# Patient Record
Sex: FEMALE | Race: WHITE | NOT HISPANIC OR LATINO | Employment: FULL TIME | URBAN - METROPOLITAN AREA
[De-identification: names, ages, dates, MRNs, and addresses within clinical notes are randomized per-mention and may not be internally consistent; named-entity substitution may affect disease eponyms.]

---

## 2017-01-06 ENCOUNTER — OFFICE VISIT (OUTPATIENT)
Dept: DERMATOLOGY | Facility: CLINIC | Age: 22
End: 2017-01-06

## 2017-01-06 DIAGNOSIS — L65.9 ALOPECIA: Primary | ICD-10-CM

## 2017-01-06 RX ORDER — IBUPROFEN 200 MG
200 TABLET ORAL PRN
COMMUNITY
End: 2024-02-12

## 2017-01-06 RX ORDER — CLINDAMYCIN AND BENZOYL PEROXIDE 10; 50 MG/G; MG/G
GEL TOPICAL DAILY
COMMUNITY
Start: 2016-03-14 | End: 2017-03-14

## 2017-01-06 RX ORDER — TRETINOIN 1 MG/G
GEL TOPICAL AT BEDTIME
COMMUNITY
Start: 2016-03-14 | End: 2017-03-14

## 2017-01-06 ASSESSMENT — PAIN SCALES - GENERAL: PAINLEVEL: NO PAIN (0)

## 2017-01-06 NOTE — Clinical Note
1/6/2017       RE: Daily Vyas  [address] [facility] [address]     Dear Colleague,    Thank you for referring your patient, Daily Vyas, to the [facility] DERMATOLOGY at [facility]. Please see a copy of my visit note below.    [facility] Dermatology Note      Dermatology Problem List:  1.Suspected androgenetic alopecia, ddx otherwise includes alopecia areata. Will treat empirically with Minoxidil 5% foam twice nightly. Plan for scalp bx in ~4 weeks to confirm diagnosis.     Encounter Date: Jan 6, 2017    CC:   Chief Complaint   Patient presents with     Hair/Scalp Problem     Hair loss x4 months. Daily notes no burning, itch or pain. She does note a dry scalp.         History of Present Illness:  Ms. Daily Vyas is a 21 year old female who presents for evaluation of hair loss. Patient states that she first noticed having hair loss in August. Noticed this as losing more hair while combing, washing her hair. States that her hairs come out as individual strands. She has not been having clumping hair loss. Denies any associated symptoms such as burning, itching, or discomfort of her scalp. She does think that she may have some increased flaking/dandruff of her scalp in the last 2-3 weeks. States she has not noticed any eyelash, eyebrow, or nail changes during this time. Does not notice that her hairs any finer or thinner than usual. No recent significant illness or mental stress. Pt has changed shampoos since this began in attempt to help with the hair loss. Pt also started taking biotin to try to increase her hair growth. No family hx of hair loss or thinning. She is otherwise feeling well and in her general state of health without any other medical problems.     Past Medical History:   There is no problem list on file for this patient.    History reviewed. No pertinent past medical history.  History reviewed. No pertinent past surgical  history.    Social History:  The patient is a student. The patient denies use of tanning beds.    Family History:  There is no family history of skin cancer.    Medications:  Current Outpatient Prescriptions   Medication Sig Dispense Refill     clindamycin-benzoyl peroxide (BENZACLIN) gel Apply topically daily       desogestrel-ethinyl estradiol (JOSS, VELIVET) 0.1/0.125/0.15 -0.025 MG per tablet Take 1 tablet by mouth daily       ibuprofen (ADVIL/MOTRIN) 200 MG tablet Take 200 mg by mouth as needed       tretinoin microspheres (RETIN-A MICRO) 0.1 % topical gel Apply topically At Bedtime          Allergies   Allergen Reactions     Fexofenadine Other (See Comments)     Bloody nose         Review of Systems:  -As per HPI  -Constitutional: The patient denies fatigue, fevers, chills, unintended weight loss, and night sweats.  -HEENT: Patient denies nonhealing oral sores.  -Skin: As above in HPI. No additional skin concerns.    Physical exam:  Vitals: There were no vitals taken for this visit.  GEN: This is a well developed, well-nourished female in no acute distress, in a pleasant mood.    SKIN: Focused examination of the scalp was performed.  - Frontal hair line preserved  - Diffuse thinning of the scalp  - There is a more discrete patch of non-scarring hair loss on R temporal scalp with no perifollicular or scalp erythema, tenderness  - Diffuse patchy, flaking scale in scalp  - No other lesions of concern on areas examined.     Impression/Plan:  1. Suspected androgenetic alopecia: favor androgenetic alopecia given clinical presentation of diffuse thinning of hair, preservation of frontal hair line, lack of clumping hair loss, lack of many totally alopecic areas. It is also possible this is diffuse alopecia areata. Discussed pathophysiology of different diseases with the patient. Discussed treatment options, including minoxidil and spironolactone. Offered patient the option of a scalp biopsy today to help elucidate  the exact etiology of the hair loss. Patient prefers to abstain from scalp biopsy today, but would be interested in this at a follow up appointment. Will plan to treat empiricially with Rogaine today and have patient back in ~4 weeks for biopsy.     Rogaine 5% foam recommended, to apply twice nightly    Consider spironolactone in future if androgenetic alopecia confirmed    Plan to follow up in ~4 weeks for scalp biopsy      Follow-up in 4 weeks, earlier for new or changing lesions.     Dr. Fly Whaley staffed the patient.    Staff Involved:  Resident(Ryan Araya)/Staff(as above)    Staff Physician Comments:   I saw and evaluated the patient with the resident and I agree with the assessment and plan.  I was present for the examination.    Fly Whaley MD  Dermatology Staff Physician  , Department of Dermatology        Again, thank you for allowing me to participate in the care of your patient.      Sincerely,    Fly Whaley MD

## 2017-01-06 NOTE — Clinical Note
Date:January 30, 2017      Patient was self referred, no letter generated. Do not send.        [facility] Health Information

## 2017-01-06 NOTE — PATIENT INSTRUCTIONS
We suspect that your hair loss is related to female-pattern hair loss. This is due to hormones your body produces. The other possible diagnosis includes something called alopecia areata. We would need a scalp biopsy to definitively distinguish between these two diseases, but based on your clinical presentation, we do suspect female-pattern hair loss as most likely cause.  - Rogaine 5% foam, apply twice at night  - Recommend following up in roughly 4 weeks for biopsy

## 2017-01-06 NOTE — PROGRESS NOTES
[facility] Dermatology Note      Dermatology Problem List:  1.Suspected androgenetic alopecia, ddx otherwise includes alopecia areata. Will treat empirically with Minoxidil 5% foam twice nightly. Plan for scalp bx in ~4 weeks to confirm diagnosis.     Encounter Date: Jan 6, 2017    CC:   Chief Complaint   Patient presents with     Hair/Scalp Problem     Hair loss x4 months. Daily notes no burning, itch or pain. She does note a dry scalp.         History of Present Illness:  Ms. Daily Vyas is a 21 year old female who presents for evaluation of hair loss. Patient states that she first noticed having hair loss in August. Noticed this as losing more hair while combing, washing her hair. States that her hairs come out as individual strands. She has not been having clumping hair loss. Denies any associated symptoms such as burning, itching, or discomfort of her scalp. She does think that she may have some increased flaking/dandruff of her scalp in the last 2-3 weeks. States she has not noticed any eyelash, eyebrow, or nail changes during this time. Does not notice that her hairs any finer or thinner than usual. No recent significant illness or mental stress. Pt has changed shampoos since this began in attempt to help with the hair loss. Pt also started taking biotin to try to increase her hair growth. No family hx of hair loss or thinning. She is otherwise feeling well and in her general state of health without any other medical problems.     Past Medical History:   There is no problem list on file for this patient.    History reviewed. No pertinent past medical history.  History reviewed. No pertinent past surgical history.    Social History:  The patient is a student. The patient denies use of tanning beds.    Family History:  There is no family history of skin cancer.    Medications:  Current Outpatient Prescriptions   Medication Sig Dispense Refill     clindamycin-benzoyl peroxide  (BENZACLIN) gel Apply topically daily       desogestrel-ethinyl estradiol (JOSS, VELIVET) 0.1/0.125/0.15 -0.025 MG per tablet Take 1 tablet by mouth daily       ibuprofen (ADVIL/MOTRIN) 200 MG tablet Take 200 mg by mouth as needed       tretinoin microspheres (RETIN-A MICRO) 0.1 % topical gel Apply topically At Bedtime          Allergies   Allergen Reactions     Fexofenadine Other (See Comments)     Bloody nose         Review of Systems:  -As per HPI  -Constitutional: The patient denies fatigue, fevers, chills, unintended weight loss, and night sweats.  -HEENT: Patient denies nonhealing oral sores.  -Skin: As above in HPI. No additional skin concerns.    Physical exam:  Vitals: There were no vitals taken for this visit.  GEN: This is a well developed, well-nourished female in no acute distress, in a pleasant mood.    SKIN: Focused examination of the scalp was performed.  - Frontal hair line preserved  - Diffuse thinning of the scalp  - There is a more discrete patch of non-scarring hair loss on R temporal scalp with no perifollicular or scalp erythema, tenderness  - Diffuse patchy, flaking scale in scalp  - No other lesions of concern on areas examined.     Impression/Plan:  1. Suspected androgenetic alopecia: favor androgenetic alopecia given clinical presentation of diffuse thinning of hair, preservation of frontal hair line, lack of clumping hair loss, lack of many totally alopecic areas. It is also possible this is diffuse alopecia areata. Discussed pathophysiology of different diseases with the patient. Discussed treatment options, including minoxidil and spironolactone. Offered patient the option of a scalp biopsy today to help elucidate the exact etiology of the hair loss. Patient prefers to abstain from scalp biopsy today, but would be interested in this at a follow up appointment. Will plan to treat empiricially with Rogaine today and have patient back in ~4 weeks for biopsy.     Rogaine 5% foam  recommended, to apply twice nightly    Consider spironolactone in future if androgenetic alopecia confirmed    Plan to follow up in ~4 weeks for scalp biopsy      Follow-up in 4 weeks, earlier for new or changing lesions.     Dr. Fly Whaley staffed the patient.    Staff Involved:  Resident(Ryan Araya)/Staff(as above)    Staff Physician Comments:   I saw and evaluated the patient with the resident and I agree with the assessment and plan.  I was present for the examination.    Fly Whaley MD  Dermatology Staff Physician  , Department of Dermatology

## 2017-01-06 NOTE — NURSING NOTE
Dermatology Rooming Note    Daily Vyas's goals for this visit include:   Chief Complaint   Patient presents with     Hair/Scalp Problem     Hair loss x4 months. Daily notes no burning, itch or pain. She does note a dry scalp.     Annette Couch, CMA

## 2017-01-17 ENCOUNTER — OFFICE VISIT (OUTPATIENT)
Dept: DERMATOLOGY | Facility: CLINIC | Age: 22
End: 2017-01-17

## 2017-01-17 DIAGNOSIS — L65.9 ALOPECIA: ICD-10-CM

## 2017-01-17 DIAGNOSIS — L63.9 AA (ALOPECIA AREATA): Primary | ICD-10-CM

## 2017-01-17 RX ORDER — LIDOCAINE HYDROCHLORIDE AND EPINEPHRINE 10; 10 MG/ML; UG/ML
3 INJECTION, SOLUTION INFILTRATION; PERINEURAL ONCE
Qty: 3 ML | Refills: 0 | OUTPATIENT
Start: 2017-01-17 | End: 2017-01-17

## 2017-01-17 ASSESSMENT — PAIN SCALES - GENERAL: PAINLEVEL: NO PAIN (0)

## 2017-01-17 NOTE — PROGRESS NOTES
[facility] Dermatology Note      Dermatology Problem List:  1.Suspected androgenetic alopecia, ddx otherwise includes alopecia areata.    - scalp bx 1/17/17    Encounter Date: Jan 17, 2017    CC:   Chief Complaint   Patient presents with     Derm Problem     Daily comes to clinic stating she is having a scalp biopsy         History of Present Illness:  Ms. Daily Vyas is a 21 year old female who presents as a follow-up for hair loss. The patient was last seen 1/6/17. Pt is presenting today for a scalp biopsy. Pt states she has not had any significant changes since last visit about 10 days ago. Pt has not started rogaine yet. Pt otherwise doing well without any other questions or concerns.      Past Medical History:   There is no problem list on file for this patient.    No past medical history on file.  No past surgical history on file.    Social History:  The patient is a student. The patient denies use of tanning beds.    Family History:  There is no family history of skin cancer.    Medications:  Current Outpatient Prescriptions   Medication Sig Dispense Refill     clindamycin-benzoyl peroxide (BENZACLIN) gel Apply topically daily       desogestrel-ethinyl estradiol (JOSS, VELIVET) 0.1/0.125/0.15 -0.025 MG per tablet Take 1 tablet by mouth daily       ibuprofen (ADVIL/MOTRIN) 200 MG tablet Take 200 mg by mouth as needed       tretinoin microspheres (RETIN-A MICRO) 0.1 % topical gel Apply topically At Bedtime          Allergies   Allergen Reactions     Fexofenadine Other (See Comments)     Bloody nose       Review of Systems:  -As per HPI  -Constitutional: The patient denies fatigue, fevers, chills, unintended weight loss, and night sweats.  -HEENT: Patient denies nonhealing oral sores.  -Skin: As above in HPI. No additional skin concerns.    Physical exam:  Vitals: There were no vitals taken for this visit.  GEN: This is a well developed, well-nourished female in no acute distress,  in a pleasant mood.    SKIN: Focused examination of the scalp was performed.  - Frontal hair line preserved  - Diffuse thinning of the scalp  - There is a more discrete patch of non-scarring hair loss on R temporal scalp with no perifollicular or scalp erythema, tenderness  - Diffuse flaking scale in scalp  -No other lesions of concern on areas examined.     Impression/Plan:  1. Suspected Androgenetic alopecia: favor androgenetic alopecia given clinical presentation of diffuse thinning of hair, preservation of frontal hair line, lack of clumping hair loss, lack of many totally alopecic areas. It is also possible this is diffuse alopecia areata. Differential otherwise can include hair loss secondary to SLE. Scalp biopsy obtained today to help further elucidate exact underlying cause of hair loss.      Scalp bx obtained 1/17/17    Will plan to likely begin Minoxidil 5% pending biopsy results     Also will consider spironolactone in future if androgenetic alopecia confirmed    Follow-up in 2 weeks, earlier for new or changing lesions.     Dr. Whaley staffed the patient.    Staff Involved:  Resident(Ryan Araya)/Staff(as above)    Staff Physician Comments:   I saw and evaluated the patient with the resident and I agree with the assessment and plan.  I was present for the key portions of the above major procedure and examination.    Fly Whaley MD  Dermatology Staff Physician  , Department of Dermatology

## 2017-01-17 NOTE — NURSING NOTE
Dermatology Rooming Note    Daily Vyas's goals for this visit include:   Chief Complaint   Patient presents with     Derm Problem     Daily comes to clinic stating she is having a scalp biopsy     Anu Rivera LPN

## 2017-01-17 NOTE — PATIENT INSTRUCTIONS
Wound Care After a Biopsy    What is a skin biopsy?  A skin biopsy allows the doctor to examine a very small piece of tissue under the microscope to determine the diagnosis and the best treatment for the skin condition. A local anesthetic (numbing medicine)  is injected with a very small needle into the skin area to be tested. A small piece of skin is taken from the area. Sometimes a suture (stitch) is used.     What are the risks of a skin biopsy?  I will experience scar, bleeding, swelling, pain, crusting and redness. I may experience incomplete removal or recurrence. Risks of this procedure are excessive bleeding, bruising, infection, nerve damage, numbness, thick (hypertrophic or keloidal) scar and non-diagnostic biopsy.    How should I care for my wound for the first 24 hours?    Keep the wound dry and covered for 24 hours    If it bleeds, hold direct pressure on the area for 15 minutes. If bleeding does not stop then go to the emergency room    Avoid strenuous exercise the first 1-2 days or as your doctor instructs you    How should I care for the wound after 24 hours?    After 24 hours, remove the bandage    You may bathe or shower as normal    If you had a scalp biopsy, you can shampoo as usual and can use shower water to clean the biopsy site daily    Clean the wound twice a day with gentle soap and water    Do not scrub, be gentle    Apply white petroleum/Vaseline after cleaning the wound with a cotton swab or a clean finger, and keep the site covered with a Bandaid /bandage. Bandages are not necessary with a scalp biopsy    If you are unable to cover the site with a Bandaid /bandage, re-apply ointment 2-3 times a day to keep the site moist. Moisture will help with healing    Avoid strenuous activity for first 1-2 days    Avoid lakes, rivers, pools, and oceans until the stitches are removed or the site is healed    How do I clean my wound?    Wash hands for 15 minutes with soap or use hand  before  all wound care    Clean the wound with gentle soap and water    Apply white petroleum/Vaseline  to wound after it is clean    Replace the Bandaid /bandage to keep the wound covered for the first few days or as instructed by your doctor    If you had a scalp biopsy, warm shower water to the area on a daily basis should suffice    What should I use to clean my wound?     Cotton-tipped applicators (Qtips )    White petroleum jelly (Vaseline ). Use a clean new container and use Q-tips to apply.    Bandaids   as needed    Gentle soap     How should I care for my wound long term?    Do not get your wound dirty    Keep up with wound care for one week or until the area is healed.    A small scab will form and fall off by itself when the area is completely healed. The area will be red and will become pink in color as it heals. Sun protection is very important for how your scar will turn out. Sunscreen with an SPF 30 or greater is recommended once the area is healed.    If you have stitches, stitches need to be removed in 14 days. You may return to our clinic for this or you may have it done locally at your doctor s office.    You should have some soreness but it should be mild and slowly go away over several days. Talk to your doctor about using tylenol for pain,    When should I call my doctor?  If you have increased:     Pain or swelling    Pus or drainage (clear or slightly yellow drainage is ok)    Temperature over 100F    Spreading redness or warmth around wound    When will I hear about my results?  The biopsy results can take 2-3 weeks to come back. The clinic will call you with the results, send you a [employer] message, or have you schedule a follow-up clinic or phone time to discuss the results. Contact our clinics if you do not hear from us in 3 weeks.     Who should I call with questions?    [facility]: [phone]     [facility]: [phone]    For  urgent needs outside of business hours call the [facility] at [phone] and ask for the dermatology resident on call

## 2017-01-17 NOTE — Clinical Note
Date:February 1, 2017      Patient was self referred, no letter generated. Do not send.        [facility] Physicians Health Information

## 2017-01-17 NOTE — MR AVS SNAPSHOT
After Visit Summary   1/17/2017    Daily Vyas    MRN: [id number]           Patient Information     Date Of Birth          1995        Visit Information        Provider Department      1/17/2017 1:00 PM Fly Whaley MD [facility] Dermatology        Today's Diagnoses     AA (alopecia areata)    -  1     Alopecia           Care Instructions    Wound Care After a Biopsy    What is a skin biopsy?  A skin biopsy allows the doctor to examine a very small piece of tissue under the microscope to determine the diagnosis and the best treatment for the skin condition. A local anesthetic (numbing medicine)  is injected with a very small needle into the skin area to be tested. A small piece of skin is taken from the area. Sometimes a suture (stitch) is used.     What are the risks of a skin biopsy?  I will experience scar, bleeding, swelling, pain, crusting and redness. I may experience incomplete removal or recurrence. Risks of this procedure are excessive bleeding, bruising, infection, nerve damage, numbness, thick (hypertrophic or keloidal) scar and non-diagnostic biopsy.    How should I care for my wound for the first 24 hours?    Keep the wound dry and covered for 24 hours    If it bleeds, hold direct pressure on the area for 15 minutes. If bleeding does not stop then go to the emergency room    Avoid strenuous exercise the first 1-2 days or as your doctor instructs you    How should I care for the wound after 24 hours?    After 24 hours, remove the bandage    You may bathe or shower as normal    If you had a scalp biopsy, you can shampoo as usual and can use shower water to clean the biopsy site daily    Clean the wound twice a day with gentle soap and water    Do not scrub, be gentle    Apply white petroleum/Vaseline after cleaning the wound with a cotton swab or a clean finger, and keep the site covered with a Bandaid /bandage. Bandages are not necessary with a scalp biopsy    If you are  unable to cover the site with a Bandaid /bandage, re-apply ointment 2-3 times a day to keep the site moist. Moisture will help with healing    Avoid strenuous activity for first 1-2 days    Avoid lakes, rivers, pools, and oceans until the stitches are removed or the site is healed    How do I clean my wound?    Wash hands for 15 minutes with soap or use hand  before all wound care    Clean the wound with gentle soap and water    Apply white petroleum/Vaseline  to wound after it is clean    Replace the Bandaid /bandage to keep the wound covered for the first few days or as instructed by your doctor    If you had a scalp biopsy, warm shower water to the area on a daily basis should suffice    What should I use to clean my wound?     Cotton-tipped applicators (Qtips )    White petroleum jelly (Vaseline ). Use a clean new container and use Q-tips to apply.    Bandaids   as needed    Gentle soap     How should I care for my wound long term?    Do not get your wound dirty    Keep up with wound care for one week or until the area is healed.    A small scab will form and fall off by itself when the area is completely healed. The area will be red and will become pink in color as it heals. Sun protection is very important for how your scar will turn out. Sunscreen with an SPF 30 or greater is recommended once the area is healed.    If you have stitches, stitches need to be removed in 14 days. You may return to our clinic for this or you may have it done locally at your doctor s office.    You should have some soreness but it should be mild and slowly go away over several days. Talk to your doctor about using tylenol for pain,    When should I call my doctor?  If you have increased:     Pain or swelling    Pus or drainage (clear or slightly yellow drainage is ok)    Temperature over 100F    Spreading redness or warmth around wound    When will I hear about my results?  The biopsy results can take 2-3 weeks to come  back. The clinic will call you with the results, send you a [employer] message, or have you schedule a follow-up clinic or phone time to discuss the results. Contact our clinics if you do not hear from us in 3 weeks.     Who should I call with questions?    [facility]: [phone]     [facility]: [phone]    For urgent needs outside of business hours call the [facility] at [phone] and ask for the dermatology resident on call            Follow-ups after your visit        Follow-up notes from your care team     Return in about 2 weeks (around 1/31/2017).      Your next 10 appointments already scheduled     Jan 31, 2017  1:30 PM   (Arrive by 1:15 PM)   Return Visit with Fly Whaley MD   [facility] Dermatology ([facility] [place])    [address] [place] [id number] [phone]              Who to contact     Please call your clinic at [phone] to:    Ask questions about your health    Make or cancel appointments    Discuss your medicines    Learn about your test results    Speak to your doctor   If you have compliments or concerns about an experience at your clinic, or if you wish to file a complaint, please contact [facility] Patient Relations at [phone] or email us at [email or web address]         Additional Information About Your Visit        [employer] Information     [employer] gives you secure access to your electronic health record. If you see a primary care provider, you can also send messages to your care team and make appointments. If you have questions, please call your primary care clinic.  If you do not have a primary care provider, please call [phone] and they will assist you.      [employer] is an electronic gateway that provides easy, online access to your medical records. With [employer], you can request a clinic appointment, read your  test results, renew a prescription or communicate with your care team.     To access your existing account, please contact your [facility] or call [phone] for assistance.        Care EveryWhere ID     This is your Care EveryWhere ID. This could be used by other organizations to access your [facility] medical records  [id number]         Blood Pressure from Last 3 Encounters:   No data found for BP    Weight from Last 3 Encounters:   No data found for Wt              We Performed the Following     BIOPSY SKIN/SUBQ/MUC MEM, SINGLE LESION     Surgical pathology exam          Today's Medication Changes          These changes are accurate as of: 1/17/17  1:56 PM.  If you have any questions, ask your nurse or doctor.               Start taking these medicines.        Dose/Directions    lidocaine 1% with EPINEPHrine 1:100,000 1 %-1:371665 injection   Used for:  Alopecia        Dose:  3 mL   Inject 3 mLs into the skin once for 1 dose   Quantity:  3 mL   Refills:  0            Where to get your medicines      Some of these will need a paper prescription and others can be bought over the counter.  Ask your nurse if you have questions.     You don't need a prescription for these medications    - lidocaine 1% with EPINEPHrine 1:100,000 1 %-1:972942 injection             Primary Care Provider    [facility] Other Clinic                Thank you!     Thank you for choosing [facility] DERMATOLOGY  for your care. Our goal is always to provide you with excellent care. Hearing back from our patients is one way we can continue to improve our services. Please take a few minutes to complete the written survey that you may receive in the mail after your visit with us. Thank you!             Your Updated Medication List - Protect others around you: Learn how to safely use, store and throw away your medicines at www.disposemymeds.org.          This list is accurate as of: 1/17/17  1:56 PM.  Always use your most  recent med list.                   Brand Name Dispense Instructions for use    clindamycin-benzoyl peroxide gel    BENZACLIN     Apply topically daily       desogestrel-ethinyl estradiol 0.1/0.125/0.15 -0.025 MG per tablet    JOSS, VELIVET     Take 1 tablet by mouth daily       ibuprofen 200 MG tablet    ADVIL/MOTRIN     Take 200 mg by mouth as needed       lidocaine 1% with EPINEPHrine 1:100,000 1 %-1:263178 injection     3 mL    Inject 3 mLs into the skin once for 1 dose       tretinoin microspheres 0.1 % topical gel    RETIN-A MICRO     Apply topically At Bedtime

## 2017-01-17 NOTE — Clinical Note
1/17/2017       RE: Daily Vyas  [address] [facility] [address]     Dear Colleague,    Thank you for referring your patient, Daily Vyas, to the [facility] DERMATOLOGY at [facility]. Please see a copy of my visit note below.    [facility] Dermatology Note      Dermatology Problem List:  1.Suspected androgenetic alopecia, ddx otherwise includes alopecia areata.    - scalp bx 1/17/17    Encounter Date: Jan 17, 2017    CC:   Chief Complaint   Patient presents with     Derm Problem     Daily comes to clinic stating she is having a scalp biopsy         History of Present Illness:  Ms. Daily Vyas is a 21 year old female who presents as a follow-up for hair loss. The patient was last seen 1/6/17. Pt is presenting today for a scalp biopsy. Pt states she has not had any significant changes since last visit about 10 days ago. Pt has not started rogaine yet. Pt otherwise doing well without any other questions or concerns.      Past Medical History:   There is no problem list on file for this patient.    No past medical history on file.  No past surgical history on file.    Social History:  The patient is a student. The patient denies use of tanning beds.    Family History:  There is no family history of skin cancer.    Medications:  Current Outpatient Prescriptions   Medication Sig Dispense Refill     clindamycin-benzoyl peroxide (BENZACLIN) gel Apply topically daily       desogestrel-ethinyl estradiol (JOSS, VELIVET) 0.1/0.125/0.15 -0.025 MG per tablet Take 1 tablet by mouth daily       ibuprofen (ADVIL/MOTRIN) 200 MG tablet Take 200 mg by mouth as needed       tretinoin microspheres (RETIN-A MICRO) 0.1 % topical gel Apply topically At Bedtime          Allergies   Allergen Reactions     Fexofenadine Other (See Comments)     Bloody nose       Review of Systems:  -As per HPI  -Constitutional: The patient denies fatigue, fevers, chills,  unintended weight loss, and night sweats.  -HEENT: Patient denies nonhealing oral sores.  -Skin: As above in HPI. No additional skin concerns.    Physical exam:  Vitals: There were no vitals taken for this visit.  GEN: This is a well developed, well-nourished female in no acute distress, in a pleasant mood.    SKIN: Focused examination of the scalp was performed.  - Frontal hair line preserved  - Diffuse thinning of the scalp  - There is a more discrete patch of non-scarring hair loss on R temporal scalp with no perifollicular or scalp erythema, tenderness  - Diffuse flaking scale in scalp  -No other lesions of concern on areas examined.     Impression/Plan:  1. Suspected Androgenetic alopecia: favor androgenetic alopecia given clinical presentation of diffuse thinning of hair, preservation of frontal hair line, lack of clumping hair loss, lack of many totally alopecic areas. It is also possible this is diffuse alopecia areata. Differential otherwise can include hair loss secondary to SLE. Scalp biopsy obtained today to help further elucidate exact underlying cause of hair loss.      Scalp bx obtained 1/17/17    Will plan to likely begin Minoxidil 5% pending biopsy results     Also will consider spironolactone in future if androgenetic alopecia confirmed    Follow-up in 2 weeks, earlier for new or changing lesions.     Dr. Whaley staffed the patient.    Staff Involved:  Resident(Ryan Araya)/Staff(as above)    Staff Physician Comments:   I saw and evaluated the patient with the resident and I agree with the assessment and plan.  I was present for the key portions of the above major procedure and examination.    Fly Whaley MD  Dermatology Staff Physician  , Department of Dermatology        Again, thank you for allowing me to participate in the care of your patient.      Sincerely,    Fly Whaley MD

## 2017-01-18 ASSESSMENT — PAIN SCALES - GENERAL: PAINLEVEL: NO PAIN (0)

## 2017-01-23 LAB — COPATH REPORT: NORMAL

## 2017-01-28 PROBLEM — L65.9 ALOPECIA: Status: ACTIVE | Noted: 2017-01-28

## 2017-01-31 ENCOUNTER — OFFICE VISIT (OUTPATIENT)
Dept: DERMATOLOGY | Facility: CLINIC | Age: 22
End: 2017-01-31

## 2017-01-31 DIAGNOSIS — L63.9 AA (ALOPECIA AREATA): Primary | ICD-10-CM

## 2017-01-31 DIAGNOSIS — L21.9 DERMATITIS, SEBORRHEIC: ICD-10-CM

## 2017-01-31 RX ORDER — CLOBETASOL PROPIONATE 0.5 MG/ML
SOLUTION TOPICAL
Qty: 50 ML | Refills: 3 | Status: SHIPPED | OUTPATIENT
Start: 2017-01-31 | End: 2024-02-12

## 2017-01-31 RX ORDER — KETOCONAZOLE 20 MG/ML
SHAMPOO TOPICAL
Qty: 120 ML | Refills: 11 | Status: SHIPPED | OUTPATIENT
Start: 2017-01-31 | End: 2018-03-06

## 2017-01-31 ASSESSMENT — PAIN SCALES - GENERAL: PAINLEVEL: NO PAIN (0)

## 2017-01-31 NOTE — NURSING NOTE
"Dermatology Rooming Note    Daily Vyas's goals for this visit include:   Chief Complaint   Patient presents with     Derm Problem     follow up, \"things are going well.\"     Bindu Hurtado, [facility]    "

## 2017-01-31 NOTE — LETTER
Date:February 13, 2017      Patient was self referred, no letter generated. Do not send.        [facility] Physicians Health Information

## 2017-01-31 NOTE — Clinical Note
"1/31/2017       RE: Daily Vyas  [address] [facility] [address]     Dear Colleague,    Thank you for referring your patient, Daily Vyas, to the [facility] DERMATOLOGY at [facility]. Please see a copy of my visit note below.    [facility] Dermatology Note      Dermatology Problem List:  1.Suspected diffuse alopecia areata.    - scalp bx 1/17/17    Encounter Date: Jan 31, 2017    CC:   Chief Complaint   Patient presents with     Derm Problem     follow up, \"things are going well.\"         History of Present Illness:  Ms. Daily Vyas is a 21 year old female who presents as a follow-up for hair loss. The patient was last seen 1/17/17 for a scalp biopsy and suture removal. Results have returned but she has not heard about them yet.    She notes she has not been paying particular attention to her hair loss in the last two weeks. She has not changed any behaviors in hair care except washing less frequently.    As far as her skin history, her mother notes she had childhood eczema and asthma. Daily reports once episode of a rash with red scaly spots last year after an URI. She treated the rash with steroids and it resolved.      Past Medical History:   Patient Active Problem List   Diagnosis     Alopecia   Notes childhood history of eczema and asthma  Note ear flaking for the past ~2 years  Nickel allergy- identified by allergy to belt buckle  \"Sun allergy\"- mom notes hives when out in sun, pt reports this has improve over time    History reviewed. No pertinent past medical history.  History reviewed. No pertinent past surgical history.    Social History:  The patient is a student. The patient denies use of tanning beds.    Family History:  Eczema in maternal grandfather, mother  Father with eczema and cracking    Medications:  Current Outpatient Prescriptions   Medication Sig Dispense Refill     clindamycin-benzoyl peroxide (BENZACLIN) gel " Apply topically daily       desogestrel-ethinyl estradiol (JOSS, VELIVET) 0.1/0.125/0.15 -0.025 MG per tablet Take 1 tablet by mouth daily       ibuprofen (ADVIL/MOTRIN) 200 MG tablet Take 200 mg by mouth as needed       tretinoin microspheres (RETIN-A MICRO) 0.1 % topical gel Apply topically At Bedtime          Allergies   Allergen Reactions     Fexofenadine Other (See Comments)     Bloody nose       Review of Systems:  -As per HPI  -Constitutional: The patient denies fatigue, fevers, chills, unintended weight loss, and night sweats.  -HEENT: Patient denies nonhealing oral sores.  -Skin: As above in HPI. No additional skin concerns.    Physical exam:  Vitals: There were no vitals taken for this visit.  GEN: This is a well developed, well-nourished female in no acute distress, in a pleasant mood.    SKIN: Focused examination of the scalp was performed.  - Frontal hair line preserved  - Diffuse decreased density of hair of scalp  - There is a more discrete patch of non-scarring hair loss on R temporal scalp with no perifollicular or scalp erythema, tenderness  - Diffuse flaking scale in scalp  - All ten fingernails WNL  - slight erythema of gluteal cleft with minimal scale at superior aspect  - No other lesions of concern on areas examined.     Labs:  A.  Skin, scalp, right parietal, horizontal:   - Nonscarring alopecia with miniaturization and a pronounced nonanagen   shift - (see comment)     B.  Skin, scalp, right parietal, vertical:   - Nonscarring alopecia - (see comment)     COMMENT:   Both biopsies demonstrate similar features.  Given the near-absence of   sebaceous glands in part A, and notably miniaturized sebaceous units in   part B, psoriatic alopecia is suspected, though the differential   diagnosis also includes alopecia areata.  Clinical correlation is   recommended.     Impression/Plan:  1. Alopecia: ddx psoriatic alopecia v. diffuse alopecia areata based on biopsy results. Discussed both of these  conditions in depth, including explanation of autoimmune nature of alopecia areata and attack of the pigmented hair. Discussed that natural history is difficult to predict based on the infrequency with which we see this pattern. Because psoriatic destructive alopecia is only seen in fulminant cases of scalp psoriasis, and this does not match her clinical scenario - I think that the diagnosis is best interpreted as diffuse alopecia areata.    Start ketoconazole 2% shampoo 3 times weekly (discussed Head and Shoulders and Selsun Blue to rotate in to make for shampooing once daily, can condition for the ends of the hair)    Discussed treatment options including intralesional triamcinolone injections v. Topical steroids    After discussion with patient and mother, elected to prescribe topical clobetasol 0.05% solution, and apply to frontal, crown and sides of scalp (ears and above)    As far as Rogaine (minoxidil): this does not treat the inflammation at the base of the hair follicles, but it may help hair grow back faster IF it is able to grow (basically if the inflammation is under control).     TSH: pt thinks may have been checked recently, records to be obtained - release of info signed    Follow-up in 4 weeks, earlier for new or changing lesions.     Dr. Whaley staffed the patient.    Staff Involved:  Resident(Charu Ibrahim)/Staff(as above)    Charu Ibrahim MD  PGY-3 Dermatology  Pager: [phone]    Staff Physician Comments:   I saw and evaluated the patient with the resident and I agree with the assessment and plan.  I was present for the examination.    Fly Whaley MD  Dermatology Staff Physician  , Department of Dermatology        Again, thank you for allowing me to participate in the care of your patient.      Sincerely,    Fly Whaley MD

## 2017-01-31 NOTE — MR AVS SNAPSHOT
After Visit Summary   1/31/2017    Daily Vyas    MRN: [id number]           Patient Information     Date Of Birth          1995        Visit Information        Provider Department      1/31/2017 1:15 PM Fly Whaley MD [facility] Dermatology        Today's Diagnoses     AA (alopecia areata)    -  1     Dermatitis, seborrheic           Care Instructions    Today's discussion:    Diagnosis: diffuse alopecia areata (working diagnosis! Remember that the biopsy was not 100% clear and we will follow you closely)      Start ketoconazole 2% shampoo 3 times weekly (discussed Head and Shoulders and Selsun Blue to rotate in to make for shampooing once daily, can condition for the ends of the hair)       Discussed treatment options including intralesional triamcinolone injections v. Topical steroids      Topical clobetasol 0.05% solution, and apply to frontal, crown and sides of scalp (ears and above), ONCE daily at time convenient to you      As far as Rogaine (minoxidil): this does not treat the inflammation at the base of the hair follicles, but it may help hair grow back faster IF it is able to grow (basically if the inflammation is under control).           Follow-ups after your visit        Follow-up notes from your care team     Return in about 4 weeks (around 2/28/2017).      Who to contact     Please call your clinic at [phone] to:    Ask questions about your health    Make or cancel appointments    Discuss your medicines    Learn about your test results    Speak to your doctor   If you have compliments or concerns about an experience at your clinic, or if you wish to file a complaint, please contact [facility] Physicians Patient Relations at [phone] or email us at [email or web address]         Additional Information About Your Visit        MyChart Information     [employer] gives you secure access to your electronic health record. If you see a primary care  provider, you can also send messages to your care team and make appointments. If you have questions, please call your primary care clinic.  If you do not have a primary care provider, please call [phone] and they will assist you.      [employer] is an electronic gateway that provides easy, online access to your medical records. With [employer], you can request a clinic appointment, read your test results, renew a prescription or communicate with your care team.     To access your existing account, please contact your [facility] or call [phone] for assistance.        Care EveryWhere ID     This is your Care EveryWhere ID. This could be used by other organizations to access your [facility] medical records  [id number]         Blood Pressure from Last 3 Encounters:   No data found for BP    Weight from Last 3 Encounters:   No data found for Wt              Today, you had the following     No orders found for display       Primary Care Provider    [facility] Other Clinic                Thank you!     Thank you for choosing [facility] DERMATOLOGY  for your care. Our goal is always to provide you with excellent care. Hearing back from our patients is one way we can continue to improve our services. Please take a few minutes to complete the written survey that you may receive in the mail after your visit with us. Thank you!             Your Updated Medication List - Protect others around you: Learn how to safely use, store and throw away your medicines at www.disposemymeds.org.          This list is accurate as of: 1/31/17  2:17 PM.  Always use your most recent med list.                   Brand Name Dispense Instructions for use    clindamycin-benzoyl peroxide gel    BENZACLIN     Apply topically daily       desogestrel-ethinyl estradiol 0.1/0.125/0.15 -0.025 MG per tablet    JOSS, VELIVET     Take 1 tablet by mouth daily       ibuprofen 200 MG tablet    ADVIL/MOTRIN     Take 200 mg by mouth as  needed       tretinoin microspheres 0.1 % topical gel    RETIN-A MICRO     Apply topically At Bedtime

## 2017-01-31 NOTE — PATIENT INSTRUCTIONS
Today's discussion:    Diagnosis: diffuse alopecia areata (working diagnosis! Remember that the biopsy was not 100% clear and we will follow you closely)      Start ketoconazole 2% shampoo 3 times weekly (discussed Head and Shoulders and Selsun Blue to rotate in to make for shampooing once daily, can condition for the ends of the hair)       Discussed treatment options including intralesional triamcinolone injections v. Topical steroids      Topical clobetasol 0.05% solution, and apply to frontal, crown and sides of scalp (ears and above), ONCE daily at time convenient to you      As far as Rogaine (minoxidil): this does not treat the inflammation at the base of the hair follicles, but it may help hair grow back faster IF it is able to grow (basically if the inflammation is under control).

## 2017-01-31 NOTE — PROGRESS NOTES
"[facility] Dermatology Note      Dermatology Problem List:  1.Suspected diffuse alopecia areata.    - scalp bx 1/17/17    Encounter Date: Jan 31, 2017    CC:   Chief Complaint   Patient presents with     Derm Problem     follow up, \"things are going well.\"         History of Present Illness:  Ms. Daily Vyas is a 21 year old female who presents as a follow-up for hair loss. The patient was last seen 1/17/17 for a scalp biopsy and suture removal. Results have returned but she has not heard about them yet.    She notes she has not been paying particular attention to her hair loss in the last two weeks. She has not changed any behaviors in hair care except washing less frequently.    As far as her skin history, her mother notes she had childhood eczema and asthma. Daily reports once episode of a rash with red scaly spots last year after an URI. She treated the rash with steroids and it resolved.      Past Medical History:   Patient Active Problem List   Diagnosis     Alopecia   Notes childhood history of eczema and asthma  Note ear flaking for the past ~2 years  Nickel allergy- identified by allergy to belt buckle  \"Sun allergy\"- mom notes hives when out in sun, pt reports this has improve over time    History reviewed. No pertinent past medical history.  History reviewed. No pertinent past surgical history.    Social History:  The patient is a student. The patient denies use of tanning beds.    Family History:  Eczema in maternal grandfather, mother  Father with eczema and cracking    Medications:  Current Outpatient Prescriptions   Medication Sig Dispense Refill     clindamycin-benzoyl peroxide (BENZACLIN) gel Apply topically daily       desogestrel-ethinyl estradiol (JOSS, VELIVET) 0.1/0.125/0.15 -0.025 MG per tablet Take 1 tablet by mouth daily       ibuprofen (ADVIL/MOTRIN) 200 MG tablet Take 200 mg by mouth as needed       tretinoin microspheres (RETIN-A MICRO) 0.1 % topical gel " Apply topically At Bedtime          Allergies   Allergen Reactions     Fexofenadine Other (See Comments)     Bloody nose       Review of Systems:  -As per HPI  -Constitutional: The patient denies fatigue, fevers, chills, unintended weight loss, and night sweats.  -HEENT: Patient denies nonhealing oral sores.  -Skin: As above in HPI. No additional skin concerns.    Physical exam:  Vitals: There were no vitals taken for this visit.  GEN: This is a well developed, well-nourished female in no acute distress, in a pleasant mood.    SKIN: Focused examination of the scalp was performed.  - Frontal hair line preserved  - Diffuse decreased density of hair of scalp  - There is a more discrete patch of non-scarring hair loss on R temporal scalp with no perifollicular or scalp erythema, tenderness  - Diffuse flaking scale in scalp  - All ten fingernails WNL  - slight erythema of gluteal cleft with minimal scale at superior aspect  - No other lesions of concern on areas examined.     Labs:  A.  Skin, scalp, right parietal, horizontal:   - Nonscarring alopecia with miniaturization and a pronounced nonanagen   shift - (see comment)     B.  Skin, scalp, right parietal, vertical:   - Nonscarring alopecia - (see comment)     COMMENT:   Both biopsies demonstrate similar features.  Given the near-absence of   sebaceous glands in part A, and notably miniaturized sebaceous units in   part B, psoriatic alopecia is suspected, though the differential   diagnosis also includes alopecia areata.  Clinical correlation is   recommended.     Impression/Plan:  1. Alopecia: ddx psoriatic alopecia v. diffuse alopecia areata based on biopsy results. Discussed both of these conditions in depth, including explanation of autoimmune nature of alopecia areata and attack of the pigmented hair. Discussed that natural history is difficult to predict based on the infrequency with which we see this pattern. Because psoriatic destructive alopecia is only seen  in fulminant cases of scalp psoriasis, and this does not match her clinical scenario - I think that the diagnosis is best interpreted as diffuse alopecia areata.    Start ketoconazole 2% shampoo 3 times weekly (discussed Head and Shoulders and Selsun Blue to rotate in to make for shampooing once daily, can condition for the ends of the hair)    Discussed treatment options including intralesional triamcinolone injections v. Topical steroids    After discussion with patient and mother, elected to prescribe topical clobetasol 0.05% solution, and apply to frontal, crown and sides of scalp (ears and above)    As far as Rogaine (minoxidil): this does not treat the inflammation at the base of the hair follicles, but it may help hair grow back faster IF it is able to grow (basically if the inflammation is under control).     TSH: pt thinks may have been checked recently, records to be obtained - release of info signed    Follow-up in 4 weeks, earlier for new or changing lesions.     Dr. Whaley staffed the patient.    Staff Involved:  Resident(Charu Ibrahim)/Staff(as above)    Charu Ibrahim MD  PGY-3 Dermatology  Pager: [phone]    Staff Physician Comments:   I saw and evaluated the patient with the resident and I agree with the assessment and plan.  I was present for the examination.    Fly Whaley MD  Dermatology Staff Physician  , Department of Dermatology

## 2017-02-09 ENCOUNTER — TELEPHONE (OUTPATIENT)
Dept: DERMATOLOGY | Facility: CLINIC | Age: 22
End: 2017-02-09

## 2017-02-09 NOTE — TELEPHONE ENCOUNTER
----- Message from Abigail Cormier sent at 2/9/2017 10:27 AM CST -----  Regarding: symptomatic pt - reaction to rx  Contact: [phone]  Pt Ph # [phone],    Pt of Dr Whaley,    Pt received new prescriptions last week and Pt has broken out in a rash since then, Wondering if she should come back to Dermatology to get it looked at or go to PCP or Urgent care, and if should stop taking medicine, etc;    Pt wants call back today to discuss what she should do,    Thanks,  Abigail in Call Center    Please DO NOT send this message and/or reply back to sender.  Call Center Representatives DO NOT respond to messages.

## 2017-02-09 NOTE — TELEPHONE ENCOUNTER
Returned patient's call regarding slightly itchy rash on b/l abdomen. It is exacerbated by heat, and clothing rubbing on the area.  She was seen by Dr. Whaley 1/31/17 for alopecia, ddx psoriatic alopecia vs alopecia areata. She started ketoconazole shampoo and clobetasol solution to the scalp 1 week ago, and started having the abdominal rash 2 days ago. Her neck and upper back are clear. She denies rashes elsewhere, and feels otherwise well.   I discussed that an allergic contact dermatitis to a shampoo would mostly likely show rash on the neck and shoulders, which she is not having. I advised she take a picture of the rash to show Dr. Whaley at her follow up in a few weeks, and start applying the clobetasol solution to the rash twice daily. She should call in 1 week if not doing better. She is happy with the plan.  Florentin Hollis MD  PGY-4 Dermatology Resident  [phone]

## 2017-03-01 ENCOUNTER — OFFICE VISIT (OUTPATIENT)
Dept: DERMATOLOGY | Facility: CLINIC | Age: 22
End: 2017-03-01

## 2017-03-01 DIAGNOSIS — L63.9 AA (ALOPECIA AREATA): Primary | ICD-10-CM

## 2017-03-01 ASSESSMENT — PAIN SCALES - GENERAL: PAINLEVEL: NO PAIN (0)

## 2017-03-01 NOTE — LETTER
3/1/2017       RE: Daily Vyas  [address]  [facility] [address]     Dear Colleague,    Thank you for referring your patient, Daily Vyas, to the [facility] DERMATOLOGY at [facility]. Please see a copy of my visit note below.    [facility] Dermatology Note    Dermatology Problem List:  1. Suspected diffuse alopecia areata.    - scalp bx 1/17/17 favoring psoriatic alopecia but does not match clinically     Encounter Date: Mar 1, 2017    CC:   Chief Complaint   Patient presents with     Hair/Scalp Problem     Alopecia Areata. Daily notes no change since her last visit.     History of Present Illness:  Ms. Daily Vyas is a 21 year old female who presents as a follow-up for hair loss. She had a scalp biopsy on 1/17/17 showing nonscarring alopecia with near-absent and miniaturized sebaceous units most suggestive of psoriatic alopecia. Her insurance has not covered her last two visits or the biopsy because it was deemed cosmetic. She has been using the clobetasol 0.05% shampoo with no significant improvement. No skin irritation or pruritus. She has been having some headaches.     Past Medical History:   Patient Active Problem List   Diagnosis     Alopecia       Past Medical History   Diagnosis Date     Eczema      Nickel allergy      identified by allergy to belt buckle     Uncomplicated asthma      No past surgical history on file.    Social History:  The patient is a student. The patient denies use of tanning beds.    Family History:  Eczema in maternal grandfather, mother  Father with eczema and cracking    Medications:  Current Outpatient Prescriptions   Medication Sig Dispense Refill     ketoconazole (NIZORAL) 2 % shampoo Apply and lather in to damp scalp, leave on skin 3-5 minutes and then rinse 3 times weekly. 120 mL 11     clobetasol (TEMOVATE) 0.05 % external solution Apply to affected areas of scalp (above ears) once daily. 50 mL 3      clindamycin-benzoyl peroxide (BENZACLIN) gel Apply topically daily       desogestrel-ethinyl estradiol (JOSS, VELIVET) 0.1/0.125/0.15 -0.025 MG per tablet Take 1 tablet by mouth daily       ibuprofen (ADVIL/MOTRIN) 200 MG tablet Take 200 mg by mouth as needed       tretinoin microspheres (RETIN-A MICRO) 0.1 % topical gel Apply topically At Bedtime          Allergies   Allergen Reactions     Fexofenadine Other (See Comments)     Bloody nose       Review of Systems:  -As per HPI  -Constitutional: The patient denies fatigue, fevers, chills, unintended weight loss, and night sweats.  -HEENT: Patient denies nonhealing oral sores.  -Skin: As above in HPI. No additional skin concerns.    Physical exam:  Vitals: There were no vitals taken for this visit.  GEN: This is a well developed, well-nourished female in no acute distress, in a pleasant mood.    SKIN: Focused examination of the scalp was performed.  - Frontal hair line preserved  - Negative hair pull test in 4 quadrants  - Scalp healthy without erythema, scale  - Clinically continues to be a non scarring process without loss of follicular orifices  - No other lesions of concern on areas examined.     Labs:  A.  Skin, scalp, right parietal, horizontal: Nonscarring alopecia with miniaturization and a pronounced nonanagen shift - (see comment)   B.  Skin, scalp, right parietal, vertical: Nonscarring alopecia - (see comment)   COMMENT:   Both biopsies demonstrate similar features.  Given the near-absence of sebaceous glands in part A, and notably miniaturized sebaceous units in part B, psoriatic alopecia is suspected, though the differential diagnosis also includes alopecia areata.  Clinical correlation is recommended.     Impression/Plan:  1. Alopecia: ddx psoriatic alopecia v. diffuse alopecia areata based on biopsy results. Discussed both of these conditions in depth, including explanation of autoimmune nature of alopecia areata and attack of the pigmented hair.  "Discussed that natural history is difficult to predict based on the infrequency with which we see this pattern. Because psoriatic destructive alopecia is only seen in fulminant cases of scalp psoriasis, and this does not match her clinical scenario - I think that the diagnosis is best interpreted as diffuse alopecia areata. A meta-analysis from the [place] Journal of Dermatology, 2016, 175, ri847-906, showed that diffuse alopecia areata or psoriatic alopecia has a significant impact on quality of life and should not be considered a \"cosmetic\" condition nor should the care and procedures be considered cosmetic either. I have written a letter and given a copy to the patient to submit to insurance and hopefully this will instill them to cover her care.    Continue ketoconazole 2% shampoo 3 times weekly (discussed Head and Shoulders and Selsun Blue to rotate in to make for shampooing once daily, can condition for the ends of the hair)    Can do intralesional triamcinolone injections vs topical steroids in the future pending insurance coverage.     Continue topical clobetasol 0.05% solution and apply to frontal, crown and sides of scalp (ears and above)    As far as Rogaine (minoxidil): this does not treat the inflammation at the base of the hair follicles, but it may help hair grow back faster IF it is able to grow (basically if the inflammation is under control).     Last TSH was 3.64 on 11/25/16    Follow-up in 2-4 weeks pending verdict in insurance coverage, earlier for new or changing lesions.     Staff Involved:  Staff    Scribe Disclosure:   I, Gopal Viveros, am serving as a scribe to document services personally performed by Dr. Fly Whaley, based on data collection and the provider's statements to me.     Staff Physician Comments:   I saw and evaluated the patient with the resident and I agree with the assessment and plan.  I was present for the examination.    Fly Whaley MD  Dermatology Staff " Physician  , Department of Dermatology      Again, thank you for allowing me to participate in the care of your patient.      Sincerely,    Fly Whaley MD

## 2017-03-01 NOTE — MR AVS SNAPSHOT
After Visit Summary   3/1/2017    Daily Vyas    MRN: [id number]           Patient Information     Date Of Birth          1995        Visit Information        Provider Department      3/1/2017 1:45 PM Fly Whaley MD [facility] Dermatology        Today's Diagnoses     AA (alopecia areata)    -  1       Follow-ups after your visit        Follow-up notes from your care team     Return in about 4 weeks (around 3/29/2017).      Who to contact     Please call your clinic at [phone] to:    Ask questions about your health    Make or cancel appointments    Discuss your medicines    Learn about your test results    Speak to your doctor   If you have compliments or concerns about an experience at your clinic, or if you wish to file a complaint, please contact [facility] Patient Relations at [phone] or email us at [email or web address]         Additional Information About Your Visit        MyChart Information     [employer] gives you secure access to your electronic health record. If you see a primary care provider, you can also send messages to your care team and make appointments. If you have questions, please call your primary care clinic.  If you do not have a primary care provider, please call [phone] and they will assist you.      [employer] is an electronic gateway that provides easy, online access to your medical records. With [employer], you can request a clinic appointment, read your test results, renew a prescription or communicate with your care team.     To access your existing account, please contact your [facility] or call [phone] for assistance.        Care EveryWhere ID     This is your Care EveryWhere ID. This could be used by other organizations to access your [facility] medical records  [id number]         Blood Pressure from Last 3 Encounters:   No data found for BP    Weight from Last 3 Encounters:    No data found for Wt              Today, you had the following     No orders found for display       Primary Care Provider    Md Other Clinic                Thank you!     Thank you for choosing [facility] DERMATOLOG[facility]  for your care. Our goal is always to provide you with excellent care. Hearing back from our patients is one way we can continue to improve our services. Please take a few minutes to complete the written survey that you may receive in the mail after your visit with us. Thank you!             Your Updated Medication List - Protect others around you: Learn how to safely use, store and throw away your medicines at www.disposemymeds.org.          This list is accurate as of: 3/1/17 11:59 PM.  Always use your most recent med list.                   Brand Name Dispense Instructions for use    clindamycin-benzoyl peroxide gel    BENZACLIN     Apply topically daily       clobetasol 0.05 % external solution    TEMOVATE    50 mL    Apply to affected areas of scalp (above ears) once daily.       desogestrel-ethinyl estradiol 0.1/0.125/0.15 -0.025 MG per tablet    JOSS, VELIVET     Take 1 tablet by mouth daily       ibuprofen 200 MG tablet    ADVIL/MOTRIN     Take 200 mg by mouth as needed       ketoconazole 2 % shampoo    NIZORAL    120 mL    Apply and lather in to damp scalp, leave on skin 3-5 minutes and then rinse 3 times weekly.       tretinoin microspheres 0.1 % topical gel    RETIN-A MICRO     Apply topically At Bedtime

## 2017-03-01 NOTE — NURSING NOTE
Dermatology Rooming Note    Daily Vyas's goals for this visit include:   Chief Complaint   Patient presents with     Hair/Scalp Problem     Alopecia Areata. Daily notes no change since her last visit.     Annette Couch, CMA

## 2017-03-01 NOTE — PROGRESS NOTES
[facility] Dermatology Note    Dermatology Problem List:  1. Suspected diffuse alopecia areata.    - scalp bx 1/17/17 favoring psoriatic alopecia but does not match clinically     Encounter Date: Mar 1, 2017    CC:   Chief Complaint   Patient presents with     Hair/Scalp Problem     Alopecia Areata. Daily notes no change since her last visit.     History of Present Illness:  Ms. Daily Vyas is a 21 year old female who presents as a follow-up for hair loss. She had a scalp biopsy on 1/17/17 showing nonscarring alopecia with near-absent and miniaturized sebaceous units most suggestive of psoriatic alopecia. Her insurance has not covered her last two visits or the biopsy because it was deemed cosmetic. She has been using the clobetasol 0.05% shampoo with no significant improvement. No skin irritation or pruritus. She has been having some headaches.     Past Medical History:   Patient Active Problem List   Diagnosis     Alopecia       Past Medical History   Diagnosis Date     Eczema      Nickel allergy      identified by allergy to belt buckle     Uncomplicated asthma      No past surgical history on file.    Social History:  The patient is a student. The patient denies use of tanning beds.    Family History:  Eczema in maternal grandfather, mother  Father with eczema and cracking    Medications:  Current Outpatient Prescriptions   Medication Sig Dispense Refill     ketoconazole (NIZORAL) 2 % shampoo Apply and lather in to damp scalp, leave on skin 3-5 minutes and then rinse 3 times weekly. 120 mL 11     clobetasol (TEMOVATE) 0.05 % external solution Apply to affected areas of scalp (above ears) once daily. 50 mL 3     clindamycin-benzoyl peroxide (BENZACLIN) gel Apply topically daily       desogestrel-ethinyl estradiol (JSOS, VELIVET) 0.1/0.125/0.15 -0.025 MG per tablet Take 1 tablet by mouth daily       ibuprofen (ADVIL/MOTRIN) 200 MG tablet Take 200 mg by mouth as needed       tretinoin  microspheres (RETIN-A MICRO) 0.1 % topical gel Apply topically At Bedtime          Allergies   Allergen Reactions     Fexofenadine Other (See Comments)     Bloody nose       Review of Systems:  -As per HPI  -Constitutional: The patient denies fatigue, fevers, chills, unintended weight loss, and night sweats.  -HEENT: Patient denies nonhealing oral sores.  -Skin: As above in HPI. No additional skin concerns.    Physical exam:  Vitals: There were no vitals taken for this visit.  GEN: This is a well developed, well-nourished female in no acute distress, in a pleasant mood.    SKIN: Focused examination of the scalp was performed.  - Frontal hair line preserved  - Negative hair pull test in 4 quadrants  - Scalp healthy without erythema, scale  - Clinically continues to be a non scarring process without loss of follicular orifices  - No other lesions of concern on areas examined.     Labs:  A.  Skin, scalp, right parietal, horizontal: Nonscarring alopecia with miniaturization and a pronounced nonanagen shift - (see comment)   B.  Skin, scalp, right parietal, vertical: Nonscarring alopecia - (see comment)   COMMENT:   Both biopsies demonstrate similar features.  Given the near-absence of sebaceous glands in part A, and notably miniaturized sebaceous units in part B, psoriatic alopecia is suspected, though the differential diagnosis also includes alopecia areata.  Clinical correlation is recommended.     Impression/Plan:  1. Alopecia: ddx psoriatic alopecia v. diffuse alopecia areata based on biopsy results. Discussed both of these conditions in depth, including explanation of autoimmune nature of alopecia areata and attack of the pigmented hair. Discussed that natural history is difficult to predict based on the infrequency with which we see this pattern. Because psoriatic destructive alopecia is only seen in fulminant cases of scalp psoriasis, and this does not match her clinical scenario - I think that the diagnosis is  "best interpreted as diffuse alopecia areata. A meta-analysis from the [place] Journal of Dermatology, 2016, 175, id543-582, showed that diffuse alopecia areata or psoriatic alopecia has a significant impact on quality of life and should not be considered a \"cosmetic\" condition nor should the care and procedures be considered cosmetic either. I have written a letter and given a copy to the patient to submit to insurance and hopefully this will instill them to cover her care.    Continue ketoconazole 2% shampoo 3 times weekly (discussed Head and Shoulders and Selsun Blue to rotate in to make for shampooing once daily, can condition for the ends of the hair)    Can do intralesional triamcinolone injections vs topical steroids in the future pending insurance coverage.     Continue topical clobetasol 0.05% solution and apply to frontal, crown and sides of scalp (ears and above)    As far as Rogaine (minoxidil): this does not treat the inflammation at the base of the hair follicles, but it may help hair grow back faster IF it is able to grow (basically if the inflammation is under control).     Last TSH was 3.64 on 11/25/16    Follow-up in 2-4 weeks pending verdict in insurance coverage, earlier for new or changing lesions.     Staff Involved:  Staff    Scribe Disclosure:   I, Gopal Viveros, am serving as a scribe to document services personally performed by Dr. Fly Whaley, based on data collection and the provider's statements to me.     Staff Physician Comments:   I saw and evaluated the patient with the resident and I agree with the assessment and plan.  I was present for the examination.    Fly Whaley MD  Dermatology Staff Physician  , Department of Dermatology    "

## 2017-03-01 NOTE — LETTER
Date:March 28, 2017      Patient was self referred, no letter generated. Do not send.        [facility] Health Information

## 2017-03-01 NOTE — LETTER
March 1, 2017      To whom it may concern:    This is to certify that Daily Vyas is a patient under my care. She presented with an atypical hair loss process, not conforming to classic patterns of hair loss. My initial impression was pattern hair loss based on the nonscarring nature, diffuse pattern of involvement, and miniaturization of hairs. While no specific findings of alopecia areata were present (such as exclamation point hairs or perifollicular yellow dots on dermoscopy), as a tertiary care center with an expert in hair disease as our chair, I was concerned that this may not be a straightforward process, and recommended scalp biopsy. The clinical-pathologic correlation of the biopsy findings and clinical appearance are most consistent with a diffuse alopecia areata, an autoimmune disease directed against the hair follicle bulb.     While not directly threatening to Daily's health or longevity, alopecia areata has been shown to have a large impact on quality of life (Aquiles F, Nieves L, et al. Br J Dermatol 2016. 175:561-571). The rationale for treatment of this condition is to support her self-esteem, comfort in public, and functionality in society. It is also intended to prevent development of depression and/or isolation from family or friends.     Please do not hesitate to contact me if I can provide additional information or be of help to Daily in this appeal process.    Sincerely,  Fly Whaley MD  Dermatology Staff Physician  , Department of Dermatology

## 2017-04-11 ENCOUNTER — OFFICE VISIT (OUTPATIENT)
Dept: DERMATOLOGY | Facility: CLINIC | Age: 22
End: 2017-04-11

## 2017-04-11 DIAGNOSIS — L63.9 AA (ALOPECIA AREATA): Primary | ICD-10-CM

## 2017-04-11 ASSESSMENT — PAIN SCALES - GENERAL: PAINLEVEL: NO PAIN (0)

## 2017-04-11 NOTE — LETTER
Date:April 19, 2017      Patient was self referred, no letter generated. Do not send.        [facility] Health Information

## 2017-04-11 NOTE — PROGRESS NOTES
"[facility] Dermatology Note    Dermatology Problem List:  1. Diffuse alopecia areata s/p scalp biopsy 1/17/17 favoring psoriatic alopecia but does not match clinically  - ketoconazole 2% shampoo, clobetasol 0.05% solution, Rogaine (minoxidil) 5% foam  - s/p kenalog 10 injections 4/11/17    Encounter Date: Apr 11, 2017    CC:   Chief Complaint   Patient presents with     Derm Problem     Daily is here today for a follow up for AA. States it's been \"about the same,\" since last visit.       History of Present Illness:  Ms. Daily Vyas is a 21 year old female who presents as a follow-up for hair loss. The patient was last seen on 3/1/17 when she continued ketoconazole 2% shampoo and clobetasol 0.05% solution. Today the patient reports that er scalp and hair are about the same. She states that she notices the hair loss mainly on her pillow or in the shower after brushing her hair. Her mother states that insurance is helping to pay for some of the past treatments performed and clinic visits. Her mother states that paying out of pocket is doable for her family, but would prefer if it is covered by insurance. She is not using Rogaine currently, but is using the shampoo and solution. She notes that her nails have not changed. She also reports that she has noticed new acne lesions where she is placing the steroid topical medications. The patient reports no other lesions of concern at this time.     Otherwise, the patient reports no painful, bleeding, nonhealing, or pruritic lesions, and denies new or changing moles.    Labs:  A.  Skin, scalp, right parietal, horizontal: Nonscarring alopecia with miniaturization and a pronounced nonanagen shift - (see comment)   B.  Skin, scalp, right parietal, vertical: Nonscarring alopecia - (see comment)   COMMENT:   Both biopsies demonstrate similar features.  Given the near-absence of sebaceous glands in part A, and notably miniaturized sebaceous units in " part B, psoriatic alopecia is suspected, though the differential diagnosis also includes alopecia areata.  Clinical correlation is recommended.     Past Medical History:   Patient Active Problem List   Diagnosis     Alopecia     Past Medical History:   Diagnosis Date     Eczema      Nickel allergy     identified by allergy to belt buckle     Uncomplicated asthma      History reviewed. No pertinent surgical history.    Social History:  The patient is a student. The patient denies use of tanning beds.    Family History:  Eczema in maternal grandfather and mother.  Father with eczema and cracking.    Medications:  Current Outpatient Prescriptions   Medication Sig Dispense Refill     ketoconazole (NIZORAL) 2 % shampoo Apply and lather in to damp scalp, leave on skin 3-5 minutes and then rinse 3 times weekly. 120 mL 11     clobetasol (TEMOVATE) 0.05 % external solution Apply to affected areas of scalp (above ears) once daily. 50 mL 3     desogestrel-ethinyl estradiol (JOSS, VELIVET) 0.1/0.125/0.15 -0.025 MG per tablet Take 1 tablet by mouth daily       ibuprofen (ADVIL/MOTRIN) 200 MG tablet Take 200 mg by mouth as needed        Allergies   Allergen Reactions     Fexofenadine Other (See Comments)     Bloody nose     Review of Systems:  -Skin: As above in HPI. No additional skin concerns.    Physical exam:  Vitals: There were no vitals taken for this visit.  GEN: This is a well developed, well-nourished female in no acute distress, in a pleasant mood.    SKIN: Focused examination of the scalp was performed.  - Hair pull test: positive for 5 hairs on the left temporal  - Diffuse thinning on bitemporal scalp with increased density on the occiput and vertex  - Part width is slightly accentuated  - Good regrowth on frontal hairline measuring from 1-2 cm in a robust fashion  - Regrowth layers are 3-5 cm  - No other lesions of concern on areas examined.     Impression/Plan:  1. Diffuse alopecia areata s/p biopsy  - Discussion  of alopecia areata and this condition.   - Continue ketoconazole 2% shampoo 3 times weekly (discussed Head and Shoulders and Selsun Blue to rotate in to make for shampooing once daily, can condition for the ends of the hair).   - Continue topical clobetasol 0.05% solution and apply to frontal, crown and sides of scalp (ears and above).   - Start Rogaine (minoxidil) 5% foam - apply double dose once daily.  - Discussion of kenalog injections and how often to get these injections. Discussion of side effects including temporary atrophy of dermis.   - After positioning and cleansing with isopropyl alcohol, 2 total cc of Kenalog 10 mg/cc was injected into lesion(s) on the temporal scalp. The patient tolerated the procedure well and left the Dermatology clinic in good condition.  - The patient's mother is looking into insurance costs to know in the future.     Follow-up in 6 weeks, earlier for new or changing lesions.     Staff Involved:  Staff  Scribe Disclosure:   I, Reece Zacarias, am serving as a scribe to document services personally performed by Dr. Fly Whaley, based on data collection and the provider's statements to me.     Staff attestation:  The documentation recorded by the scribe accurately reflects the services I personally performed and the decisions I personally made.    Fly Whaley MD  Staff Dermatologist    Department of Dermatology

## 2017-04-11 NOTE — PATIENT INSTRUCTIONS
Rogaine 5% foam - apply a double dose once daily. Wait 5 minutes in between doses and let the foam dry.     Ketoconazole 2% shampoo - apply 3 times weekly, alternate with Head and Shoulders and/or Selsun Blue    Clobetasol 0.05% solution - apply to affected areas on scalp. If acne lesions develop, temporarily stop.

## 2017-04-11 NOTE — MR AVS SNAPSHOT
After Visit Summary   4/11/2017    Daily Vyas    MRN: [id number]           Patient Information     Date Of Birth          1995        Visit Information        Provider Department      4/11/2017 11:00 AM Fly Whaley MD [facility] Dermatology        Today's Diagnoses     AA (alopecia areata)    -  1      Care Instructions    Rogaine 5% foam - apply a double dose once daily. Wait 5 minutes in between doses and let the foam dry.     Ketoconazole 2% shampoo - apply 3 times weekly, alternate with Head and Shoulders and/or Selsun Blue    Clobetasol 0.05% solution - apply to affected areas on scalp. If acne lesions develop, temporarily stop.         Follow-ups after your visit        Your next 10 appointments already scheduled     May 19, 2017  2:30 PM CDT   (Arrive by 2:15 PM)   Return Visit with Fly Whaley MD   [facility] Dermatology ([facility])    [address] [place] [id number] [phone]              Who to contact     Please call your clinic at [phone] to:    Ask questions about your health    Make or cancel appointments    Discuss your medicines    Learn about your test results    Speak to your doctor   If you have compliments or concerns about an experience at your clinic, or if you wish to file a complaint, please contact [facility] Patient Relations at [phone] or email us at [email or web address]         Additional Information About Your Visit        MyChart Information     [employer] gives you secure access to your electronic health record. If you see a primary care provider, you can also send messages to your care team and make appointments. If you have questions, please call your primary care clinic.  If you do not have a primary care provider, please call [phone] and they will assist you.      [employer] is an electronic gateway that provides easy, online access to your  medical records. With [employer], you can request a clinic appointment, read your test results, renew a prescription or communicate with your care team.     To access your existing account, please contact your [facility] or call [phone] for assistance.        Care EveryWhere ID     This is your Care EveryWhere ID. This could be used by other organizations to access your [facility] medical records  [id number]         Blood Pressure from Last 3 Encounters:   No data found for BP    Weight from Last 3 Encounters:   No data found for Wt              We Performed the Following     INJECTION INTO SKIN LESIONS >7          Today's Medication Changes          These changes are accurate as of: 4/11/17 11:31 AM.  If you have any questions, ask your nurse or doctor.               Start taking these medicines.        Dose/Directions    triamcinolone acetonide 10 MG/ML injection   Commonly known as:  KENALOG   Used for:  AA (alopecia areata)   Started by:  Fly Whaley MD        Dose:  2 mL   Inject 2 mLs (20 mg) into the skin once for 1 dose   Quantity:  2 mL   Refills:  0            Where to get your medicines      Some of these will need a paper prescription and others can be bought over the counter.  Ask your nurse if you have questions.     You don't need a prescription for these medications     triamcinolone acetonide 10 MG/ML injection                Primary Care Provider    [facility]                Thank you!     Thank you for choosing [facility] DERMATOLOGY  for your care. Our goal is always to provide you with excellent care. Hearing back from our patients is one way we can continue to improve our services. Please take a few minutes to complete the written survey that you may receive in the mail after your visit with us. Thank you!             Your Updated Medication List - Protect others around you: Learn how to safely use, store and throw away your medicines at  www.disposemymeds.org.          This list is accurate as of: 4/11/17 11:31 AM.  Always use your most recent med list.                   Brand Name Dispense Instructions for use    clobetasol 0.05 % external solution    TEMOVATE    50 mL    Apply to affected areas of scalp (above ears) once daily.       desogestrel-ethinyl estradiol 0.1/0.125/0.15 -0.025 MG per tablet    JOSS, VELIVET     Take 1 tablet by mouth daily       ibuprofen 200 MG tablet    ADVIL/MOTRIN     Take 200 mg by mouth as needed       ketoconazole 2 % shampoo    NIZORAL    120 mL    Apply and lather in to damp scalp, leave on skin 3-5 minutes and then rinse 3 times weekly.       triamcinolone acetonide 10 MG/ML injection    KENALOG    2 mL    Inject 2 mLs (20 mg) into the skin once for 1 dose

## 2017-04-11 NOTE — NURSING NOTE
"Dermatology Rooming Note    Daily Vyas's goals for this visit include:   Chief Complaint   Patient presents with     Derm Problem     Daily is here today for a follow up for AA. States it's been \"about the same,\" since last visit.         Nevaeh Vaughn, CHRISTEN    "

## 2017-04-11 NOTE — Clinical Note
"4/11/2017       RE: Daily Vyas  [address] [facility] [address]     Dear Colleague,    Thank you for referring your patient, Daily Vyas, to the [facility] DERMATOLOGY at [facility]. Please see a copy of my visit note below.    [facility] Dermatology Note    Dermatology Problem List:  1. Diffuse alopecia areata s/p scalp biopsy 1/17/17 favoring psoriatic alopecia but does not match clinically  - ketoconazole 2% shampoo, clobetasol 0.05% solution, Rogaine (minoxidil) 5% foam  - s/p kenalog 10 injections 4/11/17    Encounter Date: Apr 11, 2017    CC:   Chief Complaint   Patient presents with     Derm Problem     Daily is here today for a follow up for AA. States it's been \"about the same,\" since last visit.       History of Present Illness:  Ms. Daily Vyas is a 21 year old female who presents as a follow-up for hair loss. The patient was last seen on 3/1/17 when she continued ketoconazole 2% shampoo and clobetasol 0.05% solution. Today the patient reports that er scalp and hair are about the same. She states that she notices the hair loss mainly on her pillow or in the shower after brushing her hair. Her mother states that insurance is helping to pay for some of the past treatments performed and clinic visits. Her mother states that paying out of pocket is doable for her family, but would prefer if it is covered by insurance. She is not using Rogaine currently, but is using the shampoo and solution. She notes that her nails have not changed. She also reports that she has noticed new acne lesions where she is placing the steroid topical medications. The patient reports no other lesions of concern at this time.     Otherwise, the patient reports no painful, bleeding, nonhealing, or pruritic lesions, and denies new or changing moles.    Labs:  A.  Skin, scalp, right parietal, horizontal: Nonscarring alopecia with miniaturization and a " pronounced nonanagen shift - (see comment)   B.  Skin, scalp, right parietal, vertical: Nonscarring alopecia - (see comment)   COMMENT:   Both biopsies demonstrate similar features.  Given the near-absence of sebaceous glands in part A, and notably miniaturized sebaceous units in part B, psoriatic alopecia is suspected, though the differential diagnosis also includes alopecia areata.  Clinical correlation is recommended.     Past Medical History:   Patient Active Problem List   Diagnosis     Alopecia     Past Medical History:   Diagnosis Date     Eczema      Nickel allergy     identified by allergy to belt buckle     Uncomplicated asthma      History reviewed. No pertinent surgical history.    Social History:  The patient is a student. The patient denies use of tanning beds.    Family History:  Eczema in maternal grandfather and mother.  Father with eczema and cracking.    Medications:  Current Outpatient Prescriptions   Medication Sig Dispense Refill     ketoconazole (NIZORAL) 2 % shampoo Apply and lather in to damp scalp, leave on skin 3-5 minutes and then rinse 3 times weekly. 120 mL 11     clobetasol (TEMOVATE) 0.05 % external solution Apply to affected areas of scalp (above ears) once daily. 50 mL 3     desogestrel-ethinyl estradiol (JOSS, VELIVET) 0.1/0.125/0.15 -0.025 MG per tablet Take 1 tablet by mouth daily       ibuprofen (ADVIL/MOTRIN) 200 MG tablet Take 200 mg by mouth as needed        Allergies   Allergen Reactions     Fexofenadine Other (See Comments)     Bloody nose     Review of Systems:  -Skin: As above in HPI. No additional skin concerns.    Physical exam:  Vitals: There were no vitals taken for this visit.  GEN: This is a well developed, well-nourished female in no acute distress, in a pleasant mood.    SKIN: Focused examination of the scalp was performed.  - Hair pull test: positive for 5 hairs on the left temporal  - Diffuse thinning on bitemporal scalp with increased density on the occiput  and vertex  - Part width is slightly accentuated  - Good regrowth on frontal hairline measuring from 1-2 cm in a robust fashion  - Regrowth layers are 3-5 cm  - No other lesions of concern on areas examined.     Impression/Plan:  1. Diffuse alopecia areata s/p biopsy  - Discussion of alopecia areata and this condition.   - Continue ketoconazole 2% shampoo 3 times weekly (discussed Head and Shoulders and Selsun Blue to rotate in to make for shampooing once daily, can condition for the ends of the hair).   - Continue topical clobetasol 0.05% solution and apply to frontal, crown and sides of scalp (ears and above).   - Start Rogaine (minoxidil) 5% foam - apply double dose once daily.  - Discussion of kenalog injections and how often to get these injections. Discussion of side effects including temporary atrophy of dermis.   - After positioning and cleansing with isopropyl alcohol, 2 total cc of Kenalog 10 mg/cc was injected into lesion(s) on the temporal scalp. The patient tolerated the procedure well and left the Dermatology clinic in good condition.  - The patient's mother is looking into insurance costs to know in the future.       Follow-up in 6 weeks, earlier for new or changing lesions.     Staff Involved:  Staff  Scribe Disclosure:   I, Reece Zacarias, am serving as a scribe to document services personally performed by Dr. Fyl Whaley, based on data collection and the provider's statements to me.     Again, thank you for allowing me to participate in the care of your patient.      Sincerely,    lFy Whaley MD

## 2017-05-19 ENCOUNTER — OFFICE VISIT (OUTPATIENT)
Dept: DERMATOLOGY | Facility: CLINIC | Age: 22
End: 2017-05-19

## 2017-05-19 DIAGNOSIS — L63.9 AA (ALOPECIA AREATA): Primary | ICD-10-CM

## 2017-05-19 ASSESSMENT — PAIN SCALES - GENERAL: PAINLEVEL: NO PAIN (0)

## 2017-05-19 NOTE — NURSING NOTE
"Dermatology Rooming Note    Daily Vyas's goals for this visit include:   Chief Complaint   Patient presents with     Hair Loss     Daily is here today for a hair loss follow up. Daily notes\" I think I am having a increase in shedding\"     Jamaica Mcgregor MA    "

## 2017-05-19 NOTE — LETTER
"5/19/2017       RE: Daily Vyas  [address] [facility] [address]     Dear Colleague,    Thank you for referring your patient, Daily Vyas, to the [facility] DERMATOLOGY at [facility]. Please see a copy of my visit note below.    [facility] Dermatology Note    Dermatology Problem List:  1. Diffuse alopecia areata s/p scalp biopsy 1/17/17  - s/p kenalog 10 injections 4/11/17, 5/19/17  - ketoconazole 2% shampoo, clobetasol 0.05% solution, Rogaine (minoxidil) 5% foam    Encounter Date: May 19, 2017    CC:   Chief Complaint   Patient presents with     Hair Loss     Daily is here today for a hair loss follow up. Daily notes\" I think I am having a increase in shedding\"     History of Present Illness:  Ms. Daily Vyas is a 21 year old female who presents as a follow-up for hair loss. Today the patient reports that she is doing all right, but her hair loss is worst. There has been an increase in shedding mainly on the pillow and she noticed this about 2 weeks after she started Rogaine. She has not noticed any acceleration in shedding. She is noticing more emptiness when she is applying the Rogaine. Her menstrual cycle is regular, every monthly, and not particularly heavier than average. The mother states that insurance issues are totally clear now. The patient reports no other lesions of concern at this time.     Otherwise, the patient reports no painful, bleeding, nonhealing, or pruritic lesions, and denies new or changing moles.    Labs:  A.  Skin, scalp, right parietal, horizontal: Nonscarring alopecia with miniaturization and a pronounced nonanagen shift - (see comment)   B.  Skin, scalp, right parietal, vertical: Nonscarring alopecia - (see comment)   COMMENT:   Both biopsies demonstrate similar features.  Given the near-absence of sebaceous glands in part A, and notably miniaturized sebaceous units in part B, psoriatic alopecia is " suspected, though the differential diagnosis also includes alopecia areata.  Clinical correlation is recommended.     Past Medical History:   Patient Active Problem List   Diagnosis     Alopecia     Past Medical History:   Diagnosis Date     Eczema      Nickel allergy     identified by allergy to belt buckle     Uncomplicated asthma      History reviewed. No pertinent surgical history.    Social History:  The patient is a student. The patient denies use of tanning beds.    Family History:  Eczema in maternal grandfather and mother.  Father with eczema and cracking.    Medications:  Current Outpatient Prescriptions   Medication Sig Dispense Refill     ketoconazole (NIZORAL) 2 % shampoo Apply and lather in to damp scalp, leave on skin 3-5 minutes and then rinse 3 times weekly. 120 mL 11     clobetasol (TEMOVATE) 0.05 % external solution Apply to affected areas of scalp (above ears) once daily. 50 mL 3     desogestrel-ethinyl estradiol (JOSS, VELIVET) 0.1/0.125/0.15 -0.025 MG per tablet Take 1 tablet by mouth daily       ibuprofen (ADVIL/MOTRIN) 200 MG tablet Take 200 mg by mouth as needed        Allergies   Allergen Reactions     Fexofenadine Other (See Comments)     Bloody nose     Review of Systems:  -Skin: As above in HPI. No additional skin concerns.    Physical exam:  Vitals: There were no vitals taken for this visit.  GEN: This is a well developed, well-nourished female in no acute distress, in a pleasant mood.    SKIN: Focused examination of the scalp was performed.  - Hair pull test: positive for 3 hairs on the left temporal scalp  - Frontal hairline still intact  - Diffuse non scaring alopecia, most prominent on the bitemporal scalp with some regrowth of vellus hairs  - Only sites on the right temporal scalp  - No other lesions of concern on areas examined.     Impression/Plan:  1. Diffuse alopecia s/p biopsy 1/17/17 (read as psoriatic alopecia due to dropout of sebaceous structures [but no history of  psoriasis nor presence of severe destructive inflammation], but with differential consideration of diffuse alopecia areata, which has been the working differential)  - Photos obtained at today's visit.   - Continue ketoconazole 2% shampoo 3 times weekly (discussed Head and Shoulders and Selsun Blue to rotate in to make for shampooing once daily, can condition for the ends of the hair).   - Continue topical clobetasol 0.05% solution and apply to frontal, crown and sides of scalp (ears and above).   - Continue Rogaine (minoxidil) 5% foam - apply double dose once daily.  - Discussion of spironolactone and the side effects including spotting within the menstrual cycle, muscle cramps, and light headedness.    - After positioning and cleansing with isopropyl alcohol, 2 total cc of Kenalog 10 mg/cc was injected into 20 lesion(s) on the right temporal scalp. The patient tolerated the procedure well and left the Dermatology clinic in good condition.   - Only right temporal scalp injected. Compare sides of scalp and photos at next visit. If no change, consider biopsy and then oral medications and clinical trials. If regrowth, continue injections.   - Labs: ferritin 28 in November, TSH normal (see [facility] in CareEverywhere)   - consider repeat ferritin, vitamin D    Follow-up in 4 weeks, earlier for new or changing lesions.     Staff Involved:  Staff  Scribe Disclosure:   I, Reece Zacarias, am serving as a scribe to document services personally performed by Dr. Fly Whaley, based on data collection and the provider's statements to me.     Staff attestation:  The documentation recorded by the scribe accurately reflects the services I personally performed and the decisions I personally made.    Fly Whaley MD  Staff Dermatologist    Department of Dermatology      Again, thank you for allowing me to participate in the care of your patient.      Sincerely,    Fly Whaley MD

## 2017-05-19 NOTE — MR AVS SNAPSHOT
After Visit Summary   5/19/2017    Daily Vyas    MRN: [id number]           Patient Information     Date Of Birth          1995        Visit Information        Provider Department      5/19/2017 2:30 PM Fly Whaley MD [facility] Dermatology        Today's Diagnoses     AA (alopecia areata)    -  1       Follow-ups after your visit        Your next 10 appointments already scheduled     Garth 15, 2017  9:00 AM CDT   (Arrive by 8:45 AM)   Return Visit with Lissa Otto MD   [facility] Dermatology ([facility] [place])    [address] [place] [id number] [phone]              Who to contact     Please call your clinic at [phone] to:    Ask questions about your health    Make or cancel appointments    Discuss your medicines    Learn about your test results    Speak to your doctor   If you have compliments or concerns about an experience at your clinic, or if you wish to file a complaint, please contact [facility] Patient Relations at [phone] or email us at [email or web address]         Additional Information About Your Visit        MyChart Information     [employer] gives you secure access to your electronic health record. If you see a primary care provider, you can also send messages to your care team and make appointments. If you have questions, please call your primary care clinic.  If you do not have a primary care provider, please call [phone] and they will assist you.      [employer] is an electronic gateway that provides easy, online access to your medical records. With [employer], you can request a clinic appointment, read your test results, renew a prescription or communicate with your care team.     To access your existing account, please contact your [facility] or call [phone] for assistance.        Care EveryWhere ID     This is your Care EveryWhere ID. This  could be used by other organizations to access your [facility] [place] medical records  [id number]         Blood Pressure from Last 3 Encounters:   No data found for BP    Weight from Last 3 Encounters:   No data found for Wt              Today, you had the following     No orders found for display       Primary Care Provider    Md Other Clinic                Thank you!     Thank you for choosing [facility] DERMATOLOG[facility]  for your care. Our goal is always to provide you with excellent care. Hearing back from our patients is one way we can continue to improve our services. Please take a few minutes to complete the written survey that you may receive in the mail after your visit with us. Thank you!             Your Updated Medication List - Protect others around you: Learn how to safely use, store and throw away your medicines at www.disposemymeds.org.          This list is accurate as of: 5/19/17  3:38 PM.  Always use your most recent med list.                   Brand Name Dispense Instructions for use    clobetasol 0.05 % external solution    TEMOVATE    50 mL    Apply to affected areas of scalp (above ears) once daily.       desogestrel-ethinyl estradiol 0.1/0.125/0.15 -0.025 MG per tablet    JOSS, VELIVET     Take 1 tablet by mouth daily       ibuprofen 200 MG tablet    ADVIL/MOTRIN     Take 200 mg by mouth as needed       ketoconazole 2 % shampoo    NIZORAL    120 mL    Apply and lather in to damp scalp, leave on skin 3-5 minutes and then rinse 3 times weekly.

## 2017-05-19 NOTE — LETTER
Date:June 13, 2017      Patient was self referred, no letter generated. Do not send.        [facility] Health Information

## 2017-05-19 NOTE — PROGRESS NOTES
"[facility] Dermatology Note    Dermatology Problem List:  1. Diffuse alopecia areata s/p scalp biopsy 1/17/17  - s/p kenalog 10 injections 4/11/17, 5/19/17  - ketoconazole 2% shampoo, clobetasol 0.05% solution, Rogaine (minoxidil) 5% foam    Encounter Date: May 19, 2017    CC:   Chief Complaint   Patient presents with     Hair Loss     Daily is here today for a hair loss follow up. Daily notes\" I think I am having a increase in shedding\"     History of Present Illness:  Ms. Daily Vyas is a 21 year old female who presents as a follow-up for hair loss. Today the patient reports that she is doing all right, but her hair loss is worst. There has been an increase in shedding mainly on the pillow and she noticed this about 2 weeks after she started Rogaine. She has not noticed any acceleration in shedding. She is noticing more emptiness when she is applying the Rogaine. Her menstrual cycle is regular, every monthly, and not particularly heavier than average. The mother states that insurance issues are totally clear now. The patient reports no other lesions of concern at this time.     Otherwise, the patient reports no painful, bleeding, nonhealing, or pruritic lesions, and denies new or changing moles.    Labs:  A.  Skin, scalp, right parietal, horizontal: Nonscarring alopecia with miniaturization and a pronounced nonanagen shift - (see comment)   B.  Skin, scalp, right parietal, vertical: Nonscarring alopecia - (see comment)   COMMENT:   Both biopsies demonstrate similar features.  Given the near-absence of sebaceous glands in part A, and notably miniaturized sebaceous units in part B, psoriatic alopecia is suspected, though the differential diagnosis also includes alopecia areata.  Clinical correlation is recommended.     Past Medical History:   Patient Active Problem List   Diagnosis     Alopecia     Past Medical History:   Diagnosis Date     Eczema      Nickel allergy     identified " by allergy to belt buckle     Uncomplicated asthma      History reviewed. No pertinent surgical history.    Social History:  The patient is a student. The patient denies use of tanning beds.    Family History:  Eczema in maternal grandfather and mother.  Father with eczema and cracking.    Medications:  Current Outpatient Prescriptions   Medication Sig Dispense Refill     ketoconazole (NIZORAL) 2 % shampoo Apply and lather in to damp scalp, leave on skin 3-5 minutes and then rinse 3 times weekly. 120 mL 11     clobetasol (TEMOVATE) 0.05 % external solution Apply to affected areas of scalp (above ears) once daily. 50 mL 3     desogestrel-ethinyl estradiol (JOSS, VELIVET) 0.1/0.125/0.15 -0.025 MG per tablet Take 1 tablet by mouth daily       ibuprofen (ADVIL/MOTRIN) 200 MG tablet Take 200 mg by mouth as needed        Allergies   Allergen Reactions     Fexofenadine Other (See Comments)     Bloody nose     Review of Systems:  -Skin: As above in HPI. No additional skin concerns.    Physical exam:  Vitals: There were no vitals taken for this visit.  GEN: This is a well developed, well-nourished female in no acute distress, in a pleasant mood.    SKIN: Focused examination of the scalp was performed.  - Hair pull test: positive for 3 hairs on the left temporal scalp  - Frontal hairline still intact  - Diffuse non scaring alopecia, most prominent on the bitemporal scalp with some regrowth of vellus hairs  - Only sites on the right temporal scalp  - No other lesions of concern on areas examined.     Impression/Plan:  1. Diffuse alopecia s/p biopsy 1/17/17 (read as psoriatic alopecia due to dropout of sebaceous structures [but no history of psoriasis nor presence of severe destructive inflammation], but with differential consideration of diffuse alopecia areata, which has been the working differential)  - Photos obtained at today's visit.   - Continue ketoconazole 2% shampoo 3 times weekly (discussed Head and Shoulders and  Selsun Blue to rotate in to make for shampooing once daily, can condition for the ends of the hair).   - Continue topical clobetasol 0.05% solution and apply to frontal, crown and sides of scalp (ears and above).   - Continue Rogaine (minoxidil) 5% foam - apply double dose once daily.  - Discussion of spironolactone and the side effects including spotting within the menstrual cycle, muscle cramps, and light headedness.    - After positioning and cleansing with isopropyl alcohol, 2 total cc of Kenalog 10 mg/cc was injected into 20 lesion(s) on the right temporal scalp. The patient tolerated the procedure well and left the Dermatology clinic in good condition.   - Only right temporal scalp injected. Compare sides of scalp and photos at next visit. If no change, consider biopsy and then oral medications and clinical trials. If regrowth, continue injections.   - Labs: ferritin 28 in November, TSH normal (see [facility] in CareEverywhere)   - consider repeat ferritin, vitamin D    Follow-up in 4 weeks, earlier for new or changing lesions.     Staff Involved:  Staff  Scribe Disclosure:   I, Reece Zacarias, am serving as a scribe to document services personally performed by Dr. Fly Whaley, based on data collection and the provider's statements to me.     Staff attestation:  The documentation recorded by the scribe accurately reflects the services I personally performed and the decisions I personally made.    Fly Whaley MD  Staff Dermatologist    Department of Dermatology

## 2017-05-24 ENCOUNTER — TELEPHONE (OUTPATIENT)
Dept: DERMATOLOGY | Facility: CLINIC | Age: 22
End: 2017-05-24

## 2017-05-24 NOTE — TELEPHONE ENCOUNTER
I called and spoke with Ashleigh ( Mom) she states she would like to have her daughter see Dr. Huston for hair loss since Dr. Whaley will soon be leaving. I explained that we cant just schedule Daily with Dr. Huston that she goes based off of referral and even if a referral is placed that does not mean that the patient will be picked up by that provider.  I also noted that Dr. Huston is booked right now up until September . Ashleigh understood and would like a referral from Dr. Whaley to see Dr. Huston.    Routing to Dr. Whaley

## 2017-05-24 NOTE — TELEPHONE ENCOUNTER
----- Message from Petr Montes sent at 5/24/2017  1:22 PM CDT -----  Regarding: call back  Contact: [phone]  Patient mother Lida is requesting a call back concerning scheduling an appointment for hair loss with Izabella. All medical records concerning issue is from [facility] and she is looking for someone new , due to [facility].      Thank You  JH

## 2017-05-25 NOTE — TELEPHONE ENCOUNTER
This patient has a diffuse, non-scarring alopecia with biopsy findings interpreted as diffuse alopecia areata vs psoriatic alopecia (the latter of which does not correlate clinically). I also suspect some role of androgenic alopecia, but this was not a prominent finding pathologically.     I would very much appreciate Dr. Otto's and Dr. Huston's input, and feel that the current scheduling is appropriate. I will, however, honor this request and forward the chart to Dr. Huston for review.

## 2017-06-06 NOTE — TELEPHONE ENCOUNTER
Left message at [phone] and Mothers (Ashleigh) number at [phone] to call clinic. Please let them know that pt has appointment with Dr Otto and it is not guaranteed that Dr Huston will be able to see patient even if she is staff with Dr Otto.

## 2017-06-07 NOTE — TELEPHONE ENCOUNTER
Daily has been accepted by Dr. Huston. Appointment with Dr. Otto has been cancelled. Daily will await to be contacted for an appointment. Amirah called and spoke with Daily yesterday, 6/6/17 and informed her of this information.

## 2017-06-09 ENCOUNTER — TELEPHONE (OUTPATIENT)
Dept: DERMATOLOGY | Facility: CLINIC | Age: 22
End: 2017-06-09

## 2017-06-09 NOTE — TELEPHONE ENCOUNTER
Called Daily back as noted below. Daiyl will continue her current regimen until being evaluated in clinic.

## 2017-06-09 NOTE — TELEPHONE ENCOUNTER
----- Message from Juliette Knowles LPN sent at 6/9/2017  7:55 AM CDT -----  Regarding: FW: hi, can you let her know that until I see her and get a chance to evaluate, it is very difficult to make recommendations.  Contact: [phone]      ----- Message -----     From: Velvet Huston MD     Sent: 6/8/2017   5:37 PM       To: Juliette Knowles LPN  Subject: hi, can you let her know that until I see he#        ----- Message -----     From: Juliette Knowles LPN     Sent: 6/7/2017  10:29 AM       To: Velvet Huston MD, #    Mother sixto calling for pt ([phone])   They were notified that pt has been excepted into Your clinic, they are wondering wht type of treatment she should be doing until she can get an appt.  Continue to come in for injections, any certain products.      Please call one of the two to update them on recommendations..    Thank you Juliette

## 2017-06-12 ENCOUNTER — TELEPHONE (OUTPATIENT)
Dept: DERMATOLOGY | Facility: CLINIC | Age: 22
End: 2017-06-12

## 2017-06-14 NOTE — TELEPHONE ENCOUNTER
Spoke with patient and her concern about waiting until September is that she has been getting ketoconazole injections and recommendations at last visit with Dr Whaley were to f/u in 4 weeks and re-evaluate if should continue injections or try another therapy. Patient's appt with Dr Otto for tomorrow had been cancelled previously when pt transferred care with Dr Huston but appt is not until September. She is agreeable to come into clinic on 6/16 at 2:00 with Dr Garcia. Note from visit on 5/19 below.    Impression/Plan:  1. Diffuse alopecia s/p biopsy 1/17/17 (read as psoriatic alopecia due to dropout of sebaceous structures [but no history of psoriasis nor presence of severe destructive inflammation], but with differential consideration of diffuse alopecia areata, which has been the working differential)  - Photos obtained at today's visit.   - Continue ketoconazole 2% shampoo 3 times weekly (discussed Head and Shoulders and Selsun Blue to rotate in to make for shampooing once daily, can condition for the ends of the hair).   - Continue topical clobetasol 0.05% solution and apply to frontal, crown and sides of scalp (ears and above).   - Continue Rogaine (minoxidil) 5% foam - apply double dose once daily.  - Discussion of spironolactone and the side effects including spotting within the menstrual cycle, muscle cramps, and light headedness.    - After positioning and cleansing with isopropyl alcohol, 2 total cc of Kenalog 10 mg/cc was injected into 20 lesion(s) on the right temporal scalp. The patient tolerated the procedure well and left the Dermatology clinic in good condition.                        - Only right temporal scalp injected. Compare sides of scalp and photos at next visit. If no change, consider biopsy and then oral medications and clinical trials. If regrowth, continue injections.   - Labs: ferritin 28 in November, TSH normal (see [facility] in CareEverywhere)                        - consider repeat  ferritin, vitamin D     Follow-up in 4 weeks, earlier for new or changing lesions

## 2017-06-16 ENCOUNTER — OFFICE VISIT (OUTPATIENT)
Dept: DERMATOLOGY | Facility: CLINIC | Age: 22
End: 2017-06-16

## 2017-06-16 DIAGNOSIS — L63.9 AA (ALOPECIA AREATA): ICD-10-CM

## 2017-06-16 DIAGNOSIS — Z51.81 MEDICATION MONITORING ENCOUNTER: Primary | ICD-10-CM

## 2017-06-16 DIAGNOSIS — L65.9 ALOPECIA: ICD-10-CM

## 2017-06-16 DIAGNOSIS — R79.0 LOW FERRITIN: ICD-10-CM

## 2017-06-16 LAB
BASOPHILS # BLD AUTO: 0 10E9/L (ref 0–0.2)
BASOPHILS NFR BLD AUTO: 0.4 %
DIFFERENTIAL METHOD BLD: NORMAL
EOSINOPHIL # BLD AUTO: 0.1 10E9/L (ref 0–0.7)
EOSINOPHIL NFR BLD AUTO: 1.3 %
ERYTHROCYTE [DISTWIDTH] IN BLOOD BY AUTOMATED COUNT: 12.7 % (ref 10–15)
FERRITIN SERPL-MCNC: 11 NG/ML (ref 12–150)
HCT VFR BLD AUTO: 40.8 % (ref 35–47)
HGB BLD-MCNC: 13.7 G/DL (ref 11.7–15.7)
IMM GRANULOCYTES # BLD: 0 10E9/L (ref 0–0.4)
IMM GRANULOCYTES NFR BLD: 0.4 %
LYMPHOCYTES # BLD AUTO: 2.2 10E9/L (ref 0.8–5.3)
LYMPHOCYTES NFR BLD AUTO: 22.7 %
MCH RBC QN AUTO: 30.2 PG (ref 26.5–33)
MCHC RBC AUTO-ENTMCNC: 33.6 G/DL (ref 31.5–36.5)
MCV RBC AUTO: 90 FL (ref 78–100)
MONOCYTES # BLD AUTO: 0.7 10E9/L (ref 0–1.3)
MONOCYTES NFR BLD AUTO: 7.3 %
NEUTROPHILS # BLD AUTO: 6.6 10E9/L (ref 1.6–8.3)
NEUTROPHILS NFR BLD AUTO: 67.9 %
NRBC # BLD AUTO: 0 10*3/UL
NRBC BLD AUTO-RTO: 0 /100
PLATELET # BLD AUTO: 332 10E9/L (ref 150–450)
RBC # BLD AUTO: 4.54 10E12/L (ref 3.8–5.2)
TSH SERPL DL<=0.005 MIU/L-ACNC: 2.21 MU/L (ref 0.4–4)
WBC # BLD AUTO: 9.7 10E9/L (ref 4–11)

## 2017-06-16 NOTE — PATIENT INSTRUCTIONS
Dermatology Rooming Note    Daily Vyas's goals for this visit include:   Chief Complaint   Patient presents with     RECHECK     4 week follow up - Diffuse Alopecia - discuss options       Is a scribe okay for this visit: YES    Are records needed for this visit(If yes, obtain release of information):  NO     Vitals: There were no vitals taken for this visit.    Referring Provider:  Referred Self, MD  No address on file    Emily Mane CMA

## 2017-06-16 NOTE — LETTER
6/16/2017       RE: Daily Vyas  [address]  [place] [address]     Dear Colleague,    Thank you for referring your patient, Daily Vyas, to the [facility] DERMATOLOGY at [facility]. Please see a copy of my visit note below.    [facility] Dermatology Note    Dermatology Problem List:  1. Diffuse alopecia areata s/p scalp biopsy 1/17/17  - s/p kenalog 10 injections 4/11/17, 5/19/17  - ketoconazole 2% shampoo, clobetasol 0.05% solution, Rogaine (minoxidil) 5% foam    Encounter Date: Jun 16, 2017    CC:   No chief complaint on file.    History of Present Illness:  Ms. Daily Vyas is a 21 year old female who presents as a follow-up for hair loss.     The patient's hair loss began suddenly in August of 1 year. The preceding year, the patient denies significant stress; no death in the family, no hospitalizations, no intentional diet change, no significant stress with school.    Daily does not subscribe to any special diets. She may or not eat meat every day. She eats dairy daily.    The patient has been on her birth control since long before the onset of alopecia. Menses are regular in frequency and severity.     Daily's mother mentioned the possibility of Lyme's disease as causative for hair loss. Daily has not been in the woods in recent years. No bullseye rash. No tick exposure. No joint pain. No headaches. No drooping face.    She was last seen by Dr. Whaley (Dermatology) when her regimen for her nonscarring alopecia was clarified: she was continued on ketoconazole 2% shampoo 3 times weekly to be alternated with Head and Shoulders and/or Selsun Blue; topical clobetasol solution to frontal scalp, crown, and sides of scalp; Rogaine 5% foam; Kenalog 10 mg/cc was injected into 20 sites within the right temporal scalp.      Labs:  A.  Skin, scalp, right parietal, horizontal: Nonscarring alopecia with miniaturization and a pronounced  nonanagen shift - (see comment)   B.  Skin, scalp, right parietal, vertical: Nonscarring alopecia - (see comment)   COMMENT:   Both biopsies demonstrate similar features.  Given the near-absence of sebaceous glands in part A, and notably miniaturized sebaceous units in part B, psoriatic alopecia is suspected, though the differential diagnosis also includes alopecia areata.  Clinical correlation is recommended.     FERRITIN 28 ng/mL November 2016. TSH normal    Past Medical History:   Patient Active Problem List   Diagnosis     Alopecia     Past Medical History:   Diagnosis Date     Eczema      Nickel allergy     identified by allergy to belt buckle     Uncomplicated asthma      No past surgical history on file.    Social History:  The patient is a student. The patient denies use of tanning beds.    Family History:  Eczema in maternal grandfather and mother.  Father with eczema and cracking.  No family history of hair loss in mom or dad.    Medications:  Current Outpatient Prescriptions   Medication Sig Dispense Refill     ketoconazole (NIZORAL) 2 % shampoo Apply and lather in to damp scalp, leave on skin 3-5 minutes and then rinse 3 times weekly. 120 mL 11     clobetasol (TEMOVATE) 0.05 % external solution Apply to affected areas of scalp (above ears) once daily. 50 mL 3     desogestrel-ethinyl estradiol (JOSS, VELIVET) 0.1/0.125/0.15 -0.025 MG per tablet Take 1 tablet by mouth daily       ibuprofen (ADVIL/MOTRIN) 200 MG tablet Take 200 mg by mouth as needed        Allergies   Allergen Reactions     Fexofenadine Other (See Comments)     Bloody nose     Review of Systems:  -Skin: As above in HPI. No additional skin concerns.  -Const. No fevers, chills, night sweats. No fatigue.  -MSK: No joint pain  -HEENT: No mouth sores.     Physical exam:  Vitals: There were no vitals taken for this visit.  GEN: This is a well developed, well-nourished female in no acute distress, in a pleasant mood.    SKIN: Sun-exposed skin,  which includes the head/face, neck, both arms, digits, and/or nails was examined.  -Hair pull test to frontal, temporal, parietal, occipital, and vertex scalp is negative.   -No layers of regrowth discernable within injected lena in the right parietal scalp   -Hair on arms normal  -Eyebrows and eyelashes are normal  -Part width 1.4  -Thinning to crown and frontal scalp  -Temporal thinning.  -Thinning along parietal scalp.   Follicular ostia are intact  - No other lesions of concern on areas examined.     Impression/Plan:  1. Diffuse alopecia of yet-unclear etiology s/p biopsy 1/17/17. Birth control has been long-term (since well before onset of alopecia). Clear androgenetic component. Kenalog injections of equivocal efficacy:    Continue ketoconazole 2% shampoo. Shampoo at least 3-4 times per weeks.    We will defer Kenalog injections until a more definitive diagnosis can be reached     Strongly recommend repeat scalp biopsy at (or before) visit Dr. Huston for more definitive diagnosis.    We will draw preliminary hair loss labs anticipating her visit with Dr. Huston. Draw for CBC with diff, TSH with reflex, ferritin, vitamin D deficiency.     Follow-up in 4 weeks, earlier for new or changing lesions.     Scribe Disclosure:   I, Valente Schaffer, am serving as a scribe to document services personally performed by Dr. Anabella Garcia, based on data collection and the provider's statements to me.       Anabella Garcia MD

## 2017-06-16 NOTE — MR AVS SNAPSHOT
After Visit Summary   6/16/2017    Daily Vyas    MRN: [id number]           Patient Information     Date Of Birth          1995        Visit Information        Provider Department      6/16/2017 2:00 PM Anabella Garcia MD [facility] Dermatology        Today's Diagnoses     Medication monitoring encounter    -  1    AA (alopecia areata)        Alopecia        Low ferritin          Care Instructions    Dermatology Rooming Note    Daily Vyas's goals for this visit include:   Chief Complaint   Patient presents with     RECHECK     4 week follow up - Diffuse Alopecia - discuss options       Is a scribe okay for this visit: YES    Are records needed for this visit(If yes, obtain release of information):  NO     Vitals: There were no vitals taken for this visit.    Referring Provider:  Referred Self, MD  No address on file    Emily Mane CMA            Follow-ups after your visit        Your next 10 appointments already scheduled     Sep 11, 2017  7:30 AM CDT   (Arrive by 7:15 AM)   RETURN HAIRLOSS with Velvet Huston MD   [facility] Dermatology ([facility])    [address] [place] [id number] [phone]              Who to contact     Please call your clinic at [phone] to:    Ask questions about your health    Make or cancel appointments    Discuss your medicines    Learn about your test results    Speak to your doctor   If you have compliments or concerns about an experience at your clinic, or if you wish to file a complaint, please contact [facility] Patient Relations at [phone] or email us at [email or web address]         Additional Information About Your Visit        MyChart Information     Paddle8t gives you secure access to your electronic health record. If you see a primary care provider, you can also send messages to your care team and make appointments. If you have  questions, please call your primary care clinic.  If you do not have a primary care provider, please call [phone] and they will assist you.      Sponge is an electronic gateway that provides easy, online access to your medical records. With Sponge, you can request a clinic appointment, read your test results, renew a prescription or communicate with your care team.     To access your existing account, please contact your [facility] or call [phone] for assistance.        Care EveryWhere ID     This is your Care EveryWhere ID. This could be used by other organizations to access your [facility] medical records  [id number]         Blood Pressure from Last 3 Encounters:   No data found for BP    Weight from Last 3 Encounters:   No data found for Wt              We Performed the Following     CBC with platelets differential        Primary Care Provider    M[facility] Other Clinic                Thank you!     Thank you for choosing [facility] DERMATOLOGY  for your care. Our goal is always to provide you with excellent care. Hearing back from our patients is one way we can continue to improve our services. Please take a few minutes to complete the written survey that you may receive in the mail after your visit with us. Thank you!             Your Updated Medication List - Protect others around you: Learn how to safely use, store and throw away your medicines at www.disposemymeds.org.          This list is accurate as of: 6/16/17 11:59 PM.  Always use your most recent med list.                   Brand Name Dispense Instructions for use    clobetasol 0.05 % external solution    TEMOVATE    50 mL    Apply to affected areas of scalp (above ears) once daily.       desogestrel-ethinyl estradiol 0.1/0.125/0.15 -0.025 MG per tablet    JOSS, VELIVET     Take 1 tablet by mouth daily       ibuprofen 200 MG tablet    ADVIL/MOTRIN     Take 200 mg by mouth as needed       ketoconazole 2 % shampoo     NIZORAL    120 mL    Apply and lather in to damp scalp, leave on skin 3-5 minutes and then rinse 3 times weekly.

## 2017-06-16 NOTE — PROGRESS NOTES
[facility] Dermatology Note    Dermatology Problem List:  1. Diffuse alopecia areata s/p scalp biopsy 1/17/17  - s/p kenalog 10 injections 4/11/17, 5/19/17  - ketoconazole 2% shampoo, clobetasol 0.05% solution, Rogaine (minoxidil) 5% foam    Encounter Date: Jun 16, 2017    CC:   No chief complaint on file.    History of Present Illness:  Ms. Daily Vyas is a 21 year old female who presents as a follow-up for hair loss.     The patient's hair loss began suddenly in August of 1 year. The preceding year, the patient denies significant stress; no death in the family, no hospitalizations, no intentional diet change, no significant stress with school.    Daily does not subscribe to any special diets. She may or not eat meat every day. She eats dairy daily.    The patient has been on her birth control since long before the onset of alopecia. Menses are regular in frequency and severity.     Daily's mother mentioned the possibility of Lyme's disease as causative for hair loss. Daily has not been in the woods in recent years. No bullseye rash. No tick exposure. No joint pain. No headaches. No drooping face.    She was last seen by Dr. Whaley (Dermatology) when her regimen for her nonscarring alopecia was clarified: she was continued on ketoconazole 2% shampoo 3 times weekly to be alternated with Head and Shoulders and/or [place] Blue; topical clobetasol solution to frontal scalp, crown, and sides of scalp; Rogaine 5% foam; Kenalog 10 mg/cc was injected into 20 sites within the right temporal scalp.      Labs:  A.  Skin, scalp, right parietal, horizontal: Nonscarring alopecia with miniaturization and a pronounced nonanagen shift - (see comment)   B.  Skin, scalp, right parietal, vertical: Nonscarring alopecia - (see comment)   COMMENT:   Both biopsies demonstrate similar features.  Given the near-absence of sebaceous glands in part A, and notably miniaturized sebaceous units in part B,  psoriatic alopecia is suspected, though the differential diagnosis also includes alopecia areata.  Clinical correlation is recommended.     FERRITIN 28 ng/mL November 2016. TSH normal    Past Medical History:   Patient Active Problem List   Diagnosis     Alopecia     Past Medical History:   Diagnosis Date     Eczema      Nickel allergy     identified by allergy to belt buckle     Uncomplicated asthma      No past surgical history on file.    Social History:  The patient is a student. The patient denies use of tanning beds.    Family History:  Eczema in maternal grandfather and mother.  Father with eczema and cracking.  No family history of hair loss in mom or dad.    Medications:  Current Outpatient Prescriptions   Medication Sig Dispense Refill     ketoconazole (NIZORAL) 2 % shampoo Apply and lather in to damp scalp, leave on skin 3-5 minutes and then rinse 3 times weekly. 120 mL 11     clobetasol (TEMOVATE) 0.05 % external solution Apply to affected areas of scalp (above ears) once daily. 50 mL 3     desogestrel-ethinyl estradiol (JOSS, VELIVET) 0.1/0.125/0.15 -0.025 MG per tablet Take 1 tablet by mouth daily       ibuprofen (ADVIL/MOTRIN) 200 MG tablet Take 200 mg by mouth as needed        Allergies   Allergen Reactions     Fexofenadine Other (See Comments)     Bloody nose     Review of Systems:  -Skin: As above in HPI. No additional skin concerns.  -Const. No fevers, chills, night sweats. No fatigue.  -MSK: No joint pain  -HEENT: No mouth sores.     Physical exam:  Vitals: There were no vitals taken for this visit.  GEN: This is a well developed, well-nourished female in no acute distress, in a pleasant mood.    SKIN: Sun-exposed skin, which includes the head/face, neck, both arms, digits, and/or nails was examined.  -Hair pull test to frontal, temporal, parietal, occipital, and vertex scalp is negative.   -No layers of regrowth discernable within injected lena in the right parietal scalp   -Hair on arms  normal  -Eyebrows and eyelashes are normal  -Part width 1.4  -Thinning to crown and frontal scalp  -Temporal thinning.  -Thinning along parietal scalp.   Follicular ostia are intact  - No other lesions of concern on areas examined.     Impression/Plan:  1. Diffuse alopecia of yet-unclear etiology s/p biopsy 1/17/17. Birth control has been long-term (since well before onset of alopecia). Clear androgenetic component. Kenalog injections of equivocal efficacy:    Continue ketoconazole 2% shampoo. Shampoo at least 3-4 times per weeks.    We will defer Kenalog injections until a more definitive diagnosis can be reached     Strongly recommend repeat scalp biopsy at (or before) visit Dr. Huston for more definitive diagnosis.    We will draw preliminary hair loss labs anticipating her visit with Dr. Huston. Draw for CBC with diff, TSH with reflex, ferritin, vitamin D deficiency.     Follow-up in 4 weeks, earlier for new or changing lesions.     Scribe Disclosure:   I, Valente Schaffer, am serving as a scribe to document services personally performed by Dr. Anabella Gacria, based on data collection and the provider's statements to me.

## 2017-06-19 LAB — DEPRECATED CALCIDIOL+CALCIFEROL SERPL-MC: 48 UG/L (ref 20–75)

## 2017-08-17 DIAGNOSIS — R79.0 LOW FERRITIN: ICD-10-CM

## 2017-08-17 LAB — FERRITIN SERPL-MCNC: 28 NG/ML (ref 12–150)

## 2017-09-11 ENCOUNTER — OFFICE VISIT (OUTPATIENT)
Dept: DERMATOLOGY | Facility: CLINIC | Age: 22
End: 2017-09-11

## 2017-09-11 VITALS — HEART RATE: 92 BPM | SYSTOLIC BLOOD PRESSURE: 146 MMHG | DIASTOLIC BLOOD PRESSURE: 93 MMHG

## 2017-09-11 DIAGNOSIS — L65.9 ALOPECIA: ICD-10-CM

## 2017-09-11 DIAGNOSIS — L21.9 DERMATITIS, SEBORRHEIC: ICD-10-CM

## 2017-09-11 DIAGNOSIS — L30.9 DERMATITIS: Primary | ICD-10-CM

## 2017-09-11 DIAGNOSIS — L65.9 LOSS OF HAIR: ICD-10-CM

## 2017-09-11 RX ORDER — CLOBETASOL PROPIONATE 0.05 G/100ML
SHAMPOO TOPICAL
Qty: 1 BOTTLE | Refills: 2 | Status: SHIPPED | OUTPATIENT
Start: 2017-09-11 | End: 2018-11-05

## 2017-09-11 ASSESSMENT — PAIN SCALES - GENERAL: PAINLEVEL: NO PAIN (0)

## 2017-09-11 NOTE — LETTER
"9/11/2017       RE: Daily Vyas  [address] [facility] [address]     Dear Colleague,    Thank you for referring your patient, Daily Vyas, to the [facility] DERMATOLOGY at [facility]. Please see a copy of my visit note below.    [facility] Dermatology Note      Dermatology Problem List:  1.Diffuse alopecia areata s/p scalp biopsy 1/17/17  -s/p kenalog 10 IL injections 4/11/17, 5/19/17  -ketoconazole 2% shampoo, clobetasol 0.05% solution, Rogaine (minoxidil) 5% foam    Encounter Date: Sep 11, 2017    CC:  Chief Complaint   Patient presents with     Hair Loss     Daily notes that she is still loosing hair. \"It's about the same\" since she was last seen.     History of Present Illness:  Ms. Daily Vyas is a 22 year old female who presents in consultation from Dr. Garcia (dermatology) for diffuse alopecia since 8/2016. Previously seen by Dr. Whaley with plan to transfer care to Dr. Huston, Dr. Garcia seen in the interim while awaiting for appointment with Dr. Huston.     Daily first noticed increased hair shedding when brushing her hair or when playing with hair about one year ago (8/2016). She was first seen by her PCP in 11/2016 when an evaluation revealed normal TSH. She was then seen by Dr. Whaley, who first thought Daily might have \"female pattern hair loss\" but a biopsy was interpreted as diffuse alopecia areata vs psoriatic alopecia (the latter of which does not correlate clinically). Per chart review, Dr. Whaley also suspected some role of androgenic alopecia, but this was not a prominent finding pathologically.      Patient started taking biotin in 11/2016, first 5000 mg daily and now more recently 10,000 mcg for the past week. She is also taking iron supplements since late May after labs revealed low iron levels, which have since normalized. Also taking a daily multivitamin.      Since 1/31/17, her current hair " regimen consists of daily showering alternating between ketoconazole 2% shampoo  and head and shoulders shampoo followed by air drying. She does not dye her hair nor use any styling products other than dentangler. Denies clumps of hair falling out while showering. Also applying daily clobetasol solution (since 1/31/17) and minoxidil 5% (since 4/11/17). Denies any scalp symptoms including pain, burning, or itching.      Menses continue to be regular. She has been on the same OCP (Velivet -- Desogestrel-Ethinyl Estradiol) for the past 3-4 years, which was originally started for acne and regulation of her menses. Reports that her menses were more irregular (4-6 week cycles) and heavy prior to her OCP. Also reports taking allegra for seasonal allergies. No other medications.     Denies any triggering stressors prior to her hair loss but wonders if the stress of ongoing hair loss is now contributing to the continued hair loss.     No changes in diet. Follow regular diet, eating dairy daily and meat almost daily.     History of ILK injections x 2 in the past with Dr. Whaley (4/11/17, 5/19/17) without any improvement.      Overall, she feels that her hair loss continues to be an active process and has not stablized. She notices many hairs falling out after bushing or playing with her hair. Also notes many hairs on her pillow. She believes the sides and the back of her head are the most affected.     Past Medical History:   Patient Active Problem List   Diagnosis     Alopecia     Past Medical History:   Diagnosis Date     Eczema      Nickel allergy     identified by allergy to belt buckle     Uncomplicated asthma      No past surgical history on file.    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 The patient denies use of tanning beds.    Family History:  There is no family history of skin cancer.  Maternal grandfather and mother - eczema  Father - eczema and cracking  No family history  of hair loss in mother or father    Medications:  Current Outpatient Prescriptions   Medication Sig Dispense Refill     ketoconazole (NIZORAL) 2 % shampoo Apply and lather in to damp scalp, leave on skin 3-5 minutes and then rinse 3 times weekly. 120 mL 11     clobetasol (TEMOVATE) 0.05 % external solution Apply to affected areas of scalp (above ears) once daily. 50 mL 3     desogestrel-ethinyl estradiol (JOSS, VELIVET) 0.1/0.125/0.15 -0.025 MG per tablet Take 1 tablet by mouth daily       ibuprofen (ADVIL/MOTRIN) 200 MG tablet Take 200 mg by mouth as needed       Allergies   Allergen Reactions     Fexofenadine Other (See Comments)     Bloody nose       Review of Systems:  -Skin Establ Pt: The patient denies any new rash, pruritus, or lesions that are symptomatic, changing or bleeding, except as per HPI.  -Constitutional: The patient denies fevers, chills, unintended weight loss, and night sweats. Patient denies fatigue, however, mother states that patient often appears fatigued for the past year and reports that patient has been taking more naps recently.   -HEENT: Patient denies nonhealing oral sores.  -Skin: As above in HPI. No additional skin concerns.    Physical exam:  Vitals: BP (!) 146/93  Pulse 92  GEN: This is a well developed, well-nourished female in no acute distress, in a pleasant mood.    SKIN: Focused examination of the head and face was performed.  -Hair pull test to frontal scalp is negative  -Regrowth: 1st layer 1-2 cm robust, 2nd layer 5-6 cm, 3rd layer 10-12 cm, 1-2 cm frontal  -Decreased density along crown and frontal scalp but part-width stable at 1.2   -Diffuse adherent scale  -No evidence of prominent perifollicular erythema  -Eyebrows and eyelashes normal  -Forehead with mild comedonal acne  -No other lesions of concern on areas examined.     Labs:  8/17/17 -- Ferritin 28  6/16/17 -- Vitamin D 48, Ferritin 11 (low), TSH 2.21, CBC with diff -- all within normal limits except low ferritin      1/1/17 Biopsy:  A.  Skin, scalp, right parietal, horizontal: Nonscarring alopecia with miniaturization and a pronounced nonanagen shift - (see comment)   B.  Skin, scalp, right parietal, vertical: Nonscarring alopecia - (see comment)     COMMENT:   Both biopsies demonstrate similar features.  Given the near-absence of sebaceous glands in part A, and notably miniaturized sebaceous units in part B, psoriatic alopecia is suspected, though the differential diagnosis also includes alopecia areata.  Clinical correlation is recommended.     Impression/Plan:  1. Diffuse alopecia of unclear etiology s/p biopsy 1/17/17. Patient on OCPs since well before onset of alopecia. No clear stressors or identifiable triggers. Laboratory evaluation including TSH, Vit D, Ferritin, and CBC only notable for low ferritin now improving on iron supplements. Aandrogenic component suggested but  no laboratory evaluation of hormonal markers. Prior treatment with IL kenalog injections x 2 with equivocal efficacy.     Given evidence of seborrhea on exam today, start clobetasol 0.05% shampoo today to replace Head and Shoulders shampoo. Apply to dry scalp and let sit for 10 min before lathering with water and washing off.  Alternate Ketoconazole 2% shampoo and clobetasol 0.05% shampoo. GoodRx coupon provided.     Stop clobetasol 0.05% solution.    Continue with 10,000 mcg daily biotin, daily iron, daily multivitamin, and birth control.    Patient to get labs checked prior to next visit: ferritin, DHEAS, free testosterone.    Will consider utility of laser comb at next visit.    Given little evidence of inflammation on exam and biopsy, no need for ILK for now.      Follow-up in 3 months, earlier for new or changing lesions.     Staff Involved:  This note was scribed by Rocio Tapia, MS4, for Dr. Huston.     I agree with the PFSH and ROS as completed by the Medical Student. The remainder of the encounter was performed by me and scribed by the  Medical Student. The scribed note accurately reflects my personal services and the medical decisions made by me.      Velvet Huston MD  Professor and Chair  Department of Dermatology  [facility]    Pictures were placed in Pt's chart today for future reference.            Again, thank you for allowing me to participate in the care of your patient.      Sincerely,    Velvet Huston MD

## 2017-09-11 NOTE — NURSING NOTE
"Dermatology Rooming Note    Daily Vyas's goals for this visit include:   Chief Complaint   Patient presents with     Hair Loss     Daily notes that she is still loosing hair. \"It's about the same\" since she was last seen.     Annette Couch, [facility]  "

## 2017-09-11 NOTE — MR AVS SNAPSHOT
After Visit Summary   9/11/2017    Daily Vyas    MRN: [id number]           Patient Information     Date Of Birth          1995        Visit Information        Provider Department      9/11/2017 7:30 AM Velvet Huston MD [facility] Dermatology        Today's Diagnoses     Dermatitis    -  1      Care Instructions    1. Start clobex shampoo. Apply to dry scalp and let sit for 10 min, then lather with water and rinse off. Recommend visiting [employer] and searching for clobex (check box for bottle of shampoo) for coupons. Stop using the Head and Shoulder shampoo. Alternate this shampoo (clobex) with the ketoconazole 2% shampoo.   2. Continue using Rogaine 5% daily for the next 2-3 months. Use a double dose (i.e. use a whole cannister in one month). Can apply the whole double dose at once.   3. Stop using the clobetasol 0.05% solution (you are now getting this medication through the clobex shampoo)  4. Continue with 10,000 mg daily biotin, daily iron, daily multivitamin, and birth control.  5. Do not need any more kenalog shots in the scalps as of now.  6. Can consider laser comb in the future (will discuss at the next visit).  7. Get lab work done before your next visit: ferritin (iron), DHEAS, and testosterone.    We look forward to seeing you in December!          Follow-ups after your visit        Your next 10 appointments already scheduled     Dec 18, 2017  8:30 AM CST   (Arrive by 8:15 AM)   RETURN HAIRLOSS with Velvet Huston MD   [facility] Dermatology ([facility])    [address] [place] [id number] [phone]              Who to contact     Please call your clinic at [phone] to:    Ask questions about your health    Make or cancel appointments    Discuss your medicines    Learn about your test results    Speak to your doctor   If you have compliments or concerns about an experience at your clinic, or if  you wish to file a complaint, please contact [facility] Patient Relations at [phone] or email us at [email or web address]         Additional Information About Your Visit        Jovie Information     Jovie gives you secure access to your electronic health record. If you see a primary care provider, you can also send messages to your care team and make appointments. If you have questions, please call your primary care clinic.  If you do not have a primary care provider, please call [phone] and they will assist you.      Jovie is an electronic gateway that provides easy, online access to your medical records. With Jovie, you can request a clinic appointment, read your test results, renew a prescription or communicate with your care team.     To access your existing account, please contact your [facility] or call [phone] for assistance.        Care EveryWhere ID     This is your Care EveryWhere ID. This could be used by other organizations to access your [place] [facility] medical records  [id number]        Your Vitals Were     Pulse                   92            Blood Pressure from Last 3 Encounters:   09/11/17 (!) 146/93    Weight from Last 3 Encounters:   No data found for Wt              Today, you had the following     No orders found for display         Today's Medication Changes          These changes are accurate as of: 9/11/17  9:20 AM.  If you have any questions, ask your nurse or doctor.               These medicines have changed or have updated prescriptions.        Dose/Directions    * clobetasol 0.05 % external solution   Commonly known as:  TEMOVATE   This may have changed:  Another medication with the same name was added. Make sure you understand how and when to take each.   Used for:  AA (alopecia areata), Dermatitis, seborrheic   Changed by:  Fly Whaley MD        Apply to affected areas of scalp (above ears)  once daily.   Quantity:  50 mL   Refills:  3       * clobetasol propionate 0.05 % Sham   Commonly known as:  CLOBEX   This may have changed:  You were already taking a medication with the same name, and this prescription was added. Make sure you understand how and when to take each.   Used for:  Dermatitis   Changed by:  Velvet Huston MD        Apply to a dry scalp, leave on for 10 minutes, then lather and rinse, do every other day, rotating with 2% ketoconazole shampoo   Quantity:  1 Bottle   Refills:  2       * Notice:  This list has 2 medication(s) that are the same as other medications prescribed for you. Read the directions carefully, and ask your doctor or other care provider to review them with you.         Where to get your medicines      Some of these will need a paper prescription and others can be bought over the counter.  Ask your nurse if you have questions.     Bring a paper prescription for each of these medications     clobetasol propionate 0.05 % Sham                Primary Care Provider    Md Other Clinic                Equal Access to Services     STEVO DIAL : Johanna goode Sorachna, waaxda alycia, qaybta kaalmamina hammond, cynthia boyd. So [facility] [phone].    ATENCIÓN: Si habla español, tiene a ruff disposición servicios gratuitos de asistencia lingüística. Llame al [phone].    We comply with applicable federal civil rights laws and [place] laws. We do not discriminate on the basis of race, color, national origin, age, disability sex, sexual orientation or gender identity.            Thank you!     Thank you for choosing [facility] DERMATOLOGY  for your care. Our goal is always to provide you with excellent care. Hearing back from our patients is one way we can continue to improve our services. Please take a few minutes to complete the written survey that you may receive in the mail after your visit with us. Thank you!             Your  Updated Medication List - Protect others around you: Learn how to safely use, store and throw away your medicines at www.disposemymeds.org.          This list is accurate as of: 9/11/17  9:20 AM.  Always use your most recent med list.                   Brand Name Dispense Instructions for use Diagnosis    * clobetasol 0.05 % external solution    TEMOVATE    50 mL    Apply to affected areas of scalp (above ears) once daily.    AA (alopecia areata), Dermatitis, seborrheic       * clobetasol propionate 0.05 % Sham    CLOBEX    1 Bottle    Apply to a dry scalp, leave on for 10 minutes, then lather and rinse, do every other day, rotating with 2% ketoconazole shampoo    Dermatitis       desogestrel-ethinyl estradiol 0.1/0.125/0.15 -0.025 MG per tablet    JOSS, VELIVET     Take 1 tablet by mouth daily        ibuprofen 200 MG tablet    ADVIL/MOTRIN     Take 200 mg by mouth as needed        ketoconazole 2 % shampoo    NIZORAL    120 mL    Apply and lather in to damp scalp, leave on skin 3-5 minutes and then rinse 3 times weekly.    AA (alopecia areata), Dermatitis, seborrheic       * Notice:  This list has 2 medication(s) that are the same as other medications prescribed for you. Read the directions carefully, and ask your doctor or other care provider to review them with you.

## 2017-09-11 NOTE — PROGRESS NOTES
"[facility] Dermatology Note      Dermatology Problem List:  1.Diffuse alopecia areata s/p scalp biopsy 1/17/17  -s/p kenalog 10 IL injections 4/11/17, 5/19/17  -ketoconazole 2% shampoo, clobetasol 0.05% solution, Rogaine (minoxidil) 5% foam    Encounter Date: Sep 11, 2017    CC:  Chief Complaint   Patient presents with     Hair Loss     Daily notes that she is still loosing hair. \"It's about the same\" since she was last seen.     History of Present Illness:  Ms. Daily Vyas is a 22 year old female who presents in consultation from Dr. Garcia (dermatology) for diffuse alopecia since 8/2016. Previously seen by Dr. Whaley with plan to transfer care to Dr. Huston, Dr. Garcia seen in the interim while awaiting for appointment with Dr. Huston.     Daily first noticed increased hair shedding when brushing her hair or when playing with hair about one year ago (8/2016). She was first seen by her PCP in 11/2016 when an evaluation revealed normal TSH. She was then seen by Dr. Whaley, who first thought Daily might have \"female pattern hair loss\" but a biopsy was interpreted as diffuse alopecia areata vs psoriatic alopecia (the latter of which does not correlate clinically). Per chart review, Dr. Whaley also suspected some role of androgenic alopecia, but this was not a prominent finding pathologically.      Patient started taking biotin in 11/2016, first 5000 mg daily and now more recently 10,000 mcg for the past week. She is also taking iron supplements since late May after labs revealed low iron levels, which have since normalized. Also taking a daily multivitamin.      Since 1/31/17, her current hair regimen consists of daily showering alternating between ketoconazole 2% shampoo  and head and shoulders shampoo followed by air drying. She does not dye her hair nor use any styling products other than dentangler. Denies clumps of hair falling out while showering. Also applying daily " clobetasol solution (since 1/31/17) and minoxidil 5% (since 4/11/17). Denies any scalp symptoms including pain, burning, or itching.      Menses continue to be regular. She has been on the same OCP (Velivet -- Desogestrel-Ethinyl Estradiol) for the past 3-4 years, which was originally started for acne and regulation of her menses. Reports that her menses were more irregular (4-6 week cycles) and heavy prior to her OCP. Also reports taking allegra for seasonal allergies. No other medications.     Denies any triggering stressors prior to her hair loss but wonders if the stress of ongoing hair loss is now contributing to the continued hair loss.     No changes in diet. Follow regular diet, eating dairy daily and meat almost daily.     History of ILK injections x 2 in the past with Dr. Whaley (4/11/17, 5/19/17) without any improvement.      Overall, she feels that her hair loss continues to be an active process and has not stablized. She notices many hairs falling out after bushing or playing with her hair. Also notes many hairs on her pillow. She believes the sides and the back of her head are the most affected.     Past Medical History:   Patient Active Problem List   Diagnosis     Alopecia     Past Medical History:   Diagnosis Date     Eczema      Nickel allergy     identified by allergy to belt buckle     Uncomplicated asthma      No past surgical history on file.    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 The patient denies use of tanning beds.    Family History:  There is no family history of skin cancer.  Maternal grandfather and mother - eczema  Father - eczema and cracking  No family history of hair loss in mother or father    Medications:  Current Outpatient Prescriptions   Medication Sig Dispense Refill     ketoconazole (NIZORAL) 2 % shampoo Apply and lather in to damp scalp, leave on skin 3-5 minutes and then rinse 3 times weekly. 120 mL 11     clobetasol (TEMOVATE)  0.05 % external solution Apply to affected areas of scalp (above ears) once daily. 50 mL 3     desogestrel-ethinyl estradiol (JOSS, VELIVET) 0.1/0.125/0.15 -0.025 MG per tablet Take 1 tablet by mouth daily       ibuprofen (ADVIL/MOTRIN) 200 MG tablet Take 200 mg by mouth as needed       Allergies   Allergen Reactions     Fexofenadine Other (See Comments)     Bloody nose       Review of Systems:  -Skin Establ Pt: The patient denies any new rash, pruritus, or lesions that are symptomatic, changing or bleeding, except as per HPI.  -Constitutional: The patient denies fevers, chills, unintended weight loss, and night sweats. Patient denies fatigue, however, mother states that patient often appears fatigued for the past year and reports that patient has been taking more naps recently.   -HEENT: Patient denies nonhealing oral sores.  -Skin: As above in HPI. No additional skin concerns.    Physical exam:  Vitals: BP (!) 146/93  Pulse 92  GEN: This is a well developed, well-nourished female in no acute distress, in a pleasant mood.    SKIN: Focused examination of the head and face was performed.  -Hair pull test to frontal scalp is negative  -Regrowth: 1st layer 1-2 cm robust, 2nd layer 5-6 cm, 3rd layer 10-12 cm, 1-2 cm frontal  -Decreased density along crown and frontal scalp but part-width stable at 1.2   -Diffuse adherent scale  -No evidence of prominent perifollicular erythema  -Eyebrows and eyelashes normal  -Forehead with mild comedonal acne  -No other lesions of concern on areas examined.     Labs:  8/17/17 -- Ferritin 28  6/16/17 -- Vitamin D 48, Ferritin 11 (low), TSH 2.21, CBC with diff -- all within normal limits except low ferritin     1/1/17 Biopsy:  A.  Skin, scalp, right parietal, horizontal: Nonscarring alopecia with miniaturization and a pronounced nonanagen shift - (see comment)   B.  Skin, scalp, right parietal, vertical: Nonscarring alopecia - (see comment)     COMMENT:   Both biopsies demonstrate  similar features.  Given the near-absence of sebaceous glands in part A, and notably miniaturized sebaceous units in part B, psoriatic alopecia is suspected, though the differential diagnosis also includes alopecia areata.  Clinical correlation is recommended.     Impression/Plan:  1. Diffuse alopecia of unclear etiology s/p biopsy 1/17/17. Patient on OCPs since well before onset of alopecia. No clear stressors or identifiable triggers. Laboratory evaluation including TSH, Vit D, Ferritin, and CBC only notable for low ferritin now improving on iron supplements. Aandrogenic component suggested but  no laboratory evaluation of hormonal markers. Prior treatment with IL kenalog injections x 2 with equivocal efficacy.     Given evidence of seborrhea on exam today, start clobetasol 0.05% shampoo today to replace Head and Shoulders shampoo. Apply to dry scalp and let sit for 10 min before lathering with water and washing off.  Alternate Ketoconazole 2% shampoo and clobetasol 0.05% shampoo. GoodRx coupon provided.     Stop clobetasol 0.05% solution.    Continue with 10,000 mcg daily biotin, daily iron, daily multivitamin, and birth control.    Patient to get labs checked prior to next visit: ferritin, DHEAS, free testosterone.    Will consider utility of laser comb at next visit.    Given little evidence of inflammation on exam and biopsy, no need for ILK for now.      Follow-up in 3 months, earlier for new or changing lesions.     Staff Involved:  This note was scribed by Rocio Tapia, MS4, for Dr. Huston.     I agree with the PFSH and ROS as completed by the Medical Student. The remainder of the encounter was performed by me and scribed by the Medical Student. The scribed note accurately reflects my personal services and the medical decisions made by me.      Velvet Huston MD  Professor and Chair  Department of Dermatology  [facility]

## 2017-09-11 NOTE — PATIENT INSTRUCTIONS
1. Start clobex shampoo. Apply to dry scalp and let sit for 10 min, then lather with water and rinse off. Recommend visiting [employer] and searching for clobex (check box for bottle of shampoo) for coupons. Stop using the Head and Shoulder shampoo. Alternate this shampoo (clobex) with the ketoconazole 2% shampoo.   2. Continue using Rogaine 5% daily for the next 2-3 months. Use a double dose (i.e. use a whole cannister in one month). Can apply the whole double dose at once.   3. Stop using the clobetasol 0.05% solution (you are now getting this medication through the clobex shampoo)  4. Continue with 10,000 mg daily biotin, daily iron, daily multivitamin, and birth control.  5. Do not need any more kenalog shots in the scalps as of now.  6. Can consider laser comb in the future (will discuss at the next visit).  7. Get lab work done before your next visit: ferritin (iron), DHEAS, and testosterone.    We look forward to seeing you in December!

## 2017-09-30 PROBLEM — L21.9 DERMATITIS, SEBORRHEIC: Status: ACTIVE | Noted: 2017-09-30

## 2017-09-30 PROBLEM — L65.9 LOSS OF HAIR: Status: ACTIVE | Noted: 2017-09-30

## 2017-12-08 DIAGNOSIS — L65.9 ALOPECIA: ICD-10-CM

## 2017-12-08 LAB
FERRITIN SERPL-MCNC: 35 NG/ML (ref 12–150)
IRON SATN MFR SERPL: 19 % (ref 15–46)
IRON SERPL-MCNC: 66 UG/DL (ref 35–180)
TIBC SERPL-MCNC: 356 UG/DL (ref 240–430)

## 2017-12-11 LAB
DEPRECATED CALCIDIOL+CALCIFEROL SERPL-MC: 45 UG/L (ref 20–75)
DHEA-S SERPL-MCNC: 215 UG/DL (ref 35–430)

## 2017-12-12 LAB
SHBG SERPL-SCNC: 131 NMOL/L (ref 30–135)
TESTOST FREE SERPL-MCNC: 0.11 NG/DL (ref 0.08–0.74)
TESTOST SERPL-MCNC: 23 NG/DL (ref 8–60)

## 2017-12-18 ENCOUNTER — OFFICE VISIT (OUTPATIENT)
Dept: DERMATOLOGY | Facility: CLINIC | Age: 22
End: 2017-12-18
Payer: COMMERCIAL

## 2017-12-18 VITALS — HEART RATE: 96 BPM | DIASTOLIC BLOOD PRESSURE: 79 MMHG | SYSTOLIC BLOOD PRESSURE: 131 MMHG

## 2017-12-18 DIAGNOSIS — L21.9 DERMATITIS, SEBORRHEIC: Primary | ICD-10-CM

## 2017-12-18 DIAGNOSIS — L65.9 LOSS OF HAIR: ICD-10-CM

## 2017-12-18 DIAGNOSIS — R79.0 LOW IRON STORES: ICD-10-CM

## 2017-12-18 ASSESSMENT — PAIN SCALES - GENERAL: PAINLEVEL: NO PAIN (0)

## 2017-12-18 NOTE — LETTER
"12/18/2017       RE: Daily Vyas  [address] [facility] [address]     Dear Colleague,    Thank you for referring your patient, Daily Vyas, to the [facility] DERMATOLOGY at [facility]. Please see a copy of my visit note below.    [facility] Dermatology Note      Dermatology Problem List:  1.Diffuse alopecia areata s/p scalp biopsy 1/17/17  -s/p kenalog 10 IL injections 4/11/17, 5/19/17  -ketoconazole 2% shampoo, clobetasol 0.05% solution, Rogaine (minoxidil) 5% foam  - iron supplement, multivitamin with iron, biotin    Encounter Date: Dec 18, 2017    CC:  Chief Complaint   Patient presents with     Hair Loss     Hair loss follow up- Patient states \"hairloss is about the same\".      History of Present Illness:  Ms. Daily Vyas is a 22 year old female who presents for follow up of hair loss. She was last seen 9/11/17 at which time she was switched to a clobetasol shampoo from the solution. Additionally labs were obtained including ferritin, DHEAS and free testosterone which were within normal limits.      Since her last visit she reports her hair loss is stable. She thought it was better for a brief period but over the past 3 weeks she notes it has been slightly worse which she attributes to stress associated with finals. Since her last visit her current hair regimen consists of daily showering alternating between ketoconazole 2% shampoo  and clobetasol shampoo followed by air drying. Taking biotin supplement, multivitamin. She is using rogaine 5% foam.  She does not dye her hair nor use any styling products other than dentangler. Denies clumps of hair falling out while showering. Denies any scalp symptoms including pain, burning, or itching.      Overall, she feels that her hair loss continues to be an active process. She notices many hairs falling out after bushing or playing with her hair. Also notes many hairs on her pillow. She " believes the sides and the back of her head are the most affected.     Past Medical History:   Patient Active Problem List   Diagnosis     Alopecia     Dermatitis, seborrheic     Loss of hair     Past Medical History:   Diagnosis Date     Eczema      Nickel allergy     identified by allergy to belt buckle     Uncomplicated asthma      History reviewed. No pertinent surgical history.    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 The patient denies use of tanning beds.    Family History:  There is no family history of skin cancer.  Maternal grandfather and mother - eczema  Father - eczema   No family history of hair loss in mother or father    Medications:  Current Outpatient Prescriptions   Medication Sig Dispense Refill     clobetasol propionate (CLOBEX) 0.05 % SHAM Apply to a dry scalp, leave on for 10 minutes, then lather and rinse, do every other day, rotating with 2% ketoconazole shampoo 1 Bottle 2     ketoconazole (NIZORAL) 2 % shampoo Apply and lather in to damp scalp, leave on skin 3-5 minutes and then rinse 3 times weekly. 120 mL 11     desogestrel-ethinyl estradiol (JOSS, VELIVET) 0.1/0.125/0.15 -0.025 MG per tablet Take 1 tablet by mouth daily       ibuprofen (ADVIL/MOTRIN) 200 MG tablet Take 200 mg by mouth as needed       clobetasol (TEMOVATE) 0.05 % external solution Apply to affected areas of scalp (above ears) once daily. (Patient not taking: Reported on 12/18/2017) 50 mL 3     Allergies   Allergen Reactions     Fexofenadine Other (See Comments)     Bloody nose       Review of Systems:  -Skin Establ Pt: The patient denies any new rash, pruritus, or lesions that are symptomatic, changing or bleeding, except as per HPI.  -Constitutional: The patient is otherwise feeling well.   -Skin: As above in HPI. No additional skin concerns.    Physical exam:  Vitals: /79 (Cuff Size: Adult Large)  Pulse 96  GEN: This is a well developed, well-nourished female in no acute  distress, in a pleasant mood.    SKIN: Focused examination of the head and face was performed.  -Hair pull test to frontal scalp is negative  -Regrowth: 1st layer 1-2 cm robust, 2nd layer 4-5 cm, 3rd layer 8-10 cm, 4th layer 12-14 cm  -Decreased density along crown and frontal scalp but part-width stable at 1.2 but tightens up to 1.1 at the vertex scalp  - No appreciable erythema, mild perifollicular scale on the scalp  -No evidence of prominent perifollicular erythema  -Eyebrows and eyelashes normal  -Forehead with mild comedonal acne  -No other lesions of concern on areas examined.     Labs:  12/8/17-- Ferritin 35, DHEAS, iron panel, testosterone WNL  8/17/17 -- Ferritin 28  6/16/17 -- Vitamin D 48, Ferritin 11 (low), TSH 2.21, CBC with diff -- all within normal limits except low ferritin     1/1/17 Biopsy:  A.  Skin, scalp, right parietal, horizontal: Nonscarring alopecia with miniaturization and a pronounced nonanagen shift - (see comment)   B.  Skin, scalp, right parietal, vertical: Nonscarring alopecia - (see comment)     COMMENT:   Both biopsies demonstrate similar features.  Given the near-absence of sebaceous glands in part A, and notably miniaturized sebaceous units in part B, psoriatic alopecia is suspected, though the differential diagnosis also includes alopecia areata.  Clinical correlation is recommended.     Impression/Plan:  1. Diffuse alopecia of unclear etiology s/p biopsy 1/17/17. Patient on OCPs since well before onset of alopecia. No clear stressors or identifiable triggers. Laboratory evaluation including TSH, Vit D, Ferritin, and CBC only notable for low ferritin now improving on iron supplements. Aandrogenic component suggested but  no laboratory evaluation of hormonal markers. Prior treatment with IL kenalog injections x 2 with equivocal efficacy.     Continue clobetasol 0.05% shampoo. Apply to dry scalp and let sit for 10 min before lathering with water and washing off.  Alternate  Ketoconazole 2% shampoo and clobetasol 0.05% shampoo.      Continue with 10,000 mcg daily biotin, daily multivitamin, iron supplement, and birth control.    Will consider utility of laser comb, handout provided today    Given little evidence of inflammation on exam and biopsy, no need for ILK for now.    Plan to recheck iron levels, ferritin at next visit      Follow-up in 3 months, earlier for new or changing lesions.   Dr. Huston staffed the patient.     Staff Involved:  Resident (Jayda Wilhelm), Staff (as above)    Patient was seen and examined with the dermatology resident. I agree with the history, review of systems, physical examination, assessments and plan.    Velvet Huston MD  Professor and  Chair  Department of Dermatology  [facility]    Pictures were placed in Pt's chart today for future reference.              Again, thank you for allowing me to participate in the care of your patient.      Sincerely,    Velvet Huston MD

## 2017-12-18 NOTE — NURSING NOTE
"Dermatology Rooming Note    Daily Vyas's goals for this visit include:   Chief Complaint   Patient presents with     Hair Loss     Hair loss follow up- Patient states \"hairloss is about the same\".      Rima Montanez MA"

## 2017-12-18 NOTE — PROGRESS NOTES
"[facility] Dermatology Note      Dermatology Problem List:  1.Diffuse alopecia areata s/p scalp biopsy 1/17/17  -s/p kenalog 10 IL injections 4/11/17, 5/19/17  -ketoconazole 2% shampoo, clobetasol 0.05% solution, Rogaine (minoxidil) 5% foam  - iron supplement, multivitamin with iron, biotin    Encounter Date: Dec 18, 2017    CC:  Chief Complaint   Patient presents with     Hair Loss     Hair loss follow up- Patient states \"hairloss is about the same\".      History of Present Illness:  Ms. Daily Vyas is a 22 year old female who presents for follow up of hair loss. She was last seen 9/11/17 at which time she was switched to a clobetasol shampoo from the solution. Additionally labs were obtained including ferritin, DHEAS and free testosterone which were within normal limits.      Since her last visit she reports her hair loss is stable. She thought it was better for a brief period but over the past 3 weeks she notes it has been slightly worse which she attributes to stress associated with finals. Since her last visit her current hair regimen consists of daily showering alternating between ketoconazole 2% shampoo  and clobetasol shampoo followed by air drying. Taking biotin supplement, multivitamin. She is using rogaine 5% foam.  She does not dye her hair nor use any styling products other than dentangler. Denies clumps of hair falling out while showering. Denies any scalp symptoms including pain, burning, or itching.      Overall, she feels that her hair loss continues to be an active process. She notices many hairs falling out after bushing or playing with her hair. Also notes many hairs on her pillow. She believes the sides and the back of her head are the most affected.     Past Medical History:   Patient Active Problem List   Diagnosis     Alopecia     Dermatitis, seborrheic     Loss of hair     Past Medical History:   Diagnosis Date     Eczema      Nickel allergy     identified by " allergy to belt buckle     Uncomplicated asthma      History reviewed. No pertinent surgical history.    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 The patient denies use of tanning beds.    Family History:  There is no family history of skin cancer.  Maternal grandfather and mother - eczema  Father - eczema   No family history of hair loss in mother or father    Medications:  Current Outpatient Prescriptions   Medication Sig Dispense Refill     clobetasol propionate (CLOBEX) 0.05 % SHAM Apply to a dry scalp, leave on for 10 minutes, then lather and rinse, do every other day, rotating with 2% ketoconazole shampoo 1 Bottle 2     ketoconazole (NIZORAL) 2 % shampoo Apply and lather in to damp scalp, leave on skin 3-5 minutes and then rinse 3 times weekly. 120 mL 11     desogestrel-ethinyl estradiol (JOSS, VELIVET) 0.1/0.125/0.15 -0.025 MG per tablet Take 1 tablet by mouth daily       ibuprofen (ADVIL/MOTRIN) 200 MG tablet Take 200 mg by mouth as needed       clobetasol (TEMOVATE) 0.05 % external solution Apply to affected areas of scalp (above ears) once daily. (Patient not taking: Reported on 12/18/2017) 50 mL 3     Allergies   Allergen Reactions     Fexofenadine Other (See Comments)     Bloody nose       Review of Systems:  -Skin Establ Pt: The patient denies any new rash, pruritus, or lesions that are symptomatic, changing or bleeding, except as per HPI.  -Constitutional: The patient is otherwise feeling well.   -Skin: As above in HPI. No additional skin concerns.    Physical exam:  Vitals: /79 (Cuff Size: Adult Large)  Pulse 96  GEN: This is a well developed, well-nourished female in no acute distress, in a pleasant mood.    SKIN: Focused examination of the head and face was performed.  -Hair pull test to frontal scalp is negative  -Regrowth: 1st layer 1-2 cm robust, 2nd layer 4-5 cm, 3rd layer 8-10 cm, 4th layer 12-14 cm  -Decreased density along crown and frontal scalp  but part-width stable at 1.2 but tightens up to 1.1 at the vertex scalp  - No appreciable erythema, mild perifollicular scale on the scalp  -No evidence of prominent perifollicular erythema  -Eyebrows and eyelashes normal  -Forehead with mild comedonal acne  -No other lesions of concern on areas examined.     Labs:  12/8/17-- Ferritin 35, DHEAS, iron panel, testosterone WNL  8/17/17 -- Ferritin 28  6/16/17 -- Vitamin D 48, Ferritin 11 (low), TSH 2.21, CBC with diff -- all within normal limits except low ferritin     1/1/17 Biopsy:  A.  Skin, scalp, right parietal, horizontal: Nonscarring alopecia with miniaturization and a pronounced nonanagen shift - (see comment)   B.  Skin, scalp, right parietal, vertical: Nonscarring alopecia - (see comment)     COMMENT:   Both biopsies demonstrate similar features.  Given the near-absence of sebaceous glands in part A, and notably miniaturized sebaceous units in part B, psoriatic alopecia is suspected, though the differential diagnosis also includes alopecia areata.  Clinical correlation is recommended.     Impression/Plan:  1. Diffuse alopecia of unclear etiology s/p biopsy 1/17/17. Patient on OCPs since well before onset of alopecia. No clear stressors or identifiable triggers. Laboratory evaluation including TSH, Vit D, Ferritin, and CBC only notable for low ferritin now improving on iron supplements. Aandrogenic component suggested but  no laboratory evaluation of hormonal markers. Prior treatment with IL kenalog injections x 2 with equivocal efficacy.     Continue clobetasol 0.05% shampoo. Apply to dry scalp and let sit for 10 min before lathering with water and washing off.  Alternate Ketoconazole 2% shampoo and clobetasol 0.05% shampoo.      Continue with 10,000 mcg daily biotin, daily multivitamin, iron supplement, and birth control.    Will consider utility of laser comb, handout provided today    Given little evidence of inflammation on exam and biopsy, no need for  ILK for now.    Plan to recheck iron levels, ferritin at next visit      Follow-up in 3 months, earlier for new or changing lesions.   Dr. Huston staffed the patient.     Staff Involved:  Resident (Jayda Wilhelm), Staff (as above)    Patient was seen and examined with the dermatology resident. I agree with the history, review of systems, physical examination, assessments and plan.    Velvet Huston MD  Professor and  Chair  Department of Dermatology  [facility]

## 2017-12-18 NOTE — MR AVS SNAPSHOT
After Visit Summary   12/18/2017    Daily Vyas    MRN: [id number]           Patient Information     Date Of Birth          1995        Visit Information        Provider Department      12/18/2017 8:30 AM Velvet Huston MD [facility] Dermatology        Today's Diagnoses     Alopecia    -  1    Dermatitis, seborrheic        AA (alopecia areata)          Care Instructions    Continue alternating ketoconazole and clobetasol shampoo. Continue your multivitamin with iron. Continue rogaine 5% foam. Consider low level laser light therapy if interested.     Dr. Huston's Clinic Follow-up:    If we are unable to schedule your appointment today, then you will receive a personal call from our clinic staff to schedule with Dr. Huston prior to your expected return.    Please, contact us with any questions via telephone.  We are not using [employer] to schedule appointments for this clinic at this the present time    How do I call with questions?       [facility]: [phone]       For urgent needs outside of business hours call the [facility] at [phone]        and ask for the dermatology resident on call              Follow-ups after your visit        Follow-up notes from your care team     Return in about 3 months (around 3/18/2018).      Who to contact     Please call your clinic at [phone] to:    Ask questions about your health    Make or cancel appointments    Discuss your medicines    Learn about your test results    Speak to your doctor   If you have compliments or concerns about an experience at your clinic, or if you wish to file a complaint, please contact [facility] Patient Relations at [phone] or email us at [email or web address]         Additional Information About Your Visit        [employer] Information     [employer] gives you secure access to your electronic health record. If you see a  primary care provider, you can also send messages to your care team and make appointments. If you have questions, please call your primary care clinic.  If you do not have a primary care provider, please call [phone] and they will assist you.      [employer] is an electronic gateway that provides easy, online access to your medical records. With [employer], you can request a clinic appointment, read your test results, renew a prescription or communicate with your care team.     To access your existing account, please contact your [facility] or call [phone] for assistance.        Care EveryWhere ID     This is your Care EveryWhere ID. This could be used by other organizations to access your [facility] medical records  [id number]        Your Vitals Were     Pulse                   96            Blood Pressure from Last 3 Encounters:   12/18/17 131/79   09/11/17 (!) 146/93    Weight from Last 3 Encounters:   No data found for Wt              Today, you had the following     No orders found for display       Primary Care Provider    None Specified       No primary provider on file.        Equal Access to Services     [facility] Hospital[facility] : Hadii aad ku hadasho Soomaali, waaxda luqadaha, qaybta kaalmada adeegyada, waxay zoraida paulson . So [facility] [phone].    ATENCIÓN: Si habla español, tiene a ruff disposición servicios gratuitos de asistencia lingüística. Llame al [phone].    We comply with applicable federal civil rights laws and [place] laws. We do not discriminate on the basis of race, color, national origin, age, disability, sex, sexual orientation, or gender identity.            Thank you!     Thank you for choosing [facility] DERMATOLOGY  for your care. Our goal is always to provide you with excellent care. Hearing back from our patients is one way we can continue to improve our services. Please take a few minutes to complete the written survey that you may  receive in the mail after your visit with us. Thank you!             Your Updated Medication List - Protect others around you: Learn how to safely use, store and throw away your medicines at www.disposemymeds.org.          This list is accurate as of: 12/18/17  9:08 AM.  Always use your most recent med list.                   Brand Name Dispense Instructions for use Diagnosis    * clobetasol 0.05 % external solution    TEMOVATE    50 mL    Apply to affected areas of scalp (above ears) once daily.    AA (alopecia areata), Dermatitis, seborrheic       * clobetasol propionate 0.05 % Sham    CLOBEX    1 Bottle    Apply to a dry scalp, leave on for 10 minutes, then lather and rinse, do every other day, rotating with 2% ketoconazole shampoo    Dermatitis       desogestrel-ethinyl estradiol 0.1/0.125/0.15 -0.025 MG per tablet    JOSS, VELIVET     Take 1 tablet by mouth daily        ibuprofen 200 MG tablet    ADVIL/MOTRIN     Take 200 mg by mouth as needed        ketoconazole 2 % shampoo    NIZORAL    120 mL    Apply and lather in to damp scalp, leave on skin 3-5 minutes and then rinse 3 times weekly.    AA (alopecia areata), Dermatitis, seborrheic       * Notice:  This list has 2 medication(s) that are the same as other medications prescribed for you. Read the directions carefully, and ask your doctor or other care provider to review them with you.

## 2017-12-18 NOTE — PATIENT INSTRUCTIONS
Continue alternating ketoconazole and clobetasol shampoo. Continue your multivitamin with iron. Continue rogaine 5% foam. Consider low level laser light therapy if interested.     Dr. Huston's Clinic Follow-up:    If we are unable to schedule your appointment today, then you will receive a personal call from our clinic staff to schedule with Dr. uHston prior to your expected return.    Please, contact us with any questions via telephone.  We are not using [employer] to schedule appointments for this clinic at this the present time    How do I call with questions?       [facility]: [phone]       For urgent needs outside of business hours call the [facility] at [phone]        and ask for the dermatology resident on call

## 2018-01-06 PROBLEM — R79.0 LOW IRON STORES: Status: ACTIVE | Noted: 2018-01-06

## 2018-01-07 ENCOUNTER — HEALTH MAINTENANCE LETTER (OUTPATIENT)
Age: 23
End: 2018-01-07

## 2018-03-06 ENCOUNTER — MYC REFILL (OUTPATIENT)
Dept: DERMATOLOGY | Facility: CLINIC | Age: 23
End: 2018-03-06

## 2018-03-06 DIAGNOSIS — L21.9 DERMATITIS, SEBORRHEIC: ICD-10-CM

## 2018-03-06 DIAGNOSIS — L63.9 AA (ALOPECIA AREATA): ICD-10-CM

## 2018-03-08 RX ORDER — KETOCONAZOLE 20 MG/ML
SHAMPOO TOPICAL
Qty: 120 ML | Refills: 11 | Status: SHIPPED | OUTPATIENT
Start: 2018-03-08 | End: 2019-05-13

## 2018-03-08 NOTE — TELEPHONE ENCOUNTER
Chart reviewed. Refill provided for keto 2% shampoo for seborrheic dermatitis and alopecia areata.     Hernando Christensen MD   PGY-3 Dermatology Resident  Pager [phone]

## 2018-03-08 NOTE — TELEPHONE ENCOUNTER
Last seen 12/18/17: Future appt scheduled for 5/7/18  1. Diffuse alopecia of unclear etiology s/p biopsy 1/17/17. Patient on OCPs since well before onset of alopecia. No clear stressors or identifiable triggers. Laboratory evaluation including TSH, Vit D, Ferritin, and CBC only notable for low ferritin now improving on iron supplements. Aandrogenic component suggested but  no laboratory evaluation of hormonal markers. Prior treatment with IL kenalog injections x 2 with equivocal efficacy.     Continue clobetasol 0.05% shampoo. Apply to dry scalp and let sit for 10 min before lathering with water and washing off.  Alternate Ketoconazole 2% shampoo and clobetasol 0.05% shampoo.      Continue with 10,000 mcg daily biotin, daily multivitamin, iron supplement, and birth control.    Will consider utility of laser comb, handout provided today    Given little evidence of inflammation on exam and biopsy, no need for ILK for now.    Plan to recheck iron levels, ferritin at next visit        Follow-up in 3 months, earlier for new or changing lesions.   Dr. Huston staffed the patient.

## 2018-03-08 NOTE — TELEPHONE ENCOUNTER
Message from MyChart:  Original authorizing provider: MD Daily Zuniga would like a refill of the following medications:  ketoconazole (NIZORAL) 2 % shampoo [Fly Whaley MD]    Preferred pharmacy: [facility] - [place], [place] - [address] [place] [address]    Comment:

## 2018-05-07 ENCOUNTER — OFFICE VISIT (OUTPATIENT)
Dept: DERMATOLOGY | Facility: CLINIC | Age: 23
End: 2018-05-07
Payer: COMMERCIAL

## 2018-05-07 VITALS — DIASTOLIC BLOOD PRESSURE: 79 MMHG | HEART RATE: 83 BPM | SYSTOLIC BLOOD PRESSURE: 134 MMHG

## 2018-05-07 DIAGNOSIS — L65.9 LOSS OF HAIR: Primary | ICD-10-CM

## 2018-05-07 DIAGNOSIS — L65.9 LOSS OF HAIR: ICD-10-CM

## 2018-05-07 DIAGNOSIS — L21.9 DERMATITIS, SEBORRHEIC: ICD-10-CM

## 2018-05-07 DIAGNOSIS — L40.9 PSORIASIS: ICD-10-CM

## 2018-05-07 DIAGNOSIS — R79.0 LOW IRON STORES: ICD-10-CM

## 2018-05-07 LAB
FERRITIN SERPL-MCNC: 16 NG/ML (ref 12–150)
IRON SATN MFR SERPL: 25 % (ref 15–46)
IRON SERPL-MCNC: 93 UG/DL (ref 35–180)
TIBC SERPL-MCNC: 367 UG/DL (ref 240–430)

## 2018-05-07 RX ORDER — TRIAMCINOLONE ACETONIDE 1 MG/G
OINTMENT TOPICAL 2 TIMES DAILY
Qty: 80 G | Refills: 3 | Status: SHIPPED | OUTPATIENT
Start: 2018-05-07

## 2018-05-07 ASSESSMENT — PAIN SCALES - GENERAL
PAINLEVEL: NO PAIN (0)
PAINLEVEL: NO PAIN (0)

## 2018-05-07 NOTE — PROGRESS NOTES
"[facility] Dermatology Note      Dermatology Problem List:  1. Non-scarring alopecia  - Scalp biopsy 1/2017 most consistent with psoriatic alopecia vs. alopecia areata, initially did not have any evidence of psoriasis elsewhere on body but rash on bilateral ears noted 5/7/18 could be consistent with psoriasis (vs contact dermatitis or seborrheic dermatitis). Still no plaques elsewhere, no joint pain, no nail changes; possible family history of psoriasis or eczema in dad and grandpa. A component of androgenetic alopecia is also suspected based on her physical exam findings.   - Labs: Testosterone, DHEA-S, Vitamin D, TSH, CBC, iron panel normal; ferritin initially 11 in 6/2017, improved on repeats.   - ILK 10 mg/cc 4/2017 and 5/2017 without much improvement.  - Current treatment: clobetasol 0.05% shampoo daily 5 days per week and ketoconazole 2% shampoo other 2 days, laser comb TIW, Rogaine 5% foam daily, daily multivitamin, biotin, and iron supplements.  2. Psoriasis vs contact dermatitis vs seborrheic dermatitis, b/l ears (b/l conchal bowl and helix crura, R lobe)  - Triamcinolone ointment BID prn  - Consider patch testing in future if persistent     Encounter Date: May 7, 2018    CC:  Chief Complaint   Patient presents with     Hair Loss     Daily is returning to discuss hair loss r/t alopecia areata . She says things are \"about the same.\"     History of Present Illness:  Ms. Daily Vyas is a 22 year old female who presents for follow up of non-scarring alopecia. She was last seen 12/18/17, at which time her hair loss was fairly stable and she was continued on ketoconazole 2% shampoo and clobetasol 0.05% shampoos alternating daily, Rogaine 5% foam daily, and daily supplements including biotin, multivitamin, and iron, and a laser comb was suggested. In the past, she had also had ILK 10mg/cc to the scalp in 4/2017 and 5/2017 without much improvement.     Today, she reports that she has " "continued on the above regimen and did purchase the laser comb and has been using it three times weekly. She is using the comb to the entire scalp, but only really applying the Rogaine to the frontal area. She believes that overall, her hair loss is stable. She has continued to notice quite a bit of hair shedding whenever she runs her fingers through her hair or brushes it, but she has also noticed evidence of regrowth particularly in the frontal region. She denies any scalp redness, pain, or burning today. She has not noticed any hair loss elsewhere on the body including eyebrows and eyelashes. She denies any nail changes. There have been no changes in her general health since her last visit.     Of note, she does have pink scaly plaques in her ears which she states have been present on and off for years. She has treated this in the past with topical steroids which helps temporarily, but it always recurs. Mildly itchy. Currently she is letting her shampoos get into that area, but is not using any other treatments at this time. There is a family history of an undiagnosed chronic skin rash in her father (hands and feet are scaly and peel) and grandfather (chronic \"dry skin\" rash).     Past Medical History:   Patient Active Problem List   Diagnosis     Alopecia     Dermatitis, seborrheic     Loss of hair     Low iron stores     Past Medical History:   Diagnosis Date     Eczema      Nickel allergy     identified by allergy to belt buckle     Uncomplicated asthma      History reviewed. No pertinent surgical history.    Social History:  The patient recently graduated from [employer] in December 2017. The patient denies use of tanning beds.    Family History:  There is no family history of skin cancer.  Unknown chronic skin rash in father and grandfather.   No family history of hair loss.    Medications:  Current Outpatient Prescriptions   Medication Sig Dispense Refill     clobetasol (TEMOVATE) 0.05 % " external solution Apply to affected areas of scalp (above ears) once daily. (Patient not taking: Reported on 12/18/2017) 50 mL 3     clobetasol propionate (CLOBEX) 0.05 % SHAM Apply to a dry scalp, leave on for 10 minutes, then lather and rinse, do every other day, rotating with 2% ketoconazole shampoo 1 Bottle 2     desogestrel-ethinyl estradiol (JOSS, VELIVET) 0.1/0.125/0.15 -0.025 MG per tablet Take 1 tablet by mouth daily       ibuprofen (ADVIL/MOTRIN) 200 MG tablet Take 200 mg by mouth as needed       ketoconazole (NIZORAL) 2 % shampoo Apply and lather in to damp scalp, leave on skin 3-5 minutes and then rinse 3 times weekly. 120 mL 11     Allergies   Allergen Reactions     Fexofenadine Other (See Comments)     Bloody nose       Review of Systems:  -Skin Establ Pt: The patient denies any new rash, pruritus, or lesions that are symptomatic, changing or bleeding, except as per HPI.  -Constitutional: The patient is otherwise feeling well.   -Skin: As above in HPI. No additional skin concerns.    Physical exam:  Vitals: /79 (BP Location: Right arm, Patient Position: Chair, Cuff Size: Adult Large)  Pulse 83  GEN: This is a well developed, well-nourished female in no acute distress, in a pleasant mood.    SKIN: Focused examination of the scalp, face, and neck performed:  - Mild patchy erythema scattered throughout scalp.   - Significant improvement in hair density on frontal and side view photographs as compared to those taken 12/18/17. The majority of her thinning today is located at the vertex scalp - there are no photos of this area from last visit to compare to, however although her frontal central part has tightened to a 1.1 on the Hammad scale (from 1.2), her vertex central scalp was widened to 1.3 (from 1.1). Layers of regrowth at vertex: 1st layer 1cm and robust, 2nd layer 3-4cm, 3rd layer 6-7cm, 4th layer 8-10 cm. Layers of regrowth at frontal scalp: 1st layer 1cm and robust, 2nd layer 4cm and  robust, 3rd layer 8cm and robust, 4th layer 12-14 cm.   - Bilateral ears (b/l conchal bowls and crura of helices, plus right earlobe) with fairly well demarcated bright pink plaques with fine silver scaling.   - Normal eyebrows and eyelashes.   - No nail findings.     Labs:  12/8/17-- Ferritin 35, DHEAS, iron panel, testosterone WNL  8/17/17 -- Ferritin 28  6/16/17 -- Vitamin D 48, Ferritin 11 (low), TSH 2.21, CBC with diff -- all within normal limits except low ferritin     1/1/17 Biopsy:  A.  Skin, scalp, right parietal, horizontal: Nonscarring alopecia with miniaturization and a pronounced nonanagen shift - (see comment)   B.  Skin, scalp, right parietal, vertical: Nonscarring alopecia - (see comment)   COMMENT:   Both biopsies demonstrate similar features.  Given the near-absence of sebaceous glands in part A, and notably miniaturized sebaceous units in part B, psoriatic alopecia is suspected, though the differential diagnosis also includes alopecia areata.  Clinical correlation is recommended.     Impression/Plan:  1. Chronic non-scarring alopecia - density is significantly improved in the frontal and temporal regions, however slightly worsened in the vertex region. Vertex layers of regrowth are lagging behind the frontal region as well.   - Scalp biopsy 1/2017 was most consistent with psoriatic alopecia vs. alopecia areata, initially did not have any evidence of psoriasis elsewhere on body but rash on bilateral ears noted today could be consistent with psoriasis (vs contact dermatitis or seborrhea). Still no psoriatic plaques elsewhere, no joint pain, no nail changes; possible family history of psoriasis or eczema in dad and grandpa.  A component of androgenetic alopecia is also suspected based on her physical exam findings.   - Labs previously remarkable only for low ferritin of 11 in 6/2017, which has improved on repeat values. Recheck iron panel and ferritin today. Continue iron supplement for now, along  with biotin and multivitamin.   - Prior ILK without much improvement in 4-5/2017.  - Continue clobetasol and ketoconazole shampoos, but recommend increasing use of clobetasol shampoo to daily 5 days per week, and ketoconazole shampoo daily on the other 2 days of the week (for more aggressive anti-inflammatory effect).   - Continue Rogaine 5% foam daily; counseled to apply to the entire scalp including the vertex area for improved effect.  - Continue laser comb TIW to entire scalp, including vertex.     2. Bilateral ear rash - psoriasis vs contact dermatitis vs seborrheic dermatitis  - Start triamcinolone ointment BID for 2-3 weeks until improved, then use sparingly for maintenance; repeat as needed prn recurrence.   - If this becomes persistent, consider patch testing to evaluate for allergic contact dermatitis.     Follow-up in 3 months, earlier for concerns.     Dr. Huston staffed the patient.     Staff Involved:  Resident (Mary Bhandari), Staff (as above)    Juliette Bhandari MD  Dermatology Resident PGY2    Patient was seen and examined with the dermatology resident. I agree with the history, review of systems, physical examination, assessments and plan.    Velvet Huston MD  Professor and  Chair  Department of Dermatology  [facility]

## 2018-05-07 NOTE — NURSING NOTE
"Dermatology Rooming Note    Daily Vyas's goals for this visit include:   Chief Complaint   Patient presents with     Hair Loss     Daily is returning to discuss hair loss r/t alopecia areata . She says things are \"about the same.\"     Jackie Villarreal, CHRISTEN    "

## 2018-05-07 NOTE — MR AVS SNAPSHOT
After Visit Summary   5/7/2018    Daily Vyas    MRN: [id number]           Patient Information     Date Of Birth          1995        Visit Information        Provider Department      5/7/2018 7:30 AM Velvet Huston MD [facility]        Today's Diagnoses     Loss of hair    -  1    Psoriasis          Care Instructions    Use triamcinolone ointment to the rash on the ears twice daily for 2-3 weeks, then a few times weekly for maintenance.     Increase clobetasol shampoo to 4-5 times weekly, and use the ketoconazole on the other days of the week.     Continue Rogaine and supplements.     Labs today to check on your iron stores.     Return 3 months.           Follow-ups after your visit        Follow-up notes from your care team     Return in about 3 months (around 8/7/2018).      Your next 10 appointments already scheduled     May 07, 2018  8:45 AM CDT   LAB with  LAB   [facility] ([facility])    [address] [place] [id number] [phone]           Please do not eat 10-12 hours before your appointment if you are coming in fasting for labs on lipids, cholesterol, or glucose (sugar). This does not apply to pregnant women. Water, hot tea and black coffee (with nothing added) are okay. Do not drink other fluids, diet soda or chew gum.            Aug 13, 2018  3:50 PM CDT   (Arrive by 3:35 PM)   RETURN HAIRLOSS with Velvet Huston MD   [facility] ([facility])    [address] [place] [id number] [phone]              Future tests that were ordered for you today     Open Future Orders        Priority Expected Expires Ordered    Iron and iron binding capacity Routine  5/7/2019 5/7/2018    Ferritin Routine  5/7/2019 5/7/2018            Who to contact     Please call your clinic at [phone] to:    Ask questions about your health    Make or  cancel appointments    Discuss your medicines    Learn about your test results    Speak to your doctor            Additional Information About Your Visit        [employer] Information     [employer] gives you secure access to your electronic health record. If you see a primary care provider, you can also send messages to your care team and make appointments. If you have questions, please call your primary care clinic.  If you do not have a primary care provider, please call [phone] and they will assist you.      [employer] is an electronic gateway that provides easy, online access to your medical records. With [employer], you can request a clinic appointment, read your test results, renew a prescription or communicate with your care team.     To access your existing account, please contact your [facility] or call [phone] for assistance.        Care EveryWhere ID     This is your Care EveryWhere ID. This could be used by other organizations to access your Mobile medical records  [id number]        Your Vitals Were     Pulse                   83            Blood Pressure from Last 3 Encounters:   05/07/18 134/79   12/18/17 131/79   09/11/17 (!) 146/93    Weight from Last 3 Encounters:   No data found for Wt                 Today's Medication Changes          These changes are accurate as of 5/7/18  8:44 AM.  If you have any questions, ask your nurse or doctor.               Start taking these medicines.        Dose/Directions    triamcinolone 0.1 % ointment   Commonly known as:  KENALOG   Used for:  Psoriasis   Started by:  Velvet Huston MD        Apply topically 2 times daily To ears for 2-3 weeks, then a few times per week for maintenance.   Quantity:  80 g   Refills:  3            Where to get your medicines      These medications were sent to [facility] [address] [place], [place] - [address]  [address] [facility] [facility] [address]     Phone:   [phone]     triamcinolone 0.1 % ointment                Primary Care Provider    None Specified       No primary provider on file.        Equal Access to Services     STEVO DIAL : Hadii aad ku hadlindsay Kim, robb michellemercedes, francine hammond, cynthia dominguez So [facility] [phone].    ATENCIÓN: Si habla español, tiene a ruff disposición servicios gratuitos de asistencia lingüística. Llame al [phone].    We comply with applicable federal civil rights laws and [place] laws. We do not discriminate on the basis of race, color, national origin, age, disability, sex, sexual orientation, or gender identity.            Thank you!     Thank you for choosing [facility]  for your care. Our goal is always to provide you with excellent care. Hearing back from our patients is one way we can continue to improve our services. Please take a few minutes to complete the written survey that you may receive in the mail after your visit with us. Thank you!             Your Updated Medication List - Protect others around you: Learn how to safely use, store and throw away your medicines at www.disposemymeds.org.          This list is accurate as of 5/7/18  8:44 AM.  Always use your most recent med list.                   Brand Name Dispense Instructions for use Diagnosis    * clobetasol 0.05 % external solution    TEMOVATE    50 mL    Apply to affected areas of scalp (above ears) once daily.    AA (alopecia areata), Dermatitis, seborrheic       * clobetasol propionate 0.05 % Sham    CLOBEX    1 Bottle    Apply to a dry scalp, leave on for 10 minutes, then lather and rinse, do every other day, rotating with 2% ketoconazole shampoo    Dermatitis       desogestrel-ethinyl estradiol 0.1/0.125/0.15 -0.025 MG per tablet    JOSS, VELIVET     Take 1 tablet by mouth daily        ibuprofen 200 MG tablet    ADVIL/MOTRIN     Take 200 mg by mouth as needed        ketoconazole 2 % shampoo     NIZORAL    120 mL    Apply and lather in to damp scalp, leave on skin 3-5 minutes and then rinse 3 times weekly.    AA (alopecia areata), Dermatitis, seborrheic       triamcinolone 0.1 % ointment    KENALOG    80 g    Apply topically 2 times daily To ears for 2-3 weeks, then a few times per week for maintenance.    Psoriasis       * Notice:  This list has 2 medication(s) that are the same as other medications prescribed for you. Read the directions carefully, and ask your doctor or other care provider to review them with you.

## 2018-05-07 NOTE — PATIENT INSTRUCTIONS
Use triamcinolone ointment to the rash on the ears twice daily for 2-3 weeks, then a few times weekly for maintenance.     Increase clobetasol shampoo to 4-5 times weekly, and use the ketoconazole on the other days of the week.     Continue Rogaine, laser comb, and supplements.     Labs today to check on your iron stores.     Return 3 months.

## 2018-05-07 NOTE — LETTER
"5/7/2018       RE: Daily Vyas  [address]  [place] [address]     Dear Colleague,    Thank you for referring your patient, Daily Vyas, to the [facility] at [facility]. Please see a copy of my visit note below.    [facility] Derm[facility] Note      Dermatology Problem List:  1. Non-scarring alopecia  - Scalp biopsy 1/2017 most consistent with psoriatic alopecia vs. alopecia areata, initially did not have any evidence of psoriasis elsewhere on body but rash on bilateral ears noted 5/7/18 could be consistent with psoriasis (vs contact dermatitis or seborrheic dermatitis). Still no plaques elsewhere, no joint pain, no nail changes; possible family history of psoriasis or eczema in dad and grandpa. A component of androgenetic alopecia is also suspected based on her physical exam findings.   - Labs: Testosterone, DHEA-S, Vitamin D, TSH, CBC, iron panel normal; ferritin initially 11 in 6/2017, improved on repeats.   - ILK 10 mg/cc 4/2017 and 5/2017 without much improvement.  - Current treatment: clobetasol 0.05% shampoo daily 5 days per week and ketoconazole 2% shampoo other 2 days, laser comb TIW, Rogaine 5% foam daily, daily multivitamin, biotin, and iron supplements.  2. Psoriasis vs contact dermatitis vs seborrheic dermatitis, b/l ears (b/l conchal bowl and helix crura, R lobe)  - Triamcinolone ointment BID prn  - Consider patch testing in future if persistent     Encounter Date: May 7, 2018    CC:  Chief Complaint   Patient presents with     Hair Loss     Daily is returning to discuss hair loss r/t alopecia areata . She says things are \"about the same.\"     History of Present Illness:  Ms. Daily Vyas is a 22 year old female who presents for follow up of non-scarring alopecia. She was last seen 12/18/17, at which time her hair loss was fairly stable and she was continued on ketoconazole 2% shampoo and clobetasol " "0.05% shampoos alternating daily, Rogaine 5% foam daily, and daily supplements including biotin, multivitamin, and iron, and a laser comb was suggested. In the past, she had also had ILK 10mg/cc to the scalp in 4/2017 and 5/2017 without much improvement.   Today, she reports that she has continued on the above regimen and did purchase the laser comb and has been using it three times weekly. She is using the comb to the entire scalp, but only really applying the Rogaine to the frontal area. She believes that overall, her hair loss is stable. She has continued to notice quite a bit of hair shedding whenever she runs her fingers through her hair or brushes it, but she has also noticed evidence of regrowth particularly in the frontal region. She denies any scalp redness, pain, or burning today. She has not noticed any hair loss elsewhere on the body including eyebrows and eyelashes. She denies any nail changes. There have been no changes in her general health since her last visit.   Of note, she does have pink scaly plaques in her ears which she states have been present on and off for years. She has treated this in the past with topical steroids which helps temporarily, but it always recurs. Mildly itchy. Currently she is letting her shampoos get into that area, but is not using any other treatments at this time. There is a family history of an undiagnosed chronic skin rash in her father (hands and feet are scaly and peel) and grandfather (chronic \"dry skin\" rash).     Past Medical History:   Patient Active Problem List   Diagnosis     Alopecia     Dermatitis, seborrheic     Loss of hair     Low iron stores     Past Medical History:   Diagnosis Date     Eczema      Nickel allergy     identified by allergy to belt buckle     Uncomplicated asthma      History reviewed. No pertinent surgical history.    Social History:  The patient recently graduated from [employer] in December 2017. The patient denies use of " tanning beds.    Family History:  There is no family history of skin cancer.  Unknown chronic skin rash in father and grandfather.   No family history of hair loss.    Medications:  Current Outpatient Prescriptions   Medication Sig Dispense Refill     clobetasol (TEMOVATE) 0.05 % external solution Apply to affected areas of scalp (above ears) once daily. (Patient not taking: Reported on 12/18/2017) 50 mL 3     clobetasol propionate (CLOBEX) 0.05 % SHAM Apply to a dry scalp, leave on for 10 minutes, then lather and rinse, do every other day, rotating with 2% ketoconazole shampoo 1 Bottle 2     desogestrel-ethinyl estradiol (JOSS, VELIVET) 0.1/0.125/0.15 -0.025 MG per tablet Take 1 tablet by mouth daily       ibuprofen (ADVIL/MOTRIN) 200 MG tablet Take 200 mg by mouth as needed       ketoconazole (NIZORAL) 2 % shampoo Apply and lather in to damp scalp, leave on skin 3-5 minutes and then rinse 3 times weekly. 120 mL 11     Allergies   Allergen Reactions     Fexofenadine Other (See Comments)     Bloody nose       Review of Systems:  -Skin Establ Pt: The patient denies any new rash, pruritus, or lesions that are symptomatic, changing or bleeding, except as per HPI.  -Constitutional: The patient is otherwise feeling well.   -Skin: As above in HPI. No additional skin concerns.    Physical exam:  Vitals: /79 (BP Location: Right arm, Patient Position: Chair, Cuff Size: Adult Large)  Pulse 83  GEN: This is a well developed, well-nourished female in no acute distress, in a pleasant mood.    SKIN: Focused examination of the scalp, face, and neck performed:  - Mild patchy erythema scattered throughout scalp.   - Significant improvement in hair density on frontal and side view photographs as compared to those taken 12/18/17. The majority of her thinning today is located at the vertex scalp - there are no photos of this area from last visit to compare to, however although her frontal central part has tightened to a 1.1 on  the Hammad scale (from 1.2), her vertex central scalp was widened to 1.3 (from 1.1). Layers of regrowth at vertex: 1st layer 1cm and robust, 2nd layer 3-4cm, 3rd layer 6-7cm, 4th layer 8-10 cm. Layers of regrowth at frontal scalp: 1st layer 1cm and robust, 2nd layer 4cm and robust, 3rd layer 8cm and robust, 4th layer 12-14 cm.   - Bilateral ears (b/l conchal bowls and crura of helices, plus right earlobe) with fairly well demarcated bright pink plaques with fine silver scaling.   - Normal eyebrows and eyelashes.   - No nail findings.     Labs:  12/8/17-- Ferritin 35, DHEAS, iron panel, testosterone WNL  8/17/17 -- Ferritin 28  6/16/17 -- Vitamin D 48, Ferritin 11 (low), TSH 2.21, CBC with diff -- all within normal limits except low ferritin     1/1/17 Biopsy:  A.  Skin, scalp, right parietal, horizontal: Nonscarring alopecia with miniaturization and a pronounced nonanagen shift - (see comment)   B.  Skin, scalp, right parietal, vertical: Nonscarring alopecia - (see comment)   COMMENT:   Both biopsies demonstrate similar features.  Given the near-absence of sebaceous glands in part A, and notably miniaturized sebaceous units in part B, psoriatic alopecia is suspected, though the differential diagnosis also includes alopecia areata.  Clinical correlation is recommended.     Impression/Plan:  1. Chronic non-scarring alopecia - density is significantly improved in the frontal and temporal regions, however slightly worsened in the vertex region. Vertex layers of regrowth are lagging behind the frontal region as well.   - Scalp biopsy 1/2017 was most consistent with psoriatic alopecia vs. alopecia areata, initially did not have any evidence of psoriasis elsewhere on body but rash on bilateral ears noted today could be consistent with psoriasis (vs contact dermatitis or seborrhea). Still no psoriatic plaques elsewhere, no joint pain, no nail changes; possible family history of psoriasis or eczema in dad and grandpa.  A  component of androgenetic alopecia is also suspected based on her physical exam findings.   - Labs previously remarkable only for low ferritin of 11 in 6/2017, which has improved on repeat values. Recheck iron panel and ferritin today. Continue iron supplement for now, along with biotin and multivitamin.   - Prior ILK without much improvement in 4-5/2017.  - Continue clobetasol and ketoconazole shampoos, but recommend increasing use of clobetasol shampoo to daily 5 days per week, and ketoconazole shampoo daily on the other 2 days of the week (for more aggressive anti-inflammatory effect).   - Continue Rogaine 5% foam daily; counseled to apply to the entire scalp including the vertex area for improved effect.  - Continue laser comb TIW to entire scalp, including vertex.     2. Bilateral ear rash - psoriasis vs contact dermatitis vs seborrheic dermatitis  - Start triamcinolone ointment BID for 2-3 weeks until improved, then use sparingly for maintenance; repeat as needed prn recurrence.   - If this becomes persistent, consider patch testing to evaluate for allergic contact dermatitis.     Follow-up in 3 months, earlier for concerns.     Dr. Huston staffed the patient.     Staff Involved:  Resident (Mary Bhandari), Staff (as above)    Juliette Bhandari MD  Dermatology Resident PGY2    Pictures were placed in Pt's chart today for future reference.              Again, thank you for allowing me to participate in the care of your patient.      Sincerely,    Velvet Huston MD

## 2018-08-13 ENCOUNTER — OFFICE VISIT (OUTPATIENT)
Dept: DERMATOLOGY | Facility: CLINIC | Age: 23
End: 2018-08-13
Payer: COMMERCIAL

## 2018-08-13 VITALS — SYSTOLIC BLOOD PRESSURE: 127 MMHG | DIASTOLIC BLOOD PRESSURE: 87 MMHG | HEART RATE: 105 BPM

## 2018-08-13 DIAGNOSIS — R79.0 LOW IRON STORES: Primary | ICD-10-CM

## 2018-08-13 DIAGNOSIS — L65.9 LOSS OF HAIR: ICD-10-CM

## 2018-08-13 DIAGNOSIS — R79.0 LOW IRON STORES: ICD-10-CM

## 2018-08-13 DIAGNOSIS — L21.9 DERMATITIS, SEBORRHEIC: ICD-10-CM

## 2018-08-13 LAB
FERRITIN SERPL-MCNC: 24 NG/ML (ref 12–150)
IRON SATN MFR SERPL: 29 % (ref 15–46)
IRON SERPL-MCNC: 102 UG/DL (ref 35–180)
TIBC SERPL-MCNC: 356 UG/DL (ref 240–430)

## 2018-08-13 ASSESSMENT — PAIN SCALES - GENERAL: PAINLEVEL: NO PAIN (0)

## 2018-08-13 NOTE — LETTER
"8/13/2018       RE: Daily Vyas  [address]  [place] [address]     Dear Colleague,    Thank you for referring your patient, Daily Vyas, to the [facility]ER[facility] at [facility]. Please see a copy of my visit note below.    [facility] Dermatology Note      Dermatology Problem List:  1. Non-scarring alopecia  - Scalp biopsy 1/2017 most consistent with psoriatic alopecia vs. alopecia areata, initially did not have any evidence of psoriasis elsewhere on body but rash on bilateral ears noted 5/7/18 could be consistent with psoriasis (vs contact dermatitis or seborrheic dermatitis). Still no plaques elsewhere, no joint pain, no nail changes; possible family history of psoriasis or eczema in dad and grandpa. A component of androgenetic alopecia is also suspected based on her physical exam findings.   - Labs: Testosterone, DHEA-S, Vitamin D, TSH, CBC, iron panel normal; ferritin initially 11 in 6/2017, improved on repeats.   - ILK 10 mg/cc 4/2017 and 5/2017 without much improvement.  - Current treatment: alternate clobetasol 0.05% shampoo and ketoconazole 2% shampoo daily, laser comb TIW, Rogaine 5% foam daily, daily multivitamin, biotin, and iron supplements.    2. Psoriasis vs contact dermatitis vs seborrheic dermatitis, b/l ears (b/l conchal bowl and helix crura, R lobe)  - Triamcinolone ointment BID prn  - Consider patch testing in future if persistent     Encounter Date: Aug 13, 2018    CC:  Chief Complaint   Patient presents with     Hair Loss     Daily is here today for a hair loss follow up- Daily states \"I think there is more hair shedding than there should be\".          History of Present Illness:  Ms. Daily Vyas is a 23 year old female who presents as a follow-up for non-scarring alopecia (psoriatic alopecia vs. alopecia areata). The patient was last seen 5/7/18 when the frontal and temporal areas had improved " but the vertex region had progressed. Since then, she has been washing her hair Mon-Fri with clobetasol shampoo, and with ketoconazole shampoo on the weekends. She has used Rogaine daily to her entire scalp, a laser comb three times weekly, and is taking iron, biotin, and a multivitamin daily. She denies any pain, burn, itch, or numbness of the scalp. She is otherwise feeling well today and has had no changes in medical history since she was last seen. Her ferritin level was 16 in May 2018, so she was advised to take 324mg iron daily, which she has done, and has tolerated decently with some mild stomach cramping.    She has noticed increased shedding onto her comb, about 15-20 strands per side, multiple times per day.     Her ear rash has cleared with use of triamcinolone.     Past Medical History:   Patient Active Problem List   Diagnosis     Alopecia     Dermatitis, seborrheic     Loss of hair     Low iron stores     Past Medical History:   Diagnosis Date     Eczema      Nickel allergy     identified by allergy to belt buckle     Uncomplicated asthma      History reviewed. No pertinent surgical history.    Social History:  Social History     Social History     Marital status: Single     Spouse name: N/A     Number of children: N/A     Years of education: N/A     Occupational History     Not on file.     Social History Main Topics     Smoking status: Never Smoker     Smokeless tobacco: Never Used     Alcohol use Not on file     Drug use: Not on file     Sexual activity: Not on file     Other Topics Concern     Not on file     Social History Narrative       Family History:  Family History   Problem Relation Age of Onset     Melanoma No family hx of      Skin Cancer No family hx of        Medications:  Current Outpatient Prescriptions   Medication Sig Dispense Refill     clobetasol propionate (CLOBEX) 0.05 % SHAM Apply to a dry scalp, leave on for 10 minutes, then lather and rinse, do every other day, rotating with  2% ketoconazole shampoo 1 Bottle 2     desogestrel-ethinyl estradiol (JOSS, VELIVET) 0.1/0.125/0.15 -0.025 MG per tablet Take 1 tablet by mouth daily       ibuprofen (ADVIL/MOTRIN) 200 MG tablet Take 200 mg by mouth as needed       ketoconazole (NIZORAL) 2 % shampoo Apply and lather in to damp scalp, leave on skin 3-5 minutes and then rinse 3 times weekly. 120 mL 11     triamcinolone (KENALOG) 0.1 % ointment Apply topically 2 times daily To ears for 2-3 weeks, then a few times per week for maintenance. 80 g 3     clobetasol (TEMOVATE) 0.05 % external solution Apply to affected areas of scalp (above ears) once daily. (Patient not taking: Reported on 12/18/2017) 50 mL 3     Allergies   Allergen Reactions     Fexofenadine Other (See Comments)     Bloody nose         Review of Systems:  -As per HPI  -Constitutional: The patient denies fatigue, fevers, chills, unintended weight loss, and night sweats.  -HEENT: Patient denies nonhealing oral sores.  -Skin: As above in HPI. No additional skin concerns.    Physical exam:  Vitals: /87 (Cuff Size: Adult Regular)  Pulse 105  GEN: This is a well developed, well-nourished female in no acute distress, in a pleasant mood.    SKIN: Focused examination of the scalp and ears was performed.  -Robust layer 1-2, layers at 4-5, 8-10.  -Mild folliculitis and oil present at scalp. No erythema.  -Positive pull test in L frontal area. Negative pull test R frontal area.   -No other lesions of concern on areas examined.   -Ears: no pinkness, no scale, venous prominence noted (similar to previous photos)    Impression/Plan:  1. Nonscarring alopecia  - multifactorial  - improved growth in vertex scalp. Stable frontal hair line. Robust regrowth layer of 1-2 cm present throughout median part and in posterior vertex. Mild folliculitis but no erythema present.    Discussed that patient will have iron and ferritin labs redrawn. If ferritin low, will be referred to see a hematologist.      Discussed altering shampoo regimen so that patient shampoos daily, alternating use of ketoconazole and clobetasol shampoo.    Continue with Rogaine 5% foam daily    Continue with laser comb three times weekly    Continue with daily iron (324 mg), multivitamin, biotin    2. Bilateral ear rash - psoriasis vs contact dermatitis vs seborrheic dermatitis - resolved    Continue using triamcinolone ointment PRN for flare ups    Follow-up in 6 months, earlier for new or changing lesions.     Staff Involved:  Scribed by SEBAS Ogden for Dr. Velvet Huston.        Staff Physician:  I was present with the medical student who participated in the service and in the documentation of the note. I have verified the history and personally performed the physical exam and medical decision making. I agree with the assessment and plan of care as documented in the note.     Velvet Huston MD  Professor and Chair  Department of Dermatology  [facility]: Phone: [phone], Fax:[phone]  [facility]: Phone: [phone], Fax: [phone]  8/26/2018

## 2018-08-13 NOTE — PROGRESS NOTES
"[facility] Dermatology Note      Dermatology Problem List:  1. Non-scarring alopecia  - Scalp biopsy 1/2017 most consistent with psoriatic alopecia vs. alopecia areata, initially did not have any evidence of psoriasis elsewhere on body but rash on bilateral ears noted 5/7/18 could be consistent with psoriasis (vs contact dermatitis or seborrheic dermatitis). Still no plaques elsewhere, no joint pain, no nail changes; possible family history of psoriasis or eczema in dad and grandpa. A component of androgenetic alopecia is also suspected based on her physical exam findings.   - Labs: Testosterone, DHEA-S, Vitamin D, TSH, CBC, iron panel normal; ferritin initially 11 in 6/2017, improved on repeats.   - ILK 10 mg/cc 4/2017 and 5/2017 without much improvement.  - Current treatment: alternate clobetasol 0.05% shampoo and ketoconazole 2% shampoo daily, laser comb TIW, Rogaine 5% foam daily, daily multivitamin, biotin, and iron supplements.    2. Psoriasis vs contact dermatitis vs seborrheic dermatitis, b/l ears (b/l conchal bowl and helix crura, R lobe)  - Triamcinolone ointment BID prn  - Consider patch testing in future if persistent     Encounter Date: Aug 13, 2018    CC:  Chief Complaint   Patient presents with     Hair Loss     Daily is here today for a hair loss follow up- Daily states \"I think there is more hair shedding than there should be\".          History of Present Illness:  Ms. Daily Vyas is a 23 year old female who presents as a follow-up for non-scarring alopecia (psoriatic alopecia vs. alopecia areata). The patient was last seen 5/7/18 when the frontal and temporal areas had improved but the vertex region had progressed. Since then, she has been washing her hair Mon-Fri with clobetasol shampoo, and with ketoconazole shampoo on the weekends. She has used Rogaine daily to her entire scalp, a laser comb three times weekly, and is taking iron, biotin, and a multivitamin " daily. She denies any pain, burn, itch, or numbness of the scalp. She is otherwise feeling well today and has had no changes in medical history since she was last seen. Her ferritin level was 16 in May 2018, so she was advised to take 324mg iron daily, which she has done, and has tolerated decently with some mild stomach cramping.    She has noticed increased shedding onto her comb, about 15-20 strands per side, multiple times per day.     Her ear rash has cleared with use of triamcinolone.     Past Medical History:   Patient Active Problem List   Diagnosis     Alopecia     Dermatitis, seborrheic     Loss of hair     Low iron stores     Past Medical History:   Diagnosis Date     Eczema      Nickel allergy     identified by allergy to belt buckle     Uncomplicated asthma      History reviewed. No pertinent surgical history.    Social History:  Social History     Social History     Marital status: Single     Spouse name: N/A     Number of children: N/A     Years of education: N/A     Occupational History     Not on file.     Social History Main Topics     Smoking status: Never Smoker     Smokeless tobacco: Never Used     Alcohol use Not on file     Drug use: Not on file     Sexual activity: Not on file     Other Topics Concern     Not on file     Social History Narrative       Family History:  Family History   Problem Relation Age of Onset     Melanoma No family hx of      Skin Cancer No family hx of        Medications:  Current Outpatient Prescriptions   Medication Sig Dispense Refill     clobetasol propionate (CLOBEX) 0.05 % SHAM Apply to a dry scalp, leave on for 10 minutes, then lather and rinse, do every other day, rotating with 2% ketoconazole shampoo 1 Bottle 2     desogestrel-ethinyl estradiol (JOSS, VELIVET) 0.1/0.125/0.15 -0.025 MG per tablet Take 1 tablet by mouth daily       ibuprofen (ADVIL/MOTRIN) 200 MG tablet Take 200 mg by mouth as needed       ketoconazole (NIZORAL) 2 % shampoo Apply and lather in  to damp scalp, leave on skin 3-5 minutes and then rinse 3 times weekly. 120 mL 11     triamcinolone (KENALOG) 0.1 % ointment Apply topically 2 times daily To ears for 2-3 weeks, then a few times per week for maintenance. 80 g 3     clobetasol (TEMOVATE) 0.05 % external solution Apply to affected areas of scalp (above ears) once daily. (Patient not taking: Reported on 12/18/2017) 50 mL 3     Allergies   Allergen Reactions     Fexofenadine Other (See Comments)     Bloody nose         Review of Systems:  -As per HPI  -Constitutional: The patient denies fatigue, fevers, chills, unintended weight loss, and night sweats.  -HEENT: Patient denies nonhealing oral sores.  -Skin: As above in HPI. No additional skin concerns.    Physical exam:  Vitals: /87 (Cuff Size: Adult Regular)  Pulse 105  GEN: This is a well developed, well-nourished female in no acute distress, in a pleasant mood.    SKIN: Focused examination of the scalp and ears was performed.  -Robust layer 1-2, layers at 4-5, 8-10.  -Mild folliculitis and oil present at scalp. No erythema.  -Positive pull test in L frontal area. Negative pull test R frontal area.   -No other lesions of concern on areas examined.   -Ears: no pinkness, no scale, venous prominence noted (similar to previous photos)    Impression/Plan:  1. Nonscarring alopecia  - multifactorial  - improved growth in vertex scalp. Stable frontal hair line. Robust regrowth layer of 1-2 cm present throughout median part and in posterior vertex. Mild folliculitis but no erythema present.    Discussed that patient will have iron and ferritin labs redrawn. If ferritin low, will be referred to see a hematologist.     Discussed altering shampoo regimen so that patient shampoos daily, alternating use of ketoconazole and clobetasol shampoo.    Continue with Rogaine 5% foam daily    Continue with laser comb three times weekly    Continue with daily iron (324 mg), multivitamin, biotin    2. Bilateral ear rash  - psoriasis vs contact dermatitis vs seborrheic dermatitis - resolved    Continue using triamcinolone ointment PRN for flare ups    Follow-up in 6 months, earlier for new or changing lesions.     Staff Involved:  Scribed by SEBAS Ogden for Dr. Velvet Huston.        Staff Physician:  I was present with the medical student who participated in the service and in the documentation of the note. I have verified the history and personally performed the physical exam and medical decision making. I agree with the assessment and plan of care as documented in the note.     Velvet Huston MD  Professor and Chair  Department of Dermatology  [facility]: Phone: [phone], Fax:[phone]  [facility]: Phone: [phone], Fax: [phone]  8/26/2018

## 2018-08-13 NOTE — MR AVS SNAPSHOT
After Visit Summary   8/13/2018    Daily Vyas    MRN: [id number]           Patient Information     Date Of Birth          1995        Visit Information        Provider Department      8/13/2018 3:50 PM Velvet Huston MD [facility] Dermatology        Today's Diagnoses     Low iron stores    -  1      Care Instructions    1. Alter shampoo regimen so that you shampoo each day, alternating ketoconazole and clobetasol shampoo every other day.  2. Continue with Rogaine throughout scalp daily.  3. Continue with laser comb three times weekly.  4. Continue with daily vitamins.    Come see us again in 6 months.          Follow-ups after your visit        Your next 10 appointments already scheduled     Aug 13, 2018  5:15 PM CDT   LAB with [facility] ([facility] [place])    [address] [place] [id number] [phone]           Please do not eat 10-12 hours before your appointment if you are coming in fasting for labs on lipids, cholesterol, or glucose (sugar). This does not apply to pregnant women. Water, hot tea and black coffee (with nothing added) are okay. Do not drink other fluids, diet soda or chew gum.              Future tests that were ordered for you today     Open Future Orders        Priority Expected Expires Ordered    Ferritin Routine  8/13/2019 8/13/2018    Iron and iron binding capacity Routine  8/13/2019 8/13/2018            Who to contact     Please call your clinic at [phone] to:    Ask questions about your health    Make or cancel appointments    Discuss your medicines    Learn about your test results    Speak to your doctor            Additional Information About Your Visit        [facility] Information     [facility] gives you secure access to your electronic health record. If you see a primary care provider, you can also send messages to your care team and make appointments. If you have questions, please call  your primary care clinic.  If you do not have a primary care provider, please call [phone] and they will assist you.      [employer] is an electronic gateway that provides easy, online access to your medical records. With [employer], you can request a clinic appointment, read your test results, renew a prescription or communicate with your care team.     To access your existing account, please contact your [facility] or call [phone] for assistance.        Care EveryWhere ID     This is your Care EveryWhere ID. This could be used by other organizations to access your [place] medical records  [id number]        Your Vitals Were     Pulse                   105            Blood Pressure from Last 3 Encounters:   08/13/18 127/87   05/07/18 134/79   12/18/17 131/79    Weight from Last 3 Encounters:   No data found for Wt               Primary Care Provider    None Specified       No primary provider on file.        Equal Access to Services     [facility] : Hadangel Kim, robb tong, francine hammond, cynthia paulson . So [facility] [phone].    ATENCIÓN: Si habla español, tiene a ruff disposición servicios gratuitos de asistencia lingüística. Llame al [phone].    We comply with applicable federal civil rights laws and [place] laws. We do not discriminate on the basis of race, color, national origin, age, disability, sex, sexual orientation, or gender identity.            Thank you!     Thank you for choosing [facility] DERMATOLOG[facility]  for your care. Our goal is always to provide you with excellent care. Hearing back from our patients is one way we can continue to improve our services. Please take a few minutes to complete the written survey that you may receive in the mail after your visit with us. Thank you!             Your Updated Medication List - Protect others around you: Learn how to safely use, store and throw away your  medicines at www.disposemymeds.org.          This list is accurate as of 8/13/18  5:03 PM.  Always use your most recent med list.                   Brand Name Dispense Instructions for use Diagnosis    * clobetasol 0.05 % external solution    TEMOVATE    50 mL    Apply to affected areas of scalp (above ears) once daily.    AA (alopecia areata), Dermatitis, seborrheic       * clobetasol propionate 0.05 % Sham    CLOBEX    1 Bottle    Apply to a dry scalp, leave on for 10 minutes, then lather and rinse, do every other day, rotating with 2% ketoconazole shampoo    Dermatitis       desogestrel-ethinyl estradiol 0.1/0.125/0.15 -0.025 MG per tablet    JOSS, VELIVET     Take 1 tablet by mouth daily        ibuprofen 200 MG tablet    ADVIL/MOTRIN     Take 200 mg by mouth as needed        ketoconazole 2 % shampoo    NIZORAL    120 mL    Apply and lather in to damp scalp, leave on skin 3-5 minutes and then rinse 3 times weekly.    AA (alopecia areata), Dermatitis, seborrheic       triamcinolone 0.1 % ointment    KENALOG    80 g    Apply topically 2 times daily To ears for 2-3 weeks, then a few times per week for maintenance.    Psoriasis       * Notice:  This list has 2 medication(s) that are the same as other medications prescribed for you. Read the directions carefully, and ask your doctor or other care provider to review them with you.

## 2018-08-13 NOTE — PATIENT INSTRUCTIONS
1. Alter shampoo regimen so that you shampoo each day, alternating ketoconazole and clobetasol shampoo every other day.  2. Continue with Rogaine throughout scalp daily.  3. Continue with laser comb three times weekly.  4. Continue with daily vitamins.    Come see us again in 6 months.

## 2018-08-13 NOTE — NURSING NOTE
"Dermatology Rooming Note    Daily Vyas's goals for this visit include:   Chief Complaint   Patient presents with     Hair Loss     Daily is here today for a hair loss follow up- Daily states \"I think there is more hair shedding than there should be\".      Rima Montanez MA    "

## 2018-11-05 DIAGNOSIS — L30.9 DERMATITIS: ICD-10-CM

## 2018-11-06 RX ORDER — CLOBETASOL PROPIONATE 0.05 G/100ML
SHAMPOO TOPICAL
Qty: 118 ML | Refills: 0 | Status: SHIPPED | OUTPATIENT
Start: 2018-11-06 | End: 2019-05-13

## 2019-02-04 ENCOUNTER — OFFICE VISIT (OUTPATIENT)
Dept: DERMATOLOGY | Facility: CLINIC | Age: 24
End: 2019-02-04
Payer: COMMERCIAL

## 2019-02-04 VITALS — RESPIRATION RATE: 18 BRPM | DIASTOLIC BLOOD PRESSURE: 85 MMHG | SYSTOLIC BLOOD PRESSURE: 143 MMHG | HEART RATE: 92 BPM

## 2019-02-04 DIAGNOSIS — R79.0 LOW IRON STORES: Primary | ICD-10-CM

## 2019-02-04 DIAGNOSIS — L21.9 DERMATITIS, SEBORRHEIC: ICD-10-CM

## 2019-02-04 DIAGNOSIS — R79.0 LOW IRON STORES: ICD-10-CM

## 2019-02-04 DIAGNOSIS — L65.9 LOSS OF HAIR: ICD-10-CM

## 2019-02-04 LAB
FERRITIN SERPL-MCNC: 35 NG/ML (ref 12–150)
IRON SATN MFR SERPL: 70 % (ref 15–46)
IRON SERPL-MCNC: 233 UG/DL (ref 35–180)
TIBC SERPL-MCNC: 335 UG/DL (ref 240–430)

## 2019-02-04 RX ORDER — FLUOCINOLONE ACETONIDE 0.11 MG/ML
OIL TOPICAL
Qty: 118 ML | Refills: 1 | Status: SHIPPED | OUTPATIENT
Start: 2019-02-04 | End: 2021-11-01

## 2019-02-04 ASSESSMENT — PAIN SCALES - GENERAL: PAINLEVEL: NO PAIN (0)

## 2019-02-04 NOTE — PROGRESS NOTES
"[facility] Dermatology Note      Dermatology Problem List:  1. Non-scarring alopecia  - Scalp biopsy 1/2017 most consistent with psoriatic alopecia vs. alopecia areata, initially did not have any evidence of psoriasis elsewhere on body but rash on bilateral ears noted 5/7/18    - ILK 10 mg/cc 4/2017 and 5/2017 without much improvement.  - Current treatment: alternate clobetasol 0.05% shampoo and ketoconazole 2% shampoo daily, laser comb TIW, Rogaine 5% foam daily, daily multivitamin, biotin, and iron supplements.  - Discussed PRP treatment    2. Seborrheic Dermatitis  - 2% ketoconazole shampoo every other day   - derma-smoothe/fs oil 1-2x weekly     3. Psoriasis vs contact dermatitis vs seborrheic dermatitis, b/l ears (b/l conchal bowl and  R lobe)  - 0.1% Triamcinolone ointment BID prn  - Consider patch testing in future if persistent     4. History of low iron stores - will check labs today.    Encounter Date: Feb 4, 2019    CC:  Chief Complaint   Patient presents with     Hair Loss     Daily is here today for a hair loss follow up- Daily states \"it's good\".          History of Present Illness:  Ms. Daily Vyas is a 23 year old female who presents as a follow-up for non-scarring hair loss. The patient was last seen on 8/2018. She has noticed some hair regrowth especially at the temples. She denies itching, burning, and pain.     Currently she is using 2% ketoconazole and Clobex alternating daily, Rogaine 5% foam for the scalp daily, laser comb 3x weekly, and a 324 mg iron supplement daily.     No new medical condition or medications.    Past Medical History:   Patient Active Problem List   Diagnosis     Alopecia     Dermatitis, seborrheic     Loss of hair     Low iron stores     Past Medical History:   Diagnosis Date     Eczema      Nickel allergy     identified by allergy to belt buckle     Uncomplicated asthma      History reviewed. No pertinent surgical history.    Social " History:  Patient reports that  has never smoked. she has never used smokeless tobacco.    Family History:  Family History   Problem Relation Age of Onset     Melanoma No family hx of      Skin Cancer No family hx of        Medications:  Current Outpatient Medications   Medication Sig Dispense Refill     clobetasol propionate 0.05 % SHAM APPLY TO DRY SCALP, LEAVE ON 10 MINS THEN LATHER & RINSE*DO EVERY OTHER DAY ROTATE W/KETOCONAZOLE 118 mL 0     desogestrel-ethinyl estradiol (JOSS, VELIVET) 0.1/0.125/0.15 -0.025 MG per tablet Take 1 tablet by mouth daily       ibuprofen (ADVIL/MOTRIN) 200 MG tablet Take 200 mg by mouth as needed       ketoconazole (NIZORAL) 2 % shampoo Apply and lather in to damp scalp, leave on skin 3-5 minutes and then rinse 3 times weekly. 120 mL 11     triamcinolone (KENALOG) 0.1 % ointment Apply topically 2 times daily To ears for 2-3 weeks, then a few times per week for maintenance. 80 g 3     clobetasol (TEMOVATE) 0.05 % external solution Apply to affected areas of scalp (above ears) once daily. (Patient not taking: Reported on 12/18/2017) 50 mL 3     Allergies   Allergen Reactions     Fexofenadine Other (See Comments)     Bloody nose         Review of Systems:  -As per HPI  -Constitutional: Otherwise feeling well today, in usual state of health.  -HEENT: Patient denies nonhealing oral sores.  -Skin: As above in HPI. No additional skin concerns.    Physical exam:  Vitals: /85 (BP Location: Right arm, Patient Position: Sitting, Cuff Size: Adult Regular)   Pulse 92   Resp 18   GEN: This is a well developed, well-nourished female in no acute distress, in a pleasant mood.    SKIN: Focused examination of the scalp and face was performed.  - hair density is improved at the bilateral temples compared to photos, still thin in left parietal region  - robust regrowth fibers at the frontal hair line, 2-3 cm   - part width: 1.1-1.2, frontal  - regrowth layers:    1st: 1-2 cm, robust   2nd: 3-4  cm, robust   3rd: 6-7 cm   4th: 10 cm  - negative hair pull test  - seborrhea throughout scalp with localized sparse scale in several areas, localized patchy pink erythema   - eyebrows and lashes are full  -No other lesions of concern on areas examined.                                 Impression/Plan:  1. Non-scarring alopecia - stable to improved    Labs: iron panel and ferritin     Continue alternating use of 2%  ketoconazole and clobetasol shampoo.    Continue with Rogaine 5% foam daily    Continue with laser comb three times weekly    Continue with daily iron (324 mg), multivitamin, biotin     Discussed treatment with PRP     2. Seborrheic Dermatitis    Start derma-Smoothe/fs oil 1-2x weekly        Follow-up in 4-6 months, earlier for new or changing lesions.     Staff Involved:  Jose Mc MD  Dermatology Research Fellow     Patient was seen and examined with the clinical research fellow. I agree with the history, review of systems, physical examination, assessments and plan.    Velvet Huston MD  Professor and  Chair  Department of Dermatology  [facility]

## 2019-02-04 NOTE — PROGRESS NOTES
"[facility] Dermatology Note      Dermatology Problem List:  1. Non-scarring alopecia  - Scalp biopsy 1/2017 most consistent with psoriatic alopecia vs. alopecia areata, initially did not have any evidence of psoriasis elsewhere on body but rash on bilateral ears noted 5/7/18 could be consistent with psoriasis (vs contact dermatitis or seborrheic dermatitis). Still no plaques elsewhere, no joint pain, no nail changes; possible family history of psoriasis or eczema in dad and grandpa. A component of androgenetic alopecia is also suspected based on her physical exam findings.   - Labs: Testosterone, DHEA-S, Vitamin D, TSH, CBC, iron panel normal; ferritin initially 11 in 6/2017, improved on repeats.   - ILK 10 mg/cc 4/2017 and 5/2017 without much improvement.  - Current treatment: alternate clobetasol 0.05% shampoo and ketoconazole 2% shampoo daily, laser comb TIW, Rogaine 5% foam daily, daily multivitamin, biotin, and iron supplements.    2. Psoriasis vs contact dermatitis vs seborrheic dermatitis, b/l ears (b/l conchal bowl and helix crura, R lobe)  - Triamcinolone ointment BID prn  - Consider patch testing in future if persistent     Encounter Date: Feb 4, 2019    CC:  Chief Complaint   Patient presents with     Hair Loss     Daily is here today for a hair loss follow up- Daily states \"it's good\".          History of Present Illness:  Ms. Daily Vyas is a 23 year old female who presents as a follow-up for non-scarring alopecia (psoriatic alopecia vs. alopecia areata). The patient was last seen 5/7/18 when the frontal and temporal areas had improved but the vertex region had progressed. Since then, she has been washing her hair Mon-Fri with clobetasol shampoo, and with ketoconazole shampoo on the weekends. She has used Rogaine daily to her entire scalp, a laser comb three times weekly, and is taking iron, biotin, and a multivitamin daily. She denies any pain, burn, itch, or numbness " of the scalp. She is otherwise feeling well today and has had no changes in medical history since she was last seen. Her ferritin level was 16 in May 2018, so she was advised to take 324mg iron daily, which she has done, and has tolerated decently with some mild stomach cramping.    She has noticed increased shedding onto her comb, about 15-20 strands per side, multiple times per day.     Her ear rash has cleared with use of triamcinolone.     Past Medical History:   Patient Active Problem List   Diagnosis     Alopecia     Dermatitis, seborrheic     Loss of hair     Low iron stores     Past Medical History:   Diagnosis Date     Eczema      Nickel allergy     identified by allergy to belt buckle     Uncomplicated asthma      History reviewed. No pertinent surgical history.    Social History:  Social History     Socioeconomic History     Marital status: Single     Spouse name: Not on file     Number of children: Not on file     Years of education: Not on file     Highest education level: Not on file   Social Needs     Financial resource strain: Not on file     Food insecurity - worry: Not on file     Food insecurity - inability: Not on file     Transportation needs - medical: Not on file     Transportation needs - non-medical: Not on file   Occupational History     Not on file   Tobacco Use     Smoking status: Never Smoker     Smokeless tobacco: Never Used   Substance and Sexual Activity     Alcohol use: Not on file     Drug use: Not on file     Sexual activity: Not on file   Other Topics Concern     Parent/sibling w/ CABG, MI or angioplasty before 65F 55M? Not Asked   Social History Narrative     Not on file       Family History:  Family History   Problem Relation Age of Onset     Melanoma No family hx of      Skin Cancer No family hx of        Medications:  Current Outpatient Medications   Medication Sig Dispense Refill     clobetasol propionate 0.05 % SHAM APPLY TO DRY SCALP, LEAVE ON 10 MINS THEN LATHER & RINSE*DO  EVERY OTHER DAY ROTATE W/KETOCONAZOLE 118 mL 0     desogestrel-ethinyl estradiol (JOSS, VELIVET) 0.1/0.125/0.15 -0.025 MG per tablet Take 1 tablet by mouth daily       ibuprofen (ADVIL/MOTRIN) 200 MG tablet Take 200 mg by mouth as needed       ketoconazole (NIZORAL) 2 % shampoo Apply and lather in to damp scalp, leave on skin 3-5 minutes and then rinse 3 times weekly. 120 mL 11     triamcinolone (KENALOG) 0.1 % ointment Apply topically 2 times daily To ears for 2-3 weeks, then a few times per week for maintenance. 80 g 3     clobetasol (TEMOVATE) 0.05 % external solution Apply to affected areas of scalp (above ears) once daily. (Patient not taking: Reported on 12/18/2017) 50 mL 3     Allergies   Allergen Reactions     Fexofenadine Other (See Comments)     Bloody nose         Review of Systems:  -As per HPI  -Constitutional: The patient denies fatigue, fevers, chills, unintended weight loss, and night sweats.  -HEENT: Patient denies nonhealing oral sores.  -Skin: As above in HPI. No additional skin concerns.    Physical exam:  Vitals: /85 (BP Location: Right arm, Patient Position: Sitting, Cuff Size: Adult Regular)   Pulse 92   Resp 18   GEN: This is a well developed, well-nourished female in no acute distress, in a pleasant mood.    SKIN: Focused examination of the scalp and ears was performed.  -Robust layer 1-2, layers at 4-5, 8-10.  -Mild folliculitis and oil present at scalp. No erythema.  -Positive pull test in L frontal area. Negative pull test R frontal area.   -No other lesions of concern on areas examined.   -Ears: no pinkness, no scale, venous prominence noted (similar to previous photos)    Impression/Plan:  1. Nonscarring alopecia  - multifactorial  - improved growth in vertex scalp. Stable frontal hair line. Robust regrowth layer of 1-2 cm present throughout median part and in posterior vertex. Mild folliculitis but no erythema present.    Discussed that patient will have iron and ferritin labs  redrawn. If ferritin low, will be referred to see a hematologist.     Discussed altering shampoo regimen so that patient shampoos daily, alternating use of ketoconazole and clobetasol shampoo.    Continue with Rogaine 5% foam daily    Continue with laser comb three times weekly    Continue with daily iron (324 mg), multivitamin, biotin    2. Bilateral ear rash - psoriasis vs contact dermatitis vs seborrheic dermatitis - resolved    Continue using triamcinolone ointment PRN for flare ups    Follow-up in 6 months, earlier for new or changing lesions.     Staff Involved:  Staff/Scribe/Student    Scribe Disclosure:   I, Gopal Viveros, am serving as a scribe to document services personally performed by Dr. Velvet Huston, based on data collection and the provider's statements to me.       Staff Physician:  I was present with the medical student who participated in the service and in the documentation of the note. I have verified the history and personally performed the physical exam and medical decision making. I agree with the assessment and plan of care as documented in the note.     Velvet Huston MD  Professor and Chair  Department of Dermatology  [facility]: Phone: [phone], Fax:[phone]  [facility]: Phone: [phone], Fax: [phone]  8/26/2018

## 2019-02-04 NOTE — NURSING NOTE
"Dermatology Rooming Note    Daily Vyas's goals for this visit include:   Chief Complaint   Patient presents with     Hair Loss     Daily is here today for a hair loss follow up- Daily states \"it's good\".      Rima Montanez, RMADITI     "

## 2019-02-04 NOTE — LETTER
"2/4/2019       RE: Daily Vyas  [address]  [facility] [address]     Dear Colleague,    Thank you for referring your patient, Daily Vyas, to the [facility] DERMATOLOGY at [facility]. Please see a copy of my visit note below.                                Pictures were placed in Pt's chart today for future reference.      [facility] Dermatology Note      Dermatology Problem List:  1. Non-scarring alopecia  - Scalp biopsy 1/2017 most consistent with psoriatic alopecia vs. alopecia areata, initially did not have any evidence of psoriasis elsewhere on body but rash on bilateral ears noted 5/7/18 could be consistent with psoriasis (vs contact dermatitis or seborrheic dermatitis). Still no plaques elsewhere, no joint pain, no nail changes; possible family history of psoriasis or eczema in dad and grandpa. A component of androgenetic alopecia is also suspected based on her physical exam findings.   - Labs: Testosterone, DHEA-S, Vitamin D, TSH, CBC, iron panel normal; ferritin initially 11 in 6/2017, improved on repeats.   - ILK 10 mg/cc 4/2017 and 5/2017 without much improvement.  - Current treatment: alternate clobetasol 0.05% shampoo and ketoconazole 2% shampoo daily, laser comb TIW, Rogaine 5% foam daily, daily multivitamin, biotin, and iron supplements.    2. Psoriasis vs contact dermatitis vs seborrheic dermatitis, b/l ears (b/l conchal bowl and helix crura, R lobe)  - Triamcinolone ointment BID prn  - Consider patch testing in future if persistent     Encounter Date: Feb 4, 2019    CC:  Chief Complaint   Patient presents with     Hair Loss     Daily is here today for a hair loss follow up- Daily states \"it's good\".          History of Present Illness:  Ms. Daily Vyas is a 23 year old female who presents as a follow-up for non-scarring alopecia (psoriatic alopecia vs. alopecia areata). The patient was last seen 5/7/18 " when the frontal and temporal areas had improved but the vertex region had progressed. Since then, she has been washing her hair Mon-Fri with clobetasol shampoo, and with ketoconazole shampoo on the weekends. She has used Rogaine daily to her entire scalp, a laser comb three times weekly, and is taking iron, biotin, and a multivitamin daily. She denies any pain, burn, itch, or numbness of the scalp. She is otherwise feeling well today and has had no changes in medical history since she was last seen. Her ferritin level was 16 in May 2018, so she was advised to take 324mg iron daily, which she has done, and has tolerated decently with some mild stomach cramping.    She has noticed increased shedding onto her comb, about 15-20 strands per side, multiple times per day.     Her ear rash has cleared with use of triamcinolone.     Past Medical History:   Patient Active Problem List   Diagnosis     Alopecia     Dermatitis, seborrheic     Loss of hair     Low iron stores     Past Medical History:   Diagnosis Date     Eczema      Nickel allergy     identified by allergy to belt buckle     Uncomplicated asthma      History reviewed. No pertinent surgical history.    Social History:  Social History     Socioeconomic History     Marital status: Single     Spouse name: Not on file     Number of children: Not on file     Years of education: Not on file     Highest education level: Not on file   Social Needs     Financial resource strain: Not on file     Food insecurity - worry: Not on file     Food insecurity - inability: Not on file     Transportation needs - medical: Not on file     Transportation needs - non-medical: Not on file   Occupational History     Not on file   Tobacco Use     Smoking status: Never Smoker     Smokeless tobacco: Never Used   Substance and Sexual Activity     Alcohol use: Not on file     Drug use: Not on file     Sexual activity: Not on file   Other Topics Concern     Parent/sibling w/ CABG, MI or  angioplasty before 65F 55M? Not Asked   Social History Narrative     Not on file       Family History:  Family History   Problem Relation Age of Onset     Melanoma No family hx of      Skin Cancer No family hx of        Medications:  Current Outpatient Medications   Medication Sig Dispense Refill     clobetasol propionate 0.05 % SHAM APPLY TO DRY SCALP, LEAVE ON 10 MINS THEN LATHER & RINSE*DO EVERY OTHER DAY ROTATE W/KETOCONAZOLE 118 mL 0     desogestrel-ethinyl estradiol (JOSS, VELIVET) 0.1/0.125/0.15 -0.025 MG per tablet Take 1 tablet by mouth daily       ibuprofen (ADVIL/MOTRIN) 200 MG tablet Take 200 mg by mouth as needed       ketoconazole (NIZORAL) 2 % shampoo Apply and lather in to damp scalp, leave on skin 3-5 minutes and then rinse 3 times weekly. 120 mL 11     triamcinolone (KENALOG) 0.1 % ointment Apply topically 2 times daily To ears for 2-3 weeks, then a few times per week for maintenance. 80 g 3     clobetasol (TEMOVATE) 0.05 % external solution Apply to affected areas of scalp (above ears) once daily. (Patient not taking: Reported on 12/18/2017) 50 mL 3     Allergies   Allergen Reactions     Fexofenadine Other (See Comments)     Bloody nose         Review of Systems:  -As per HPI  -Constitutional: The patient denies fatigue, fevers, chills, unintended weight loss, and night sweats.  -HEENT: Patient denies nonhealing oral sores.  -Skin: As above in HPI. No additional skin concerns.    Physical exam:  Vitals: /85 (BP Location: Right arm, Patient Position: Sitting, Cuff Size: Adult Regular)   Pulse 92   Resp 18   GEN: This is a well developed, well-nourished female in no acute distress, in a pleasant mood.    SKIN: Focused examination of the scalp and ears was performed.  -Robust layer 1-2, layers at 4-5, 8-10.  -Mild folliculitis and oil present at scalp. No erythema.  -Positive pull test in L frontal area. Negative pull test R frontal area.   -No other lesions of concern on areas examined.    -Ears: no pinkness, no scale, venous prominence noted (similar to previous photos)    Impression/Plan:  1. Nonscarring alopecia  - multifactorial  - improved growth in vertex scalp. Stable frontal hair line. Robust regrowth layer of 1-2 cm present throughout median part and in posterior vertex. Mild folliculitis but no erythema present.    Discussed that patient will have iron and ferritin labs redrawn. If ferritin low, will be referred to see a hematologist.     Discussed altering shampoo regimen so that patient shampoos daily, alternating use of ketoconazole and clobetasol shampoo.    Continue with Rogaine 5% foam daily    Continue with laser comb three times weekly    Continue with daily iron (324 mg), multivitamin, biotin    2. Bilateral ear rash - psoriasis vs contact dermatitis vs seborrheic dermatitis - resolved    Continue using triamcinolone ointment PRN for flare ups    Follow-up in 6 months, earlier for new or changing lesions.     Staff Involved:  Staff/Scribe/Student    Scribe Disclosure:   I, Gopal Viveros, am serving as a scribe to document services personally performed by Dr. Velvet Huston, based on data collection and the provider's statements to me.       Staff Physician:  I was present with the medical student who participated in the service and in the documentation of the note. I have verified the history and personally performed the physical exam and medical decision making. I agree with the assessment and plan of care as documented in the note.     Velvet Huston MD  Professor and Chair  Department of Dermatology  [facility]: Phone: [phone], Fax:[phone]  [facility]: Phone: [phone], Fax: [phone]  8/26/2018        [facility] Dermatology Note      Dermatology Problem List:  1. Non-scarring alopecia  - Scalp biopsy 1/2017 most consistent with  "psoriatic alopecia vs. alopecia areata, initially did not have any evidence of psoriasis elsewhere on body but rash on bilateral ears noted 5/7/18    - ILK 10 mg/cc 4/2017 and 5/2017 without much improvement.  - Current treatment: alternate clobetasol 0.05% shampoo and ketoconazole 2% shampoo daily, laser comb TIW, Rogaine 5% foam daily, daily multivitamin, biotin, and iron supplements.  - Discussed PRP treatment    2. Seborrheic Dermatitis  - 2% ketoconazole shampoo every other day   - derma-smoothe/fs oil 1-2x weekly     3. Psoriasis vs contact dermatitis vs seborrheic dermatitis, b/l ears (b/l conchal bowl and  R lobe)  - 0.1% Triamcinolone ointment BID prn  - Consider patch testing in future if persistent     4. History of low iron stores - will check labs today.    Encounter Date: Feb 4, 2019    CC:  Chief Complaint   Patient presents with     Hair Loss     Daily is here today for a hair loss follow up- Daily states \"it's good\".          History of Present Illness:  Ms. Daily Vyas is a 23 year old female who presents as a follow-up for non-scarring hair loss. The patient was last seen on 8/2018. She has noticed some hair regrowth especially at the temples. She denies itching, burning, and pain.     Currently she is using 2% ketoconazole and Clobex alternating daily, Rogaine 5% foam for the scalp daily, laser comb 3x weekly, and a 324 mg iron supplement daily.     No new medical condition or medications.    Past Medical History:   Patient Active Problem List   Diagnosis     Alopecia     Dermatitis, seborrheic     Loss of hair     Low iron stores     Past Medical History:   Diagnosis Date     Eczema      Nickel allergy     identified by allergy to belt buckle     Uncomplicated asthma      History reviewed. No pertinent surgical history.    Social History:  Patient reports that  has never smoked. she has never used smokeless tobacco.    Family History:  Family History   Problem Relation Age of Onset "     Melanoma No family hx of      Skin Cancer No family hx of        Medications:  Current Outpatient Medications   Medication Sig Dispense Refill     clobetasol propionate 0.05 % SHAM APPLY TO DRY SCALP, LEAVE ON 10 MINS THEN LATHER & RINSE*DO EVERY OTHER DAY ROTATE W/KETOCONAZOLE 118 mL 0     desogestrel-ethinyl estradiol (JOSS, VELIVET) 0.1/0.125/0.15 -0.025 MG per tablet Take 1 tablet by mouth daily       ibuprofen (ADVIL/MOTRIN) 200 MG tablet Take 200 mg by mouth as needed       ketoconazole (NIZORAL) 2 % shampoo Apply and lather in to damp scalp, leave on skin 3-5 minutes and then rinse 3 times weekly. 120 mL 11     triamcinolone (KENALOG) 0.1 % ointment Apply topically 2 times daily To ears for 2-3 weeks, then a few times per week for maintenance. 80 g 3     clobetasol (TEMOVATE) 0.05 % external solution Apply to affected areas of scalp (above ears) once daily. (Patient not taking: Reported on 12/18/2017) 50 mL 3     Allergies   Allergen Reactions     Fexofenadine Other (See Comments)     Bloody nose         Review of Systems:  -As per HPI  -Constitutional: Otherwise feeling well today, in usual state of health.  -HEENT: Patient denies nonhealing oral sores.  -Skin: As above in HPI. No additional skin concerns.    Physical exam:  Vitals: /85 (BP Location: Right arm, Patient Position: Sitting, Cuff Size: Adult Regular)   Pulse 92   Resp 18   GEN: This is a well developed, well-nourished female in no acute distress, in a pleasant mood.    SKIN: Focused examination of the scalp and face was performed.  - hair density is improved at the bilateral temples compared to photos, still thin in left parietal region  - robust regrowth fibers at the frontal hair line, 2-3 cm   - part width: 1.1-1.2, frontal  - regrowth layers:    1st: 1-2 cm, robust   2nd: 3-4 cm, robust   3rd: 6-7 cm   4th: 10 cm  - negative hair pull test  - seborrhea throughout scalp with localized sparse scale in several areas, localized  patchy pink erythema   - eyebrows and lashes are full  -No other lesions of concern on areas examined.                                 Impression/Plan:  3. Non-scarring alopecia - stable to improved    Labs: iron panel and ferritin     Continue alternating use of 2%  ketoconazole and clobetasol shampoo.    Continue with Rogaine 5% foam daily    Continue with laser comb three times weekly    Continue with daily iron (324 mg), multivitamin, biotin     Discussed treatment with PRP     2. Seborrheic Dermatitis    Start derma-Smoothe/fs oil 1-2x weekly        Follow-up in 4-6 months, earlier for new or changing lesions.     Staff Involved:  Jose Mc MD  Dermatology Research Fellow     Patient was seen and examined with the clinical research fellow. I agree with the history, review of systems, physical examination, assessments and plan.      Velvet Huston MD

## 2019-05-13 DIAGNOSIS — L21.9 DERMATITIS, SEBORRHEIC: ICD-10-CM

## 2019-05-13 DIAGNOSIS — L30.9 DERMATITIS: ICD-10-CM

## 2019-05-13 DIAGNOSIS — L63.9 AA (ALOPECIA AREATA): ICD-10-CM

## 2019-05-15 ENCOUNTER — MYC REFILL (OUTPATIENT)
Dept: DERMATOLOGY | Facility: CLINIC | Age: 24
End: 2019-05-15

## 2019-05-15 DIAGNOSIS — L63.9 AA (ALOPECIA AREATA): ICD-10-CM

## 2019-05-15 DIAGNOSIS — L30.9 DERMATITIS: ICD-10-CM

## 2019-05-15 DIAGNOSIS — L21.9 DERMATITIS, SEBORRHEIC: ICD-10-CM

## 2019-05-15 RX ORDER — CLOBETASOL PROPIONATE 0.05 G/100ML
SHAMPOO TOPICAL
Qty: 118 ML | Refills: 0 | Status: SHIPPED | OUTPATIENT
Start: 2019-05-15 | End: 2024-03-19

## 2019-05-16 RX ORDER — KETOCONAZOLE 20 MG/ML
SHAMPOO TOPICAL
Qty: 120 ML | Refills: 8 | Status: SHIPPED | OUTPATIENT
Start: 2019-05-16 | End: 2020-07-11

## 2019-05-19 RX ORDER — KETOCONAZOLE 20 MG/ML
SHAMPOO TOPICAL
Qty: 120 ML | Refills: 8 | OUTPATIENT
Start: 2019-05-19

## 2019-05-19 RX ORDER — CLOBETASOL PROPIONATE 0.05 G/100ML
SHAMPOO TOPICAL
Qty: 118 ML | Refills: 0 | Status: SHIPPED | OUTPATIENT
Start: 2019-05-19 | End: 2019-09-25

## 2019-08-26 ENCOUNTER — OFFICE VISIT (OUTPATIENT)
Dept: DERMATOLOGY | Facility: CLINIC | Age: 24
End: 2019-08-26
Payer: COMMERCIAL

## 2019-08-26 VITALS — SYSTOLIC BLOOD PRESSURE: 135 MMHG | DIASTOLIC BLOOD PRESSURE: 85 MMHG | HEART RATE: 95 BPM

## 2019-08-26 DIAGNOSIS — L30.9 DERMATITIS: ICD-10-CM

## 2019-08-26 DIAGNOSIS — L21.9 DERMATITIS, SEBORRHEIC: ICD-10-CM

## 2019-08-26 DIAGNOSIS — L65.9 LOSS OF HAIR: Primary | ICD-10-CM

## 2019-08-26 DIAGNOSIS — L65.9 LOSS OF HAIR: ICD-10-CM

## 2019-08-26 LAB
BASOPHILS # BLD AUTO: 0 10E9/L (ref 0–0.2)
BASOPHILS NFR BLD AUTO: 0.5 %
DIFFERENTIAL METHOD BLD: NORMAL
EOSINOPHIL # BLD AUTO: 0.1 10E9/L (ref 0–0.7)
EOSINOPHIL NFR BLD AUTO: 2 %
ERYTHROCYTE [DISTWIDTH] IN BLOOD BY AUTOMATED COUNT: 12.6 % (ref 10–15)
FERRITIN SERPL-MCNC: 84 NG/ML (ref 12–150)
HCT VFR BLD AUTO: 41.1 % (ref 35–47)
HGB BLD-MCNC: 13.6 G/DL (ref 11.7–15.7)
IMM GRANULOCYTES # BLD: 0 10E9/L (ref 0–0.4)
IMM GRANULOCYTES NFR BLD: 0.3 %
IRON SATN MFR SERPL: 67 % (ref 15–46)
IRON SERPL-MCNC: 195 UG/DL (ref 35–180)
LYMPHOCYTES # BLD AUTO: 1.5 10E9/L (ref 0.8–5.3)
LYMPHOCYTES NFR BLD AUTO: 22.1 %
MCH RBC QN AUTO: 29.8 PG (ref 26.5–33)
MCHC RBC AUTO-ENTMCNC: 33.1 G/DL (ref 31.5–36.5)
MCV RBC AUTO: 90 FL (ref 78–100)
MONOCYTES # BLD AUTO: 0.5 10E9/L (ref 0–1.3)
MONOCYTES NFR BLD AUTO: 6.8 %
NEUTROPHILS # BLD AUTO: 4.5 10E9/L (ref 1.6–8.3)
NEUTROPHILS NFR BLD AUTO: 68.3 %
NRBC # BLD AUTO: 0 10*3/UL
NRBC BLD AUTO-RTO: 0 /100
PLATELET # BLD AUTO: 276 10E9/L (ref 150–450)
PROT SERPL-MCNC: 7.3 G/DL (ref 6.8–8.8)
RBC # BLD AUTO: 4.56 10E12/L (ref 3.8–5.2)
TIBC SERPL-MCNC: 291 UG/DL (ref 240–430)
TSH SERPL DL<=0.005 MIU/L-ACNC: 1.98 MU/L (ref 0.4–4)
WBC # BLD AUTO: 6.6 10E9/L (ref 4–11)

## 2019-08-26 PROCEDURE — 82306 VITAMIN D 25 HYDROXY: CPT | Performed by: DERMATOLOGY

## 2019-08-26 ASSESSMENT — PAIN SCALES - GENERAL: PAINLEVEL: NO PAIN (0)

## 2019-08-26 NOTE — PROGRESS NOTES
[facility] Dermatology Note      Dermatology Problem List:  1. 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 alopecia areata, initially did not have any evidence of psoriasis elsewhere on body but rash on bilateral ears noted 5/7/18    - ILK 10 mg/cc 4/2017 and 5/2017 without much improvement.  - Current Tx: alternate clobetasol 0.05% shampoo and ketoconazole 2% shampoo daily, laser comb TIW, Rogaine 5% foam daily, daily multivitamin, biotin, and iron supplements.  - Discussed adding spironolactone treatment - pending labs 8/26/19    2. Seborrheic Dermatitis  - Current Tx: derma-smoothe/fs oil 1-2x weekly     3. History of dermatitis, behind ears  - s/p 0.1% Triamcinolone ointment    4. History of low iron stores - check labs today along with total protein    5. Likely EIC left cheek  -Trial BPO wash  -Discussed possible need for dermatologic surgery referral; however, pt considering    Encounter Date: Aug 26, 2019    CC:  Chief Complaint   Patient presents with     Hair Loss     Non-scarring alopecia - Daily notes that she is still loosing hair.          History of Present Illness:  Ms. Daily Vyas is a 24 year old female who presents as a follow-up for non-scarring hair alopecia. The patient was last seen on 2/4/2019.    Today, she reports she is doing okay, but is still seeing a lot of shedding on clothing. She thinks there is an increase in the hair showing up on her comb. This increased shedding started about 2 months ago. She began eating a vegan diet about 3 months ago, but isn't sure if this is affecting her recent shedding.She denies any itching, burning, tingling, or stinging in the scalp. Patient takes an OCP and reports regular menstrual periods. She is with her mother this visit. Patient also reports the presence of a lesion on her left cheek that has appeared  over the past few months. It is not painful or itchy.    Currently she is using 2% ketoconazole and Clobex alternating daily, Rogaine 5% foam for the scalp daily, laser comb 3x weekly, and a 324 mg iron supplement, biotin supplement, and multivitamin daily. She uses derma-smoothe oil 1-2x weekly for seborrheic dermatitis. She has been especially focusing the laser comb on the sides since this is a thin area. Patient is open to trying new medicine, but is concerned about hair growth vs. hair retention since she is having promising hair growth, but the hair is falling out. No other concerns.    Past Medical History:   Patient Active Problem List   Diagnosis     Alopecia     Dermatitis, seborrheic     Loss of hair     Low iron stores     Past Medical History:   Diagnosis Date     Eczema      Nickel allergy     identified by allergy to belt buckle     Uncomplicated asthma      No past surgical history on file.    Social History:  Patient reports that she has never smoked. She has never used smokeless tobacco. Patient started eating a vegan diet mid-2019.    Family History:  No family history of skin cancer.    Medications:  Current Outpatient Medications   Medication Sig Dispense Refill     clobetasol propionate (CLOBEX) 0.05 % external shampoo APPLY TO DRY SCALP, LEAVE ON 10 MINS THEN LATHER & RINSE*DO EVERY OTHER DAY ROTATE W/KETOCONAZOLE 118 mL 0     desogestrel-ethinyl estradiol (JOSS, VELIVET) 0.1/0.125/0.15 -0.025 MG per tablet Take 1 tablet by mouth daily       Fluocinolone Acetonide Scalp 0.01 % OIL oil Apply 1-2 caps full to scalp for 4 hours, preferably overnight, then shampoo. Apply 1-2 times weekly. 118 mL 1     ibuprofen (ADVIL/MOTRIN) 200 MG tablet Take 200 mg by mouth as needed       ketoconazole (NIZORAL) 2 % external shampoo APPLY & LATHER ONTO DAMP SCALP & LEAVE ON 3-5MIN FOR 3 TIMES WEEKLY 120 mL 8     triamcinolone (KENALOG) 0.1 % ointment Apply topically 2 times daily To ears for 2-3 weeks, then a  few times per week for maintenance. 80 g 3     clobetasol (TEMOVATE) 0.05 % external solution Apply to affected areas of scalp (above ears) once daily. (Patient not taking: Reported on 12/18/2017) 50 mL 3     clobetasol propionate (CLOBEX) 0.05 % external shampoo APPLY TO DRY SCALP, LEAVE ON 10 MINS THEN LATHER & RINSE*DO EVERY OTHER DAY ROTATE W/KETOCONAZOLE (Patient not taking: Reported on 8/26/2019) 118 mL 0     Allergies   Allergen Reactions     Fexofenadine Other (See Comments)     Bloody nose         Review of Systems:  -Constitutional: Otherwise feeling well today, in usual state of health.  -Skin: As above in HPI. No additional skin concerns.    Physical exam:  Vitals: /85   Pulse 95   GEN: This is a well developed, well-nourished female in no acute distress, in a pleasant mood.    SKIN: Focused examination of the scalp, hands  and face was performed.  - Hair density is improved at the temples and frontal hair line compared to previous photos; remains thin on sides  - Scalp looks overall healthy  - Perifollicular accentuation on frontal scalp  - part width: 1.1 at front, 1.2 in back  - regrowth layers:    1st: 1-2 cm, robust   2nd: 3-4 cm, robust   3rd: 8-10 cm   4th: 12-14 cm  - 1-2 cm fibers along frontal hairline  - Eyebrows and lashes are WNL  - 1 cm soft, freely movable subcutaneous nodule, left cheek  - No other lesions of concern on areas examined.       Impression/Plan:  1. Non-scarring alopecia: presently favoring female pattern hair loss; previously ddx with psoriatic alopecia vs AA. Overall improved with some persistent thinning on the sides. Discussed anti-androgen therapy and patient will consider. Will repeat labs today.    Check CBC w/diff, iron studies, ferritin, TSH, vitamin d and total protein today    Continue alternating use of 2%  ketoconazole and clobetasol shampoo.    Continue with Rogaine 5% foam daily    Continue with laser comb three times weekly    Continue with daily iron  (324 mg), multivitamin, biotin     Discussed treatment with spironolactone, pending today's labs    2. Seborrheic Dermatitis    Continue Derma-Smoothe FS  oil 1-2x weekly    3. Nodule, most consistent with cyst, left cheek    Recommend starting benzoyl peroxide wash    Recommend follow-up with surgical dermatologist for removal if desired pending response to the above     Follow-up over the phone in 1-2 weeks, earlier for new or changing lesions.     Dr. Huston staffed this patient.    Staff Involved:  Scribe(below)/Resident (Charles)/Staff    Scribe Disclosure  I, Jayda Ludivina, am serving as a scribe to document services personally performed by Dr. Velvet Huston MD, based on data collection and the provider's statements to me.    Melvin Soto MD  Internal Medicine - Dermatology PGY 2  [phone]    Patient was seen and examined with the medicine/dermatology resident. I agree with the history, review of systems, physical examination, assessments and plan.    Velvet Huston MD  Professor and  Chair  Department of Dermatology  [facility]

## 2019-08-26 NOTE — LETTER
8/26/2019       RE: Daily Vyas  [address]  [place] [address]     Dear Colleague,    Thank you for referring your patient, Daily Vyas, to the [facility] DERMATOLOGY at [facility]. Please see a copy of my visit note below.    [facility] Dermatology Note      Dermatology Problem List:  1. 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 alopecia areata, initially did not have any evidence of psoriasis elsewhere on body but rash on bilateral ears noted 5/7/18    - ILK 10 mg/cc 4/2017 and 5/2017 without much improvement.  - Current Tx: alternate clobetasol 0.05% shampoo and ketoconazole 2% shampoo daily, laser comb TIW, Rogaine 5% foam daily, daily multivitamin, biotin, and iron supplements.  - Discussed adding spironolactone treatment - pending labs 8/26/19    2. Seborrheic Dermatitis  - Current Tx: derma-smoothe/fs oil 1-2x weekly     3. History of dermatitis, behind ears  - s/p 0.1% Triamcinolone ointment    4. History of low iron stores - check labs today along with total protein    5. Likely EIC left cheek  -Trial BPO wash  -Discussed possible need for dermatologic surgery referral; however, pt considering    Encounter Date: Aug 26, 2019    CC:  Chief Complaint   Patient presents with     Hair Loss     Non-scarring alopecia - Daily notes that she is still loosing hair.          History of Present Illness:  Ms. Daily Vyas is a 24 year old female who presents as a follow-up for non-scarring hair alopecia. The patient was last seen on 2/4/2019.    Today, she reports she is doing okay, but is still seeing a lot of shedding on clothing. She thinks there is an increase in the hair showing up on her comb. This increased shedding started about 2 months ago. She began eating a vegan diet about 3 months ago, but isn't sure  if this is affecting her recent shedding.She denies any itching, burning, tingling, or stinging in the scalp. Patient takes an OCP and reports regular menstrual periods. She is with her mother this visit. Patient also reports the presence of a lesion on her left cheek that has appeared over the past few months. It is not painful or itchy.    Currently she is using 2% ketoconazole and Clobex alternating daily, Rogaine 5% foam for the scalp daily, laser comb 3x weekly, and a 324 mg iron supplement, biotin supplement, and multivitamin daily. She uses derma-smoothe oil 1-2x weekly for seborrheic dermatitis. She has been especially focusing the laser comb on the sides since this is a thin area. Patient is open to trying new medicine, but is concerned about hair growth vs. hair retention since she is having promising hair growth, but the hair is falling out. No other concerns.    Past Medical History:   Patient Active Problem List   Diagnosis     Alopecia     Dermatitis, seborrheic     Loss of hair     Low iron stores     Past Medical History:   Diagnosis Date     Eczema      Nickel allergy     identified by allergy to belt buckle     Uncomplicated asthma      No past surgical history on file.    Social History:  Patient reports that she has never smoked. She has never used smokeless tobacco. Patient started eating a vegan diet mid-2019.    Family History:  No family history of skin cancer.    Medications:  Current Outpatient Medications   Medication Sig Dispense Refill     clobetasol propionate (CLOBEX) 0.05 % external shampoo APPLY TO DRY SCALP, LEAVE ON 10 MINS THEN LATHER & RINSE*DO EVERY OTHER DAY ROTATE W/KETOCONAZOLE 118 mL 0     desogestrel-ethinyl estradiol (JOSS, VELIVET) 0.1/0.125/0.15 -0.025 MG per tablet Take 1 tablet by mouth daily       Fluocinolone Acetonide Scalp 0.01 % OIL oil Apply 1-2 caps full to scalp for 4 hours, preferably overnight, then shampoo. Apply 1-2 times weekly. 118 mL 1     ibuprofen  (ADVIL/MOTRIN) 200 MG tablet Take 200 mg by mouth as needed       ketoconazole (NIZORAL) 2 % external shampoo APPLY & LATHER ONTO DAMP SCALP & LEAVE ON 3-5MIN FOR 3 TIMES WEEKLY 120 mL 8     triamcinolone (KENALOG) 0.1 % ointment Apply topically 2 times daily To ears for 2-3 weeks, then a few times per week for maintenance. 80 g 3     clobetasol (TEMOVATE) 0.05 % external solution Apply to affected areas of scalp (above ears) once daily. (Patient not taking: Reported on 12/18/2017) 50 mL 3     clobetasol propionate (CLOBEX) 0.05 % external shampoo APPLY TO DRY SCALP, LEAVE ON 10 MINS THEN LATHER & RINSE*DO EVERY OTHER DAY ROTATE W/KETOCONAZOLE (Patient not taking: Reported on 8/26/2019) 118 mL 0     Allergies   Allergen Reactions     Fexofenadine Other (See Comments)     Bloody nose         Review of Systems:  -Constitutional: Otherwise feeling well today, in usual state of health.  -Skin: As above in HPI. No additional skin concerns.    Physical exam:  Vitals: /85   Pulse 95   GEN: This is a well developed, well-nourished female in no acute distress, in a pleasant mood.    SKIN: Focused examination of the scalp, hands  and face was performed.  - Hair density is improved at the temples and frontal hair line compared to previous photos; remains thin on sides  - Scalp looks overall healthy  - Perifollicular accentuation on frontal scalp  - part width: 1.1 at front, 1.2 in back  - regrowth layers:    1st: 1-2 cm, robust   2nd: 3-4 cm, robust   3rd: 8-10 cm   4th: 12-14 cm  - 1-2 cm fibers along frontal hairline  - Eyebrows and lashes are WNL  - 1 cm soft, freely movable subcutaneous nodule, left cheek  - No other lesions of concern on areas examined.       Impression/Plan:  1. Non-scarring alopecia: presently favoring female pattern hair loss; previously ddx with psoriatic alopecia vs AA. Overall improved with some persistent thinning on the sides. Discussed anti-androgen therapy and patient will consider. Will  repeat labs today.    Check CBC w/diff, iron studies, ferritin, TSH, vitamin d and total protein today    Continue alternating use of 2%  ketoconazole and clobetasol shampoo.    Continue with Rogaine 5% foam daily    Continue with laser comb three times weekly    Continue with daily iron (324 mg), multivitamin, biotin     Discussed treatment with spironolactone, pending today's labs    2. Seborrheic Dermatitis    Continue Derma-Smoothe FS  oil 1-2x weekly    3. Nodule, most consistent with cyst, left cheek    Recommend starting benzoyl peroxide wash    Recommend follow-up with surgical dermatologist for removal if desired pending response to the above     Follow-up over the phone in 1-2 weeks, earlier for new or changing lesions.     Dr. Huston staffed this patient.    Staff Involved:  Scribe(below)/Resident (Charles)/Staff    Scribe Disclosure  I, Jayda Ludivina, am serving as a scribe to document services personally performed by Dr. Velvet Huston MD, based on data collection and the provider's statements to me.    Melvin Soto MD  Internal Medicine - Dermatology PGY 2  [phone]    Patient was seen and examined with the medicine/dermatology resident. I agree with the history, review of systems, physical examination, assessments and plan.    Velvet Huston MD  Professor and  Chair  Department of Dermatology  [facility]

## 2019-08-27 LAB — DEPRECATED CALCIDIOL+CALCIFEROL SERPL-MC: 40 UG/L (ref 20–75)

## 2019-09-22 ENCOUNTER — TELEPHONE (OUTPATIENT)
Dept: DERMATOLOGY | Facility: CLINIC | Age: 24
End: 2019-09-22

## 2019-09-22 DIAGNOSIS — L65.9 LOSS OF HAIR: Primary | ICD-10-CM

## 2019-09-22 DIAGNOSIS — L65.9 ALOPECIA: ICD-10-CM

## 2019-09-22 RX ORDER — SPIRONOLACTONE 25 MG/1
50 TABLET ORAL DAILY
Qty: 90 TABLET | Refills: 1 | Status: SHIPPED | OUTPATIENT
Start: 2019-09-22 | End: 2019-12-16

## 2019-09-22 NOTE — TELEPHONE ENCOUNTER
"This afternoon Daily and I connected and reviewed by phone her lab results and how she is doing. She had received the letter about her elevated iron levels and has since discontinued taking her iron supplement. We discussed that we should repeat these tests in 2-3 months to insure she is maintaining good levels and does not demonstrate a problem with absorption.     In regards to her hair, she notes \"no change\" and has been reading about spironolactone. In addition to this, we also discussed the current use of oral minoxidil at a low dose as well as PRP to treat thinning hair in women.. At the end of the conversation, we agreed to continue all current treatments - weekly Dermasmooth fs, PBM and topical Rogaine and add Spironolactone 50 mg daily. We reviewed she could not get pregnant while taking this medication. Script will be sent and she will make her final decision this week and if \"yes\" will  the medication. Follow-up appointment is already scheduled for December.    Velvet Huston MD  "

## 2019-09-25 ENCOUNTER — MYC REFILL (OUTPATIENT)
Dept: DERMATOLOGY | Facility: CLINIC | Age: 24
End: 2019-09-25

## 2019-09-25 DIAGNOSIS — L30.9 DERMATITIS: ICD-10-CM

## 2019-09-26 ENCOUNTER — TELEPHONE (OUTPATIENT)
Dept: FAMILY MEDICINE | Facility: CLINIC | Age: 24
End: 2019-09-26

## 2019-09-26 RX ORDER — CLOBETASOL PROPIONATE 0.05 G/100ML
SHAMPOO TOPICAL
Qty: 118 ML | Refills: 0 | Status: SHIPPED | OUTPATIENT
Start: 2019-09-26 | End: 2020-02-13

## 2019-09-26 NOTE — TELEPHONE ENCOUNTER
"Patient sent a web request today:    \"Cyst or mole\"    Patient would like to be [facility] at [facility].    Please contact patient.    Thank you.    Central Scheduling  Kim DONG"

## 2019-09-26 NOTE — TELEPHONE ENCOUNTER
clobetasol propionate (CLOBEX) 0.05 % external shampoo      Last Written Prescription Date:  5-19-19  Last Fill Quantity: 118 ml,   # refills: 0  Last Office Visit : 8-26-19  Future Office visit:  12-16-19   ml:0 sent to pharm

## 2019-09-26 NOTE — TELEPHONE ENCOUNTER
Left message to contact clinic and when contacted please schedule as directed below. ALBA Reagan   Principal Discharge DX:	Hematoma and contusion  Secondary Diagnosis:	Coagulation defect

## 2019-10-18 NOTE — TELEPHONE ENCOUNTER
Informed patient of the below information. She is fine seeing Dr. Otto and understands that she is not guaranteed to see Dr. Huston if she's staffing.     She still wants to make sure her chart is reviewed by Dr. Huston for a referral.  Informed patient that her chart has been submitted for review.   Avani Hilton RN      SUBJECTIVE:     Renaldo Barnett is a 4 year old male, here for a routine health maintenance visit.    Patient was roomed by: Janene Mccauley CMA    Last WCC was on 10/18/2018 by me.  Discussed Renaldo's growth and development.   rainstormed ideas with mom to deal with fits of temper. Advised to avoid punishments and to normalize the situations.   Advised against forcing him to eat any specific foods. Offer healthier options when he is hungry.   Recommended to monitor for weight gain Renaldo as he should be thinning out more at this age.    TODAY:    Doesn't sit still sometimes, but sat well during the vision and hearing testing. Will sit down to read a book with mom.     Does not like vegetables. Will only eat bananas and strawberries. Will eat homemade pasta sauce. Also will take packets.     Will drink milk and water. Eats meat, chicken, and fish. Mother brushes teeth. Sleeping well at night. Not snoring or restless.      has  program.  has no concerns with development. Gets along with other kids well.     Had two MMR vaccines before 4th birthday.     Well Child     Family/Social History  Forms to complete? No  Child lives with::  Mother, father and brothers  Who takes care of your child?:    Languages spoken in the home:  English  Recent family changes/ special stressors?:  Recent birth of a baby    Safety  Is your child around anyone who smokes?  No    TB Exposure:     No TB exposure    Car seat or booster in back seat?  Yes  Bike or sport helmet for bike trailer or trike?  Yes    Home Safety Survey:      Wood stove / Fireplace screened?  Yes     Poisons / cleaning supplies out of reach?:  Yes     Swimming pool?:  No     Firearms in the home?: YES          Are trigger locks present?  Yes        Is ammunition stored separately? Yes     Child ever home alone?  No    Daily Activities    Diet and Exercise     Child gets at least 4 servings fruit or vegetables daily: NO    Consumes  beverages other than lowfat white milk or water: No    Dairy/calcium sources: whole milk    Calcium servings per day: >3    Child gets at least 60 minutes per day of active play: Yes    TV in child's room: No    Sleep       Sleep concerns: no concerns- sleeps well through night and bedtime struggles     Bedtime: 19:30     Sleep duration (hours): 11    Elimination       Urinary frequency:4-6 times per 24 hours     Stool frequency: 1-3 times per 24 hours     Stool consistency: soft     Elimination problems:  None     Toilet training status:  Toilet trained- day, not night    Media     Types of media used: iPad and video/dvd/tv    Daily use of media (hours): 2    Dental    Water source:  City water    Dental provider: patient has a dental home    Dental exam in last 6 months: Yes     No dental risks      Dental visit recommended: Dental home established, continue care every 6 months  Dental varnish declined by parent    Cardiac risk assessment:     Family history (males <55, females <65) of angina (chest pain), heart attack, heart surgery for clogged arteries, or stroke: no    Biological parent(s) with a total cholesterol over 240:  no  Dyslipidemia risk:    None    VISION    Corrective lenses: No corrective lenses  Tool used: CYNDI  Right eye: 10/16 (20/32)   Left eye: 10/16 (20/32)   Two Line Difference: No   Visual Acuity: Pass      Vision Assessment: normal    HEARING   Right Ear:      1000 Hz RESPONSE- on Level: 40 db (Conditioning sound)   1000 Hz: RESPONSE- on Level:   20 db    2000 Hz: RESPONSE- on Level:   20 db    4000 Hz: RESPONSE- on Level:   20 db     Left Ear:      4000 Hz: RESPONSE- on Level:   20 db    2000 Hz: RESPONSE- on Level:   20 db    1000 Hz: RESPONSE- on Level:   20 db     500 Hz: RESPONSE- on Level: 25 db    Right Ear:    500 Hz: RESPONSE- on Level: 25 db    Hearing Acuity: Pass    Hearing Assessment: normal    DEVELOPMENT/SOCIAL-EMOTIONAL SCREEN  Screening tool used, reviewed with  "parent/guardian:   Electronic PSC   PSC SCORES 10/18/2019   Inattentive / Hyperactive Symptoms Subtotal 4   Externalizing Symptoms Subtotal 5   Internalizing Symptoms Subtotal 1   PSC - 17 Total Score 10      no followup necessary       PROBLEM LIST  Patient Active Problem List   Diagnosis     Foreskin adhesions     MEDICATIONS  Current Outpatient Medications   Medication Sig Dispense Refill     IBUPROFEN PO         ALLERGY  No Known Allergies    IMMUNIZATIONS  Immunization History   Administered Date(s) Administered     DTAP (<7y) 01/27/2017     DTAP-IPV/HIB (PENTACEL) 2015, 02/18/2016, 04/22/2016     HEPA 11/04/2016, 05/19/2017     HepB 2015, 2015, 04/22/2016     Hib (PRP-T) 01/27/2017     Influenza (IIV3) PF 11/04/2016     Influenza Vaccine IM > 6 months Valent IIV4 10/18/2018     Influenza Vaccine IM Ages 6-35 Months 4 Valent (PF) 04/22/2016, 12/06/2016, 10/20/2017     MMR 11/04/2016, 05/19/2017     Pneumo Conj 13-V (2010&after) 2015, 02/18/2016, 04/22/2016, 01/27/2017     Rotavirus, monovalent, 2-dose 2015, 02/18/2016     Varicella 11/04/2016       HEALTH HISTORY SINCE LAST VISIT  Seen by me on 7/12/2019 for strep throat  - Prescribed amoxicillin.     Seen by Dr. Branch on 4/5/2019 for exposure to meningitis and fever - Viral URI. Meningitis testing was negative.     ROS  Constitutional, eye, ENT, skin, respiratory, cardiac, GI, MSK, neuro, and allergy are normal except as otherwise noted.    This document serves as a record of the services and decisions personally performed and made by Joaquina Longoria MD. It was created on his behalf by Reginald Ramirez, a trained medical scribe. The creation of this document is based the provider's statements to the medical scribe.  Reginald Ramirez October 18, 2019 4:11 PM   OBJECTIVE:   EXAM  BP 91/55 (BP Location: Right arm, Patient Position: Sitting, Cuff Size: Child)   Pulse 95   Temp 97  F (36.1  C) (Axillary)   Resp 22   Ht 3' 4.25\" (1.022 m)   " Wt 35 lb (15.9 kg)   SpO2 98%   BMI 15.19 kg/m    49 %ile based on CDC (Boys, 2-20 Years) Stature-for-age data based on Stature recorded on 10/18/2019.  42 %ile based on CDC (Boys, 2-20 Years) weight-for-age data based on Weight recorded on 10/18/2019.  34 %ile based on CDC (Boys, 2-20 Years) BMI-for-age based on body measurements available as of 10/18/2019.  Blood pressure percentiles are 49 % systolic and 72 % diastolic based on the August 2017 AAP Clinical Practice Guideline.   GENERAL: Active, alert, in no acute distress.  SKIN: Clear. No significant rash, abnormal pigmentation or lesions  HEAD: Normocephalic.  EYES:  Symmetric light reflex and no eye movement on cover/uncover test. Normal conjunctivae.  EARS: Normal canals. Tympanic membranes are normal; gray and translucent.  NOSE: Normal without discharge.  MOUTH/THROAT: Clear. No oral lesions. Teeth without obvious abnormalities.  NECK: Supple, no masses.  No thyromegaly.  LYMPH NODES: No adenopathy  LUNGS: Clear. No rales, rhonchi, wheezing or retractions  HEART: Regular rhythm. Normal S1/S2. No murmurs. Normal pulses.  ABDOMEN: Soft, non-tender, not distended, no masses or hepatosplenomegaly. Bowel sounds normal.   GENITALIA: Normal male external genitalia. [place] stage I,  both testes descended, no hernia or hydrocele.    EXTREMITIES: Full range of motion, no deformities  NEUROLOGIC: No focal findings. Cranial nerves grossly intact: DTR's normal. Normal gait, strength and tone    ASSESSMENT/PLAN:       ICD-10-CM    1. Encounter for routine child health examination w/o abnormal findings Z00.129 DTAP-IPV VACC 4-6 YR IM [[id number]]     CHICKEN POX VACCINE (VARICELLA)[[id number]]       Anticipatory Guidance  Reviewed Anticipatory Guidance in patient instructions    Diet   You can put all kinds of vegetables into the pasta sauce.   I also recommend salsa and coleslaw.   Potatoes and corn doesn't count as vegetable sources.     Preventive Care  Plan  Immunizations    See orders in EpicCare.  I reviewed the signs and symptoms of adverse effects and when to seek medical care if they should arise.  Referrals/Ongoing Specialty care: No   See other orders in EpicCare.  BMI at 34 %ile based on CDC (Boys, 2-20 Years) BMI-for-age based on body measurements available as of 10/18/2019.  No weight concerns.    FOLLOW-UP:    in 1 year for a Preventive Care visit  Resources  Goal Tracker: Be More Active  Goal Tracker: Less Screen Time  Goal Tracker: Drink More Water  Goal Tracker: Eat More Fruits and Veggies  [place] Child and Teen Checkups (C&TC) Schedule of Age-Related Screening Standards    The information in this document, created by the medical scribe for me, accurately reflects the services I personally performed and the decisions made by me. I have reviewed and approved this document for accuracy prior to leaving the patient care area.   Joaquina Longoria MD October 18, 2019 4:25 PM    Joaquina Longoria MD  [facility]

## 2019-11-01 ENCOUNTER — PRE VISIT (OUTPATIENT)
Dept: SURGERY | Facility: CLINIC | Age: 24
End: 2019-11-01

## 2019-11-01 NOTE — TELEPHONE ENCOUNTER
FUTURE VISIT INFORMATION      FUTURE VISIT INFORMATION:    Date: 11/20/19    Time: 8:00am    Location: [facility]  REFERRAL INFORMATION:    Referring provider:  Self    Referring providers clinic:  N/A    Reason for visit/diagnosis  Epidermal cyst     RECORDS REQUESTED FROM:       No recs to collect

## 2019-11-06 ENCOUNTER — TELEPHONE (OUTPATIENT)
Dept: SURGERY | Facility: CLINIC | Age: 24
End: 2019-11-06

## 2019-11-06 NOTE — TELEPHONE ENCOUNTER
[facility] Call Center    Phone Message    May a detailed message be left on voicemail: yes    Reason for Call: Other: Pt called to speak with someone to see how far out Dr. Ramirez is booking for surgery. Pt has an appointment set for 11/20 for a consult for a cyst removal, and pt is hoping to have surgery done before the end of the year for insurance reasons. Pt said that Dr. Huston in Dermatology has taken a look at the cyst as well and confirmed that it is a cyst that would need to be removed by plastic surgery.      Action Taken: Message routed to:  [facility] Pl[facility] Surgery

## 2019-11-20 ENCOUNTER — TELEPHONE (OUTPATIENT)
Dept: SURGERY | Facility: CLINIC | Age: 24
End: 2019-11-20

## 2019-11-20 ENCOUNTER — OFFICE VISIT (OUTPATIENT)
Dept: PLASTIC SURGERY | Facility: CLINIC | Age: 24
End: 2019-11-20
Payer: COMMERCIAL

## 2019-11-20 VITALS
HEART RATE: 106 BPM | SYSTOLIC BLOOD PRESSURE: 137 MMHG | HEIGHT: 67 IN | WEIGHT: 202 LBS | BODY MASS INDEX: 31.71 KG/M2 | OXYGEN SATURATION: 96 % | DIASTOLIC BLOOD PRESSURE: 84 MMHG

## 2019-11-20 DIAGNOSIS — L98.9 CHANGING SKIN LESION: Primary | ICD-10-CM

## 2019-11-20 ASSESSMENT — MIFFLIN-ST. JEOR: SCORE: 1698.9

## 2019-11-20 ASSESSMENT — PAIN SCALES - GENERAL: PAINLEVEL: NO PAIN (0)

## 2019-11-20 NOTE — TELEPHONE ENCOUNTER
----- Message from CHRISTELLE Ramirez MD sent at 11/20/2019  8:17 AM CST -----  Regarding: Procedure  Hi    Please add for clinic procedure    Thanks    [place]

## 2019-11-20 NOTE — LETTER
11/20/2019       RE: Daily Vysa  [address]  [place] [address]     Dear Colleague,    Thank you for referring your patient, Daily Vyas, to the [facility] PLASTIC AND RECONSTRUCTIVE SURGERY at [facility]. Please see a copy of my visit note below.    NEW PATIENT VISIT      PRESENTING COMPLAINT:  Left cheek cyst.      HISTORY OF PRESENTING COMPLAINT:  Ms. Vyas is 24 years old.  Had a cheek cyst develop about July of this year.  It has slowly grown in size, become very prominent and painful.  Here to have it removed.        PAST MEDICAL HISTORY:  Alopecia.      PAST SURGICAL HISTORY:  Nil.      MEDICATIONS:  Alopecia hair products.      ALLERGIES:  Nil.      SOCIAL HISTORY:  Does not smoke, does not drink.  Lives in [place], is a .      REVIEW OF SYSTEMS:  Denies chest pain, shortness of breath, MI, CVA, DVT and PE.      PHYSICAL EXAMINATION:  Vital signs are stable.  She is afebrile, in no obvious distress.  She is 5 feet 7 inches, 200 pounds with a BMI of 32 kg/m2.  On examination of her left cheek, she has a 1.5 x 1.5 cm, slightly erythematous cyst-like structure on the left cheek.      ASSESSMENT AND PLAN:  Based on above findings, a diagnosis of a cheek cyst versus mass was made.  I think an excisional biopsy is warranted given the fact it is growing.  This can be done under local anesthesia at my [facility].  We will call her and scheduled for it.  All risks, benefits and alternatives of the procedure including pain, infection, bleeding, scarring, asymmetry, seromas, hematomas, wound breakdown, wound dehiscence, prominent scar, hypertrophic scar, keloid scar, numbness, recurrence, requirement of further surgery depending on pathology, injury to deeper structures like nerves, vessels and muscles, DVT, PE, MI, CVA, pneumonia, renal failure and death were explained.  She understood them all and wants to proceed.  I look forward to  helping her out in the near future.      Total time spent with patient 20 minutes, more than half was counseling.    Again, thank you for allowing me to participate in the care of your patient.      Sincerely,    CHRISTELLE Ramirez MD

## 2019-11-20 NOTE — TELEPHONE ENCOUNTER
Pt called, no answer. VM Id's pt. Left detailed message with reason for call and  for pt to call the [facility] back at [phone].    Patient is wanting to schedule an excision on her cheek. Writer called to schedule. Note in chart stated patient wanted to have the procedure done before the end of the year. Please let patient know that his procedure slots are booked into mid January.    Domenica Cordero LPN

## 2019-11-20 NOTE — TELEPHONE ENCOUNTER
"Pt returned call. Pt scheduled with Dr. Ramirez for excision on January 17, 2020. Pt is wondering about CPT and diagnosis code to see if insurance will cover procedure. She is also wondering about being placed on a \"cancellation list.\" Pt advised that writer will discuss with nurse who works closely with him for answers to her questions. Discussed with ABIEL Antonio who states that writer can send request for CPT and diagnosis codes to Dr. Ramirez's  and that pt can be placed on wait list.     Called patient back regarding note above. Pt was informed that writer will be routing message to  and pt will be added to wait list. No further questions at this time.  Lori Herndon LPN      "

## 2019-11-20 NOTE — PROGRESS NOTES
NEW PATIENT VISIT      PRESENTING COMPLAINT:  Left cheek cyst.      HISTORY OF PRESENTING COMPLAINT:  Ms. Vyas is 24 years old.  Had a cheek cyst develop about July of this year.  It has slowly grown in size, become very prominent and painful.  Here to have it removed.        PAST MEDICAL HISTORY:  Alopecia.      PAST SURGICAL HISTORY:  Nil.      MEDICATIONS:  Alopecia hair products.      ALLERGIES:  Nil.      SOCIAL HISTORY:  Does not smoke, does not drink.  Lives in [place], is a .      REVIEW OF SYSTEMS:  Denies chest pain, shortness of breath, MI, CVA, DVT and PE.      PHYSICAL EXAMINATION:  Vital signs are stable.  She is afebrile, in no obvious distress.  She is 5 feet 7 inches, 200 pounds with a BMI of 32 kg/m2.  On examination of her left cheek, she has a 1.5 x 1.5 cm, slightly erythematous cyst-like structure on the left cheek.      ASSESSMENT AND PLAN:  Based on above findings, a diagnosis of a cheek cyst versus mass was made.  I think an excisional biopsy is warranted given the fact it is growing.  This can be done under local anesthesia at my [facility].  We will call her and scheduled for it.  All risks, benefits and alternatives of the procedure including pain, infection, bleeding, scarring, asymmetry, seromas, hematomas, wound breakdown, wound dehiscence, prominent scar, hypertrophic scar, keloid scar, numbness, recurrence, requirement of further surgery depending on pathology, injury to deeper structures like nerves, vessels and muscles, DVT, PE, MI, CVA, pneumonia, renal failure and death were explained.  She understood them all and wants to proceed.  I look forward to helping her out in the near future.      Total time spent with patient 20 minutes, more than half was counseling.

## 2019-11-20 NOTE — NURSING NOTE
"Chief Complaint   Patient presents with     Consult     new pt here for L cheek EIC consult       Vitals:    11/20/19 0803   BP: 137/84   BP Location: Left arm   Patient Position: Chair   Cuff Size: Adult Regular   Pulse: 106   SpO2: 96%   Weight: 91.6 kg (202 lb)   Height: 1.702 m (5' 7\")       Body mass index is 31.64 kg/m .    Everton Soto EMT    "

## 2019-11-25 NOTE — TELEPHONE ENCOUNTER
Pt called and notified of possible codes below. Pt verbalized understanding and thanked RN for the call..Marisela Serrano RN              For this pt, possible codes are -     Dx:  L72.9  Left cheek cyst, 1.5 cm - Follicular cyst of the skin and subcutaneous tissue, unspecified     CPT's;   09527  Excision, other benign lesion including margins, face; excised diameter 1.1 to 2.0 cm   38678  Excision, other benign lesion including margins, face; excised diameter 2.1 to 3.0 cm   05981  Repair, intermediate, wounds of face; 2.5 cm or less   28783  Excision, tumor, soft tissue of face, subcutaneous; less than 2 cm   34123  Excision, tumor, soft tissue of face, subcutaneous; greater than 2 cm     These will be communicated to the Dr Ramirez's outpt  at [place].     Thank you,   Haylee

## 2019-12-16 ENCOUNTER — OFFICE VISIT (OUTPATIENT)
Dept: DERMATOLOGY | Facility: CLINIC | Age: 24
End: 2019-12-16
Payer: COMMERCIAL

## 2019-12-16 VITALS — SYSTOLIC BLOOD PRESSURE: 136 MMHG | HEART RATE: 114 BPM | DIASTOLIC BLOOD PRESSURE: 87 MMHG

## 2019-12-16 DIAGNOSIS — L21.9 DERMATITIS, SEBORRHEIC: Primary | ICD-10-CM

## 2019-12-16 DIAGNOSIS — L65.9 LOSS OF HAIR: ICD-10-CM

## 2019-12-16 DIAGNOSIS — L65.9 ALOPECIA: ICD-10-CM

## 2019-12-16 RX ORDER — SPIRONOLACTONE 50 MG/1
100 TABLET, FILM COATED ORAL DAILY
Qty: 60 TABLET | Refills: 2 | Status: SHIPPED | OUTPATIENT
Start: 2019-12-16 | End: 2020-03-14

## 2019-12-16 ASSESSMENT — PAIN SCALES - GENERAL: PAINLEVEL: NO PAIN (0)

## 2019-12-16 NOTE — NURSING NOTE
Dermatology Rooming Note    Daily Vyas's goals for this visit include:   Chief Complaint   Patient presents with     Derm Problem     Non-scarring hair loss - no change since starting the spironolactone.     Annette Couch, CMA

## 2019-12-16 NOTE — PROGRESS NOTES
[facility] Dermatology Note      Dermatology Problem List:  1. 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 alopecia areata, initially did not have any evidence of psoriasis elsewhere on body but rash on bilateral ears noted 5/7/18    - ILK 10 mg/cc 4/2017 and 5/2017 without much improvement.  - Current Tx: alternate clobetasol 0.05% shampoo and ketoconazole 2% shampoo daily, laser comb TIW, Rogaine 5% foam daily, daily multivitamin, spironolactone 100 mg daily    2. Seborrheic Dermatitis  - Current Tx: derma-smoothe/fs oil 1-2x weekly     3. History of dermatitis, behind ears  - s/p 0.1% Triamcinolone ointment    4. History of low iron stores  - [facility] 8/26/2019    Encounter Date: Dec 16, 2019    CC:  Chief Complaint   Patient presents with     Derm Problem     Non-scarring hair loss - no change since starting the spironolactone.         History of Present Illness:  Ms. Daily Vyas is a 24 year old female who presents as a follow-up for non-scarring hair loss. She was last seen on 08/26/2019 when she was continued with alternating ketoconazole and clobetasol shampoos, Rogaine 5% foam daily, Derma-Smoothe oil 1-2x weekly, and LLLT 3x weekly. On 9/22/2019 she was started on 50 mg spironolactone daily after her labs were checked on 08/26/2019. Today she reports she is not seeing any improvement since starting spironolactone and is interested in increasing her dose. She is still noticing hair shedding on her shirts and pillows. She notices it the most on her temples. She is currently taking 50 mg spironolactone daily, using Rogaine 5% foam once a day, ketoconazole 2% shampoo every other day, clobetasol shampoo every other day, and laser comb three times weekly. She reports she previously stopped ILK injections because she was not seeing improvement. She reports she is tolerating the  spironolactone well and enies menstrual irregularities, breast tenderness, or excessive urination. She had a cyst on her left cheek removed this weekend at [facility] and has a bandage over it. The pathology has not returned yet on what type of cyst was removed. No other concerns.    Past Medical History:   Patient Active Problem List   Diagnosis     Alopecia     Dermatitis, seborrheic     Loss of hair     Low iron stores     Past Medical History:   Diagnosis Date     Eczema      Nickel allergy     identified by allergy to belt buckle     Uncomplicated asthma      No past surgical history on file.    Social History:  Patient reports that she has never smoked. She has never used smokeless tobacco. Patient started eating a vegan diet mid-2019. Patient is a  for a staffing agency.    Family History:  No family history of skin cancer.    Medications:  Current Outpatient Medications   Medication Sig Dispense Refill     clobetasol propionate (CLOBEX) 0.05 % external shampoo APPLY TO DRY SCALP, LEAVE ON 10 MINS THEN LATHER & RINSE*DO EVERY OTHER DAY ROTATE W/KETOCONAZOLE 118 mL 0     clobetasol propionate (CLOBEX) 0.05 % external shampoo APPLY TO DRY SCALP, LEAVE ON 10 MINS THEN LATHER & RINSE*DO EVERY OTHER DAY ROTATE W/KETOCONAZOLE 118 mL 0     desogestrel-ethinyl estradiol (JOSS, VELIVET) 0.1/0.125/0.15 -0.025 MG per tablet Take 1 tablet by mouth daily       Fluocinolone Acetonide Scalp 0.01 % OIL oil Apply 1-2 caps full to scalp for 4 hours, preferably overnight, then shampoo. Apply 1-2 times weekly. 118 mL 1     ibuprofen (ADVIL/MOTRIN) 200 MG tablet Take 200 mg by mouth as needed       ketoconazole (NIZORAL) 2 % external shampoo APPLY & LATHER ONTO DAMP SCALP & LEAVE ON 3-5MIN FOR 3 TIMES WEEKLY 120 mL 8     spironolactone (ALDACTONE) 25 MG tablet Take 2 tablets (50 mg) by mouth daily 90 tablet 1     triamcinolone (KENALOG) 0.1 % ointment Apply topically 2 times daily To ears for 2-3 weeks,  then a few times per week for maintenance. 80 g 3     clobetasol (TEMOVATE) 0.05 % external solution Apply to affected areas of scalp (above ears) once daily. (Patient not taking: Reported on 12/18/2017) 50 mL 3     Allergies   Allergen Reactions     Fexofenadine Other (See Comments)     Bloody nose  Other reaction(s): Bloody Nose         Review of Systems:  -Constitutional: Otherwise feeling well today, in usual state of health.  -Skin: As above in HPI. No additional skin concerns.    Physical exam:  Vitals: /87   Pulse 114   GEN: This is a well developed, well-nourished female in no acute distress, in a pleasant mood.    SKIN: Focused examination of the scalp, hands  and face was performed.  - Hair density is improved at the bilateral temples (right > left) compared to previous photos  - stable density of frontal hair line compared to previous photos  - Part Width: 1.1  - Regrowth Layers:   First: robust 1-2 cm   Second: robust 3-4 cm   Third: 6-7 cm   Fourth: scattered 8-12 cm  - hair pull test equivocal  - scalp overall healthy appearing, still slightly seborrheic feeling  - nails without abnormality  - eyebrows and eyelashes of normal density  - There is a well healing surgical scar on the left cheek.  - No other lesions of concern on areas examined.       Impression/Plan:  1. Non-scarring alopecia: presently favoring female pattern hair loss; previously ddx with psoriatic alopecia vs AA. Slightly improved density along temples, stable along frontal hairline.     Reviewed labs from 08/26/2019 (CBC w/diff, iron studies, ferritin, TSH, vitamin d and total protein - all WNL)    Increase spironolactone to 100 mg daily.    Continue alternating use of 2% ketoconazole and clobetasol shampoo.    Continue with Rogaine 5% foam daily    Continue with laser comb three times weekly    Continue with daily iron (324 mg), multivitamin, biotin     Repeat iron panel at next visit    Discussed PRP with patient. She is  potentially interested in the future but wants to try spironolactone for a longer period of time.    2. Seborrheic Dermatitis    Continue Derma-Smoothe/FS (fluocinolone acetonide) oil application. Apply to entire scalp 2x weekly.      Follow-up in 4 months, earlier for new or changing lesions.     Dr. Huston staffed this patient.    Staff Involved:  Resident (Leia)/Scribe/Staff    Scribe Disclosure  I, Jayda Ludivina, am serving as a scribe to document services personally performed by Dr. Velvet Huston MD, based on data collection and the provider's statements to me.   Provider Disclosure:   I agree with above History, Review of Systems, Physical exam and Plan. I have reviewed the content of the documentation and have edited it as needed. I have personally performed the services documented here and the documentation accurately represents those services and the decisions I have made.  Ms. Vyas was also seen with the med derm resident, Dr. Dupree.    Velvet Huston MD  Professor and Chair  Department of Dermatology  [facility]

## 2019-12-16 NOTE — PATIENT INSTRUCTIONS
Increase spironolactone to 100 mg daily  Increase fluocinolone oil to 2x/week  Continue all other treatments as your are    Return to clinic in 4 months

## 2019-12-16 NOTE — LETTER
12/16/2019       RE: Daily Vyas  [address]  [facility] [address]     Dear Colleague,    Thank you for referring your patient, Daily Vyas, to the [facility] DERMATOLOGY at [facility]. Please see a copy of my visit note below.    [facility] Dermatology Note      Dermatology Problem List:  1. 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 alopecia areata, initially did not have any evidence of psoriasis elsewhere on body but rash on bilateral ears noted 5/7/18    - ILK 10 mg/cc 4/2017 and 5/2017 without much improvement.  - Current Tx: alternate clobetasol 0.05% shampoo and ketoconazole 2% shampoo daily, laser comb TIW, Rogaine 5% foam daily, daily multivitamin, spironolactone 100 mg daily    2. Seborrheic Dermatitis  - Current Tx: derma-smoothe/fs oil 1-2x weekly     3. History of dermatitis, behind ears  - s/p 0.1% Triamcinolone ointment    4. History of low iron stores  - WNL 8/26/2019    Encounter Date: Dec 16, 2019    CC:  Chief Complaint   Patient presents with     Derm Problem     Non-scarring hair loss - no change since starting the spironolactone.         History of Present Illness:  Ms. Daily Vyas is a 24 year old female who presents as a follow-up for non-scarring hair loss. She was last seen on 08/26/2019 when she was continued with alternating ketoconazole and clobetasol shampoos, Rogaine 5% foam daily, Derma-Smoothe oil 1-2x weekly, and LLLT 3x weekly. On 9/22/2019 she was started on 50 mg spironolactone daily after her labs were checked on 08/26/2019. Today she reports she is not seeing any improvement since starting spironolactone and is interested in increasing her dose. She is still noticing hair shedding on her shirts and pillows. She notices it the most on her temples. She is currently taking 50 mg  spironolactone daily, using Rogaine 5% foam once a day, ketoconazole 2% shampoo every other day, clobetasol shampoo every other day, and laser comb three times weekly. She reports she previously stopped ILK injections because she was not seeing improvement. She reports she is tolerating the spironolactone well and enies menstrual irregularities, breast tenderness, or excessive urination. She had a cyst on her left cheek removed this weekend at [facility] and has a bandage over it. The pathology has not returned yet on what type of cyst was removed. No other concerns.    Past Medical History:   Patient Active Problem List   Diagnosis     Alopecia     Dermatitis, seborrheic     Loss of hair     Low iron stores     Past Medical History:   Diagnosis Date     Eczema      Nickel allergy     identified by allergy to belt buckle     Uncomplicated asthma      No past surgical history on file.    Social History:  Patient reports that she has never smoked. She has never used smokeless tobacco. Patient started eating a vegan diet mid-2019. Patient is a  for a staffing agency.    Family History:  No family history of skin cancer.    Medications:  Current Outpatient Medications   Medication Sig Dispense Refill     clobetasol propionate (CLOBEX) 0.05 % external shampoo APPLY TO DRY SCALP, LEAVE ON 10 MINS THEN LATHER & RINSE*DO EVERY OTHER DAY ROTATE W/KETOCONAZOLE 118 mL 0     clobetasol propionate (CLOBEX) 0.05 % external shampoo APPLY TO DRY SCALP, LEAVE ON 10 MINS THEN LATHER & RINSE*DO EVERY OTHER DAY ROTATE W/KETOCONAZOLE 118 mL 0     desogestrel-ethinyl estradiol (JOSS, VELIVET) 0.1/0.125/0.15 -0.025 MG per tablet Take 1 tablet by mouth daily       Fluocinolone Acetonide Scalp 0.01 % OIL oil Apply 1-2 caps full to scalp for 4 hours, preferably overnight, then shampoo. Apply 1-2 times weekly. 118 mL 1     ibuprofen (ADVIL/MOTRIN) 200 MG tablet Take 200 mg by mouth as needed       ketoconazole (NIZORAL)  2 % external shampoo APPLY & LATHER ONTO DAMP SCALP & LEAVE ON 3-5MIN FOR 3 TIMES WEEKLY 120 mL 8     spironolactone (ALDACTONE) 25 MG tablet Take 2 tablets (50 mg) by mouth daily 90 tablet 1     triamcinolone (KENALOG) 0.1 % ointment Apply topically 2 times daily To ears for 2-3 weeks, then a few times per week for maintenance. 80 g 3     clobetasol (TEMOVATE) 0.05 % external solution Apply to affected areas of scalp (above ears) once daily. (Patient not taking: Reported on 12/18/2017) 50 mL 3     Allergies   Allergen Reactions     Fexofenadine Other (See Comments)     Bloody nose  Other reaction(s): Bloody Nose         Review of Systems:  -Constitutional: Otherwise feeling well today, in usual state of health.  -Skin: As above in HPI. No additional skin concerns.    Physical exam:  Vitals: /87   Pulse 114   GEN: This is a well developed, well-nourished female in no acute distress, in a pleasant mood.    SKIN: Focused examination of the scalp, hands  and face was performed.  - Hair density is improved at the bilateral temples (right > left) compared to previous photos  - stable density of frontal hair line compared to previous photos  - Part Width: 1.1  - Regrowth Layers:   First: robust 1-2 cm   Second: robust 3-4 cm   Third: 6-7 cm   Fourth: scattered 8-12 cm  - hair pull test equivocal  - scalp overall healthy appearing, still slightly seborrheic feeling  - nails without abnormality  - eyebrows and eyelashes of normal density  - There is a well healing surgical scar on the left cheek.  - No other lesions of concern on areas examined.       Impression/Plan:  1. Non-scarring alopecia: presently favoring female pattern hair loss; previously ddx with psoriatic alopecia vs AA. Slightly improved density along temples, stable along frontal hairline.     Reviewed labs from 08/26/2019 (CBC w/diff, iron studies, ferritin, TSH, vitamin d and total protein - all WNL)    Increase spironolactone to 100 mg  daily.    Continue alternating use of 2% ketoconazole and clobetasol shampoo.    Continue with Rogaine 5% foam daily    Continue with laser comb three times weekly    Continue with daily iron (324 mg), multivitamin, biotin     Repeat iron panel at next visit    Discussed PRP with patient. She is potentially interested in the future but wants to try spironolactone for a longer period of time.    2. Seborrheic Dermatitis    Continue Derma-Smoothe/FS (fluocinolone acetonide) oil application. Apply to entire scalp 2x weekly.      Follow-up in 4 months, earlier for new or changing lesions.     Dr. Huston staffed this patient.    Staff Involved:  Resident (Leia)/Scribe/Staff    Scribe Disclosure  I, Jayda Florence, am serving as a scribe to document services personally performed by Dr. Velvet Huston MD, based on data collection and the provider's statements to me.   Provider Disclosure:   I agree with above History, Review of Systems, Physical exam and Plan. I have reviewed the content of the documentation and have edited it as needed. I have personally performed the services documented here and the documentation accurately represents those services and the decisions I have made.  Ms. Vyas was also seen with the med derm resident, Dr. Dupree.    Velvet Huston MD  Professor and Chair  Department of Dermatology  [facility]

## 2019-12-19 DIAGNOSIS — L65.9 ALOPECIA: ICD-10-CM

## 2019-12-19 DIAGNOSIS — L65.9 LOSS OF HAIR: ICD-10-CM

## 2019-12-20 RX ORDER — SPIRONOLACTONE 25 MG/1
TABLET ORAL
Qty: 60 TABLET | Refills: 2 | OUTPATIENT
Start: 2019-12-20

## 2020-02-13 ENCOUNTER — MYC REFILL (OUTPATIENT)
Dept: DERMATOLOGY | Facility: CLINIC | Age: 25
End: 2020-02-13

## 2020-02-13 DIAGNOSIS — L30.9 DERMATITIS: ICD-10-CM

## 2020-02-13 RX ORDER — CLOBETASOL PROPIONATE 0.05 G/100ML
SHAMPOO TOPICAL
Qty: 118 ML | Refills: 0 | Status: SHIPPED | OUTPATIENT
Start: 2020-02-13 | End: 2021-11-01

## 2020-02-13 NOTE — TELEPHONE ENCOUNTER
Last Clinic Visit: 12/16/2019 [facility]  Derm Protocol #3 (  ml:0RF)  NCV: 4-20-20

## 2020-03-10 ENCOUNTER — HEALTH MAINTENANCE LETTER (OUTPATIENT)
Age: 25
End: 2020-03-10

## 2020-03-12 DIAGNOSIS — L65.9 ALOPECIA: ICD-10-CM

## 2020-03-12 DIAGNOSIS — L65.9 LOSS OF HAIR: ICD-10-CM

## 2020-03-14 ENCOUNTER — COMMUNICATION - HEALTHEAST (OUTPATIENT)
Dept: SCHEDULING | Facility: CLINIC | Age: 25
End: 2020-03-14

## 2020-03-14 ENCOUNTER — VIRTUAL VISIT (OUTPATIENT)
Dept: FAMILY MEDICINE | Facility: OTHER | Age: 25
End: 2020-03-14

## 2020-03-14 NOTE — TELEPHONE ENCOUNTER
"  spironolactone (ALDACTONE) 50 MG tablet   Last Written Prescription Date:  12/16/19  Last Fill Quantity: 60,   # refills: 2  Last Office Visit : 12/16/19  Future Office visit: 4/20/20    \"Increase spironolactone to 100 mg daily\"    Process 2    Routing refill request to provider for review/approval because:      Diuretics (Including Combos) Protocol Failed   Normal serum creatinine on file in past 12 months Protocol Details    Normal serum potassium on file in past 12 months     Normal serum sodium on file in past 12 months          "

## 2020-03-15 ENCOUNTER — OFFICE VISIT (OUTPATIENT)
Dept: URGENT CARE | Facility: URGENT CARE | Age: 25
End: 2020-03-15
Payer: COMMERCIAL

## 2020-03-15 DIAGNOSIS — J11.1 INFLUENZA-LIKE ILLNESS: Primary | ICD-10-CM

## 2020-03-15 LAB
FLUAV+FLUBV AG SPEC QL: NEGATIVE
FLUAV+FLUBV AG SPEC QL: NEGATIVE
SPECIMEN SOURCE: NORMAL

## 2020-03-15 PROCEDURE — 99213 OFFICE O/P EST LOW 20 MIN: CPT | Performed by: PHYSICIAN ASSISTANT

## 2020-03-15 PROCEDURE — 87804 INFLUENZA ASSAY W/OPTIC: CPT | Performed by: PHYSICIAN ASSISTANT

## 2020-03-15 RX ORDER — SPIRONOLACTONE 50 MG/1
100 TABLET, FILM COATED ORAL DAILY
Qty: 60 TABLET | Refills: 2 | Status: SHIPPED | OUTPATIENT
Start: 2020-03-15 | End: 2020-06-19

## 2020-03-15 NOTE — PROGRESS NOTES
"Date: 2020 23:28:25  Clinician: Bertha Weinstein  Clinician NPI: [id number]  Patient: Daily Vyas  Patient : 1995  Patient Address: [address] [place] [address]  Patient Phone: [phone]  Visit Protocol: URI  Patient Summary:  Daily is a 24 year old ( : 1995 ) female who initiated a Visit for COVID-19 (Coronavirus) evaluation and screening. When asked the question \"Please sign me up to receive news, health information and promotions. \", Daily responded \"No\".    Daily states her symptoms started gradually 3-6 days ago.   Her symptoms consist of malaise, a sore throat, a cough, and nasal congestion. She is experiencing difficulty breathing due to nasal congestion but she is not short of breath.   Symptom details     Nasal secretions: The color of her mucus is clear.    Cough: Daily coughs a few times an hour and her cough is more bothersome at night. Phlegm does not come into her throat when she coughs. She does not believe her cough is caused by post-nasal drip.     Sore throat: Daily reports having mild throat pain (1-3 on a 10 point pain scale), does not have exudate on her tonsils, and can swallow liquids. She is not sure if the lymph nodes in her neck are enlarged. A rash has not appeared on the skin since the sore throat started.      Daily denies having ear pain, rhinitis, facial pain or pressure, myalgias, chills, wheezing, teeth pain, fever, and headache. She also denies double sickening (worsening symptoms after initial improvement), taking antibiotic medication for the symptoms, and having recent facial or sinus surgery in the past 60 days.   Precipitating events  Within the past week, Daily has not been exposed to someone with strep throat. She has not recently been exposed to someone with influenza. Daily has not been in close contact with any high risk individuals.   Pertinent COVID-19 (Coronavirus) information  Daily " has traveled internationally or to the areas where COVID-19 (Coronavirus) is widespread in the last 14 days before the start of her symptoms. Countries or locations traveled as reported by the patient (free text): [place], [place], [place]   Daily has not had close contact with a suspected or laboratory-confirmed COVID-19 patient within 14 days of symptom onset.   Daily is not a healthcare worker and does not work in a healthcare facility.   Pertinent medical history  Daily does not get yeast infections when she takes antibiotics.   Daily needs a return to work/school note.   Weight: 200 lbs   Daily does not smoke or use smokeless tobacco.   She denies pregnancy and denies breastfeeding. She has menstruated in the past month.   Additional information as reported by the patient (free text): I've had a sore throat off and on the past  6 days. I also have a mild cough. I returned from Travel in [place], [place] and [place] on 3/10 and with the outbreak happening there, was advised through [facility] that I should be swabbed.   Weight: 200 lbs    MEDICATIONS: spironolactone oral, Velivet Triphasic Regimen (28) oral, ALLERGIES: NKDA  Clinician Response:  Dear Daily,    Diagnosis: Cough  Diagnosis ICD: R05

## 2020-03-15 NOTE — PROGRESS NOTES
"Date: 2020 21:01:10  Clinician: Jt Vieyra  Clinician NPI: [id number]  Patient: Daily Vyas  Patient : 1995  Patient Address: [address] [place] [address]  Patient Phone: [phone]  Visit Protocol: URI  Patient Summary:  Daily is a 24 year old ( : 1995 ) female who initiated a Visit for COVID-19 (Coronavirus) evaluation and screening. When asked the question \"Please sign me up to receive news, health information and promotions. \", Daily responded \"No\".    Daily states her symptoms started gradually 3-6 days ago.   Her symptoms consist of rhinitis, a sore throat, a cough, and nasal congestion.   Symptom details     Nasal secretions: The color of her mucus is clear.    Cough: Daily coughs a few times an hour and her cough is not more bothersome at night. Phlegm does not come into her throat when she coughs. She does not believe her cough is caused by post-nasal drip.     Sore throat: Daily reports having mild throat pain (1-3 on a 10 point pain scale), does not have exudate on her tonsils, and can swallow liquids. She is not sure if the lymph nodes in her neck are enlarged. A rash has not appeared on the skin since the sore throat started.      Daily denies having ear pain, malaise, facial pain or pressure, myalgias, chills, wheezing, teeth pain, fever, and headache. She also denies double sickening (worsening symptoms after initial improvement), taking antibiotic medication for the symptoms, and having recent facial or sinus surgery in the past 60 days. She is not experiencing dyspnea.   Precipitating events  Within the past week, Daily has not been exposed to someone with strep throat. She has not recently been exposed to someone with influenza. Daily has not been in close contact with any high risk individuals.   Pertinent COVID-19 (Coronavirus) information  Daily has traveled internationally or to the areas where COVID-19 " (Coronavirus) is widespread in the last 14 days before the start of her symptoms. Countries or locations traveled as reported by the patient (free text): [place], [place], [place]   Daily has not had close contact with a suspected or laboratory-confirmed COVID-19 patient within 14 days of symptom onset.   Daily is not a healthcare worker and does not work in a healthcare facility.   Pertinent medical history  Daily does not get yeast infections when she takes antibiotics.   Daily does not need a return to work/school note.   Weight: 200 lbs   Daily does not smoke or use smokeless tobacco.   She denies pregnancy and denies breastfeeding. She has menstruated in the past month.   Additional information as reported by the patient (free text): No fever, just a slight cough and slight sore throat   Weight: 200 lbs    MEDICATIONS: No current medications, ALLERGIES: NKDA  Clinician Response:  Dear Daily,   COVID-19 (Coronavirus) General Information  With the increase in the number of COVID-19 (Coronavirus) cases, we understand you may have some questions. Below is some helpful information on COVID-19 (Coronavirus).  How can I protect myself and others from the COVID-19 (Coronavirus)?  Because there is currently no vaccine to prevent infection, the best way to protect yourself is to avoid being exposed to this virus. Put distance between yourself and other people if COVID-19 (Coronavirus) is spreading in your community. The virus is thought to spread mainly from person-to-person.     Between people who are in close contact with one another (within about 6 about) for prolonged period (10 minutes or longer).    Through respiratory droplets produced when an infected person coughs or sneezes.     The CDC recommends the following additional steps to protect yourself and others:     Wash your hands often with soap and water for at least 20 seconds, especially after blowing your nose, coughing, or sneezing; going to  the bathroom; and before eating or preparing food.  Use an alcohol-based hand  that contains at least 60 percent alcohol if soap and water are not available.        Avoid touching your eyes, nose and mouth with unwashed hands.    Avoid close contact with people who are sick.    Stay home when you are sick.    Cover your cough or sneeze with a tissue, then throw the tissue in the trash.    Clean and disinfect frequently touched objects and surfaces.     You can help stop COVID-19 (Coronavirus) by knowing the signs and symptoms:     Fever    Cough    Shortness of breath     Contact your healthcare provider if   Develop symptoms   AND   Have been in close contact with a person known to have COVID-19 (Coronavirus) or live in or have recently traveled from an area with ongoing spread of COVID-19 (Coronavirus). Call ahead before you go to a doctor's office or emergency room. Tell them about your recent travel and your symptoms.   For the most up to date information, visit the CDC's website.  Steps to help prevent the spread of COVID-19 (Coronavirus) if you are sick  If you are sick with COVID-19 (Coronavirus) or suspect you are infected with the virus that causes COVID-19 (Coronavirus), follow the steps below to help prevent the disease from spreading&nbsp;to people in your home and community.     Stay home except to get medical care. Home isolation may be started in consultation with your healthcare clinician.    Separate yourself from other people and animals in your home.    Call ahead before visiting your doctor if you have a medical appointment.    Wear a facemask when you are around other people.    Cover your cough and sneezes.    Clean your hands often.    Avoid sharing personal household items.    Clean and disinfect frequently touched objects and surfaces everyday.    You will need to have someone drop off medications or household supplies (if needed) at your house without coming inside or in contact  "with you or others living in your house.    Monitor your symptoms and seek prompt medical care if your illness is worsening (e.g. Difficulty breathing).    Discontinue home isolation only in consultation with your healthcare provider.     For more detailed and up to date information on what to do if you are sick, visit this link: What to Do If You Are Sick With Coronavirus Disease 2019 (COVID-19).  Do I need to be tested for COVID-19 (Coronavirus)?     At this time, the limited number of tests available are controlled by the state and local health departments and are being reserved for more seriously ill patients, those with known exposure to confirmed patients, and those with recent travel (within 14 days) to countries with high rates of COVID-19 (Coronavirus).    Decisions on which patients receive testing will be based on the local spread of COVID-19 (Coronavirus) as well as the symptoms. Your healthcare provider will make the final decision on whether you should be tested.    In the meantime, if you have concerns that you may have been exposed, it is reasonable to practice \"social distancing.\"&nbsp; If you are ill with a cold or flu-like illness, please monitor your symptoms and reach out to your healthcare provider if your symptoms worsen.    For more up to date information, visit this link: COVID-19 (Coronavirus) Frequently Asked Questions and Answers.      Diagnosis: Cough  Diagnosis ICD: R05  "

## 2020-03-15 NOTE — PROGRESS NOTES
Chief Complaint:    COVID-19 evaluation    HPI: Daily Vyas is an 24 year old female who has been triaged via [facility] for possible COVID-19 testing.  Patient was not examined in clinic today.      Patient remained in their car during collection process.  Droplet precautions + contact precautions + eye protection were in effect during collection process.  PPE included gown, surgical mask, face shield, and gloves.    Current Symptoms    Onset of symptoms: 03/11/2020    dry cough, nasal congestion, sore throat and myalgias    Travel Hx    International: Any international travel is considered a risk factor for exposure to SARS-CoV-2. At present, highest disease activity is observed in [place], [place], [place], and [place]; very high rates of infection are being reported in all of [place].       National: Areas with high COVID-19 incidence in the  include the regions below. Travel to these areas is considered a risk factor for COVID-19. ? [place] ([place]) ? [place] ([place], [facility] areas) ? [place] ([facility]) ? [place] ([place]) ? [place] ? [place]     Has patient traveled to any of these locations in the last 14 days.  Yes    Date of travel 3 weeks ago  Locations visited [place], [place], [place]    Exposure HX    Patient has not had direct contact with anyone who tested positive for COVID-19     Medical Decision Making:    Patient does meet criteria for COVID testing.       PLAN:     Results for orders placed or performed in visit on 03/15/20   Influenza A & B Antigen     Status: None   Result Value Ref Range    Influenza A/B Agn Specimen Nasal     Influenza A Negative NEG^Negative    Influenza B Negative NEG^Negative       Covid-19 NP and OP swabs collected.  These will be sent to the MD by lab staff.  Patient advised to stay home with mild symptoms and follow home care symptom management.     Instructions Given to Patient  It is recommended that you stay home with mild symptoms.  To do this  follow these instructions:    Isolate Yourself:    Isolate yourself at home.     Do Not allow any visitors    Do Not go to work or school    Do Not go to [employer],  centers, shopping, or other public places.    Do Not shake hands.    Avoid close contact with others (hugging, kissing).    Protect Others:    Cover Your Mouth and Nose with a mask, disposable tissue or wash cloth to avoid spreading germs to others.    Wash your hands and face frequently with soap and water.    Fever Medicines:    For fever relief, take acetaminophen or ibuprofen.    Treat fevers above 101  F (38.3  C) to lower fevers and make you more comfortable.     Acetaminophen (e.g., Tylenol): Take 650 mg (two 325 mg pills) by mouth every 4-6 hours as needed of regular strength Tyleno or 1,000 mg (two 500 mg pills) every 8 hours as needed of Extra Strength Tylenol.     Ibuprofen (e.g., Motrin, Advil): Take 400 mg (two 200 mg pills) by mouth every 6 hours as needed.     Acetaminophen is thought to be safer than ibuprofen or naproxen for people over 65 years old. Acetaminophen is in many OTC and prescription medicines. It might be in more than one medicine that you are taking. You need to be careful and not take an overdose. Before taking any medicine, read all the instructions on the package.    Caution -NSAIDs (e.g., ibuprofen, naproxen): Do not take nonsteroidal anti-inflammatory drugs (NSAIDs) if you have stomach problems, kidney disease, heart failure, or other contraindications to using this type of medicine. Do not take NSAID medicines for over 7 days without consulting your PCP. Do not take NSAID medicines if you are pregnant. Do not take NSAID medicines if you are also taking blood thinners.     Call Back If: Breathing difficulty develops or you become worse.    Thank you for limiting contact with others, wearing a simple mask to cover your cough, practice good hand hygiene habits and accessing our virtual services where possible to  limit the spread of this virus.    For more information about COVID19 and options for caring for yourself at home, please visit the CDC website at https://www.cdc.gov/coronavirus/2019-ncov/about/steps-when-sick.html  For more options for care at [facility], please visit our website at [email or web address]        Terry Manley PA-C 03/15/209:28 AM

## 2020-03-15 NOTE — PATIENT INSTRUCTIONS
You are being tested for Corona Virus 19, Influenza and possibly RSV.    Please use the information at the end of this document to sign up for [facility] where you can get your results and a message about those results sent to you through the [employer] application. If you do not have mychart we will call you with your results but it may take longer.    Isolate Yourself:    Isolate yourself while traveling.    Do Not allow any visitors within 6 feet.    Do Not go to work or school.    Do Not go to [employer],  centers, shopping, or other public places.    Do Not shake hands.    Avoid close contact with others (hugging, kissing).    Protect Others:    Cover Your Mouth and Nose with a mask, disposable tissue or wash cloth to avoid spreading germs to others.    Wash your hands and face frequently with soap and water    Call Back If: Breathing difficulty develops or you become worse.    For more information about COVID19 and options for caring for yourself at home, please visit the CDC website at https://www.cdc.gov/coronavirus/2019-ncov/about/steps-when-sick.html  For more options for care at [facility], please visit our website at [email or web address]

## 2020-03-15 NOTE — TELEPHONE ENCOUNTER
Received refill request as [id number]. Patient chart reviewed. Refill accepted.    Roshni Beebe MD  Dermatology Resident  [facility]

## 2020-03-23 ENCOUNTER — TELEPHONE (OUTPATIENT)
Dept: EMERGENCY MEDICINE | Facility: CLINIC | Age: 25
End: 2020-03-23

## 2020-03-23 NOTE — TELEPHONE ENCOUNTER
Coronavirus (COVID-19) Notification    Reason for call  Notify of Negative COVID-19 lab result, assess symptoms,  review [facility] recommendations    Lab Result    Lab test 2019-nCoV rRt-PCR  Oropharyngeal AND/OR nasopharyngeal swabs were NEGATIVE for 2019-nCoV RNA    RN Recommendations/Instructions per [facility]  Patient notified of Negative COVID-19.    Patient can discontinue Quarantee and is free to resume normal activites.  If Patient has questions that you are not able to answer they can contact PCP or MD hotline ([phone])    Please Contact your PCP clinic or return to the Emergency department if your:    Symptoms worsen or other concerning symptom's.      [RN/LPN Name]  Tammie Severson, LPN

## 2020-03-24 LAB — COVID-19 VIRUS PCR RESULT FROM MDH: NEGATIVE

## 2020-04-13 DIAGNOSIS — L65.9 LOSS OF HAIR: ICD-10-CM

## 2020-04-13 DIAGNOSIS — L65.9 ALOPECIA: ICD-10-CM

## 2020-04-14 RX ORDER — SPIRONOLACTONE 25 MG/1
50 TABLET ORAL DAILY
Qty: 90 TABLET | Refills: 1 | OUTPATIENT
Start: 2020-04-14

## 2020-06-16 DIAGNOSIS — L65.9 LOSS OF HAIR: ICD-10-CM

## 2020-06-16 DIAGNOSIS — L65.9 ALOPECIA: ICD-10-CM

## 2020-06-19 RX ORDER — SPIRONOLACTONE 50 MG/1
100 TABLET, FILM COATED ORAL DAILY
Qty: 60 TABLET | Refills: 1 | Status: SHIPPED | OUTPATIENT
Start: 2020-06-19 | End: 2020-08-18

## 2020-06-19 NOTE — TELEPHONE ENCOUNTER
Last Clinic Visit: 12/16/2019  [facility] Dermatology  Derm Process # 2  NCV: 8-17-20  RF til RTC  Labs: outside [facility] 12-27-19- WNL

## 2020-07-11 ENCOUNTER — MYC REFILL (OUTPATIENT)
Dept: DERMATOLOGY | Facility: CLINIC | Age: 25
End: 2020-07-11

## 2020-07-11 DIAGNOSIS — L21.9 DERMATITIS, SEBORRHEIC: ICD-10-CM

## 2020-07-11 DIAGNOSIS — L63.9 AA (ALOPECIA AREATA): ICD-10-CM

## 2020-07-13 RX ORDER — KETOCONAZOLE 20 MG/ML
SHAMPOO TOPICAL
Qty: 120 ML | Refills: 4 | Status: SHIPPED | OUTPATIENT
Start: 2020-07-13 | End: 2021-11-01

## 2020-08-11 ENCOUNTER — TELEPHONE (OUTPATIENT)
Dept: DERMATOLOGY | Facility: CLINIC | Age: 25
End: 2020-08-11

## 2020-08-11 NOTE — TELEPHONE ENCOUNTER
I called and left Daily KNAPP asking for them to give us a call back in regards to their upcoming appointment . Due to the COVID-19  virus we are reducing the traffic at the [facility] and asking patients do a virtual appointment which is either Video or Telephone. If patient would like to be seen in clinic we can reschedule for 4-5  months. Clinic number provided and notified that they can respond via [employer] if needed.

## 2020-08-12 DIAGNOSIS — L65.9 LOSS OF HAIR: ICD-10-CM

## 2020-08-12 DIAGNOSIS — L65.9 ALOPECIA: ICD-10-CM

## 2020-08-17 ENCOUNTER — VIRTUAL VISIT (OUTPATIENT)
Dept: DERMATOLOGY | Facility: CLINIC | Age: 25
End: 2020-08-17
Payer: COMMERCIAL

## 2020-08-17 DIAGNOSIS — L65.9 LOSS OF HAIR: Primary | ICD-10-CM

## 2020-08-17 DIAGNOSIS — R79.0 LOW IRON STORES: ICD-10-CM

## 2020-08-17 NOTE — PROGRESS NOTES
[facility] Dermatology Record:  Mychart Connected      Dermatology Problem List:  1. 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 alopecia areata, initially did not have any evidence of psoriasis elsewhere on body but rash on bilateral ears noted 5/7/18    - ILK 10 mg/cc 4/2017 and 5/2017 without much improvement.  - Current Tx: alternate clobetasol 0.05% shampoo and ketoconazole 2% shampoo daily, laser comb TIW, Rogaine 5% foam daily, daily multivitamin, increased spironolactone dosage to 150 mg daily (08/17/20).     2. Seborrheic Dermatitis  - Current Tx: derma-smoothe/fs oil 1-2x weekly for the winter months     3. History of dermatitis, behind ears  - s/p 0.1% Triamcinolone ointment (when needed)     4. History of low iron stores  - [facility] 8/26/2019    Encounter Date: Aug 17, 2020    CC:   Chief Complaint   Patient presents with     Derm Problem     Hair loss follow up       History of Present Illness:  Daily Vyas is a 25 year old female who presents for a follow up appointment for non-scarring alopecia consistent with female pattern hair loss. Daily's last visit was on 12/16/2019 when spironolactone was increased to 100 mg daily and she continued all other txs. She is currently taking/using OC estadiol (Yoko, Velivet), ketoconazole shampoo 3x/weekly, clobetasol shampoo every other day rotating with ketoconazole, 100 mg spironolactone daily, and topical Kenalog for her dermatits as needed. Today she reports continued hair shedding, but no itching or pain of the scalp. Patient has been noticing short hairs signaling regrowth.    ROS: Patient is generally feeling well today.    Physical Examination:  General: Well-appearing patient and is an appropriately-developed individual.  Skin: Focused examination including scalp, face and hands was performed.   - Scalp appears healthy  - Part width is  stable from last visit in 12/16/19  - Hair loss on right temple is stable  - Hair loss on left temple is stable  - Hair pull test is negative: 2 hairs on the right side, 1 hair on the left side, 1 from the vertex.  - Eyelashes, eyebrows, and hair on rest of body and nails are stable and always have been    Labs:  Labs ordered from [facility] 8/17/20: Vitamin D, TSH, ferratin, CBC with differential, and CMP.    Past Medical History:   Patient Active Problem List   Diagnosis     Alopecia     Dermatitis, seborrheic     Loss of hair     Low iron stores     Past Medical History:   Diagnosis Date     Eczema      Nickel allergy     identified by allergy to belt buckle     Uncomplicated asthma      No past surgical history on file.    Social History:  Patient reports that she has never smoked. She has never used smokeless tobacco.    Family History:  Family History   Problem Relation Age of Onset     Melanoma No family hx of      Skin Cancer No family hx of        Medications:  Current Outpatient Medications   Medication     clobetasol propionate (CLOBEX) 0.05 % external shampoo     clobetasol propionate (CLOBEX) 0.05 % external shampoo     desogestrel-ethinyl estradiol (JOSS, VELIVET) 0.1/0.125/0.15 -0.025 MG per tablet     Fluocinolone Acetonide Scalp 0.01 % OIL oil     ibuprofen (ADVIL/MOTRIN) 200 MG tablet     ketoconazole (NIZORAL) 2 % external shampoo     spironolactone (ALDACTONE) 50 MG tablet     triamcinolone (KENALOG) 0.1 % ointment     clobetasol (TEMOVATE) 0.05 % external solution     No current facility-administered medications for this visit.           Allergies   Allergen Reactions     Fexofenadine Other (See Comments)     Bloody nose  Other reaction(s): Bloody Nose           Impression and Recommendations (Patient Counseled on the Following):  1. Non-scarring alopecia: presently favoring female pattern hair loss; previously ddx with psoriatic alopecia vs AA. Stable compared to last visit.     Labs pending  (8/17/20) Vitamin D, TSH, ferratin, CBC with differential, and CMP.    Increase spironolactone to 150 mg daily.    Continue alternating use of 2% ketoconazole and clobetasol shampoo.    Continue with Rogaine 5% foam daily    Continue with laser comb three times weekly    Continue with daily iron (324 mg), multivitamin, biotin     Discussed PRP with patient. She is potentially interested in the future but wants to try spironolactone for a longer period of time. Consider oral minoxidil in the future before trying PRP.     2. Seborrheic Dermatitis    Use Derma-Smoothe/FS (fluocinolone acetonide) oil application in the winter. Apply to entire scalp one to two times weekly.        Follow-up:   Follow-up with dermatology in approximately 3-4 weeks in person. Earlier for new or changing lesions or rash.      Staff and scribe    Scribe Disclosure  I, Ro Rogers, am serving as a scribe to document services personally performed by Dr. Velvet Huston MD, based on data collection and the provider's statements to me.     Provider Disclosure:   The documentation recorded by the scribe accurately reflects the services I personally performed and the decisions made by me during this virtual visit.    Velvet Huston MD  Professor and Chair  Department of Dermatology  Formerly named [facility]: Phone: [phone], Fax:[phone]  [facility]: Phone: [phone], Fax: [phone]      _____________________________________________________________________________    Teledermatology information:  - Location of patient: [place]  - Patient presented as: return  - Location of teledermatologist:  ([facility] DERMATOLOGY )  - Reason teledermatology is appropriate:  of National Emergency Regarding Coronavirus disease (COVID 19) Outbreak  - Image quality and interpretability: acceptable  - Physician has received verbal consent for a  Video/Photos Visit from the patient? Yes  - In-person dermatology visit recommendation: yes - for physician visit  - Date of images: N/A  - Service start time:4:30pm  - Service end time:4:50pm  - Date of report: 8/17/2020

## 2020-08-17 NOTE — PATIENT INSTRUCTIONS
Continue with current treatment regimen and raise Spironolactone dosage from 100 mg daily to 150 mg daily.  Follow-up in 3-4 weeks in person for [employer] and in-person exam.  Labs ordered from [facility] (8/17/20): Vitamin D, TSH, ferratin, CBC with differential, and CMP.

## 2020-08-17 NOTE — LETTER
8/17/2020        RE: Daily Vyas  [address]  [place] [address]     Dear Colleague,    Thank you for referring your patient, Daily Vyas, to the [facility] DERMATOLOGY at [facility]. Please see a copy of my visit note below.    [facility] Dermatology Record:  Mychart Connected      Dermatology Problem List:  1. 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 alopecia areata, initially did not have any evidence of psoriasis elsewhere on body but rash on bilateral ears noted 5/7/18    - ILK 10 mg/cc 4/2017 and 5/2017 without much improvement.  - Current Tx: alternate clobetasol 0.05% shampoo and ketoconazole 2% shampoo daily, laser comb TIW, Rogaine 5% foam daily, daily multivitamin, increased spironolactone dosage to 150 mg daily (08/17/20).     2. Seborrheic Dermatitis  - Current Tx: derma-smoothe/fs oil 1-2x weekly for the winter months     3. History of dermatitis, behind ears  - s/p 0.1% Triamcinolone ointment (when needed)     4. History of low iron stores  - [facility] 8/26/2019    Encounter Date: Aug 17, 2020    CC:   Chief Complaint   Patient presents with     Derm Problem     Hair loss follow up       History of Present Illness:  Daily Vyas is a 25 year old female who presents for a follow up appointment for non-scarring alopecia consistent with female pattern hair loss. Daily's last visit was on 12/16/2019 when spironolactone was increased to 100 mg daily and she continued all other txs. She is currently taking/using OC estadiol (Yoko, Velivet), ketoconazole shampoo 3x/weekly, clobetasol shampoo every other day rotating with ketoconazole, 100 mg spironolactone daily, and topical Kenalog for her dermatits as needed. Today she reports continued hair shedding, but no itching or pain of the scalp. Patient has been noticing short hairs  signaling regrowth.    ROS: Patient is generally feeling well today.    Physical Examination:  General: Well-appearing patient and is an appropriately-developed individual.  Skin: Focused examination including scalp, face and hands was performed.   - Scalp appears healthy  - Part width is stable from last visit in 12/16/19  - Hair loss on right temple is stable  - Hair loss on left temple is stable  - Hair pull test is negative: 2 hairs on the right side, 1 hair on the left side, 1 from the vertex.  - Eyelashes, eyebrows, and hair on rest of body and nails are stable and always have been    Labs:  Labs ordered from [facility] 8/17/20: Vitamin D, TSH, ferratin, CBC with differential, and CMP.    Past Medical History:   Patient Active Problem List   Diagnosis     Alopecia     Dermatitis, seborrheic     Loss of hair     Low iron stores     Past Medical History:   Diagnosis Date     Eczema      Nickel allergy     identified by allergy to belt buckle     Uncomplicated asthma      No past surgical history on file.    Social History:  Patient reports that she has never smoked. She has never used smokeless tobacco.    Family History:  Family History   Problem Relation Age of Onset     Melanoma No family hx of      Skin Cancer No family hx of        Medications:  Current Outpatient Medications   Medication     clobetasol propionate (CLOBEX) 0.05 % external shampoo     clobetasol propionate (CLOBEX) 0.05 % external shampoo     desogestrel-ethinyl estradiol (JOSS, VELIVET) 0.1/0.125/0.15 -0.025 MG per tablet     Fluocinolone Acetonide Scalp 0.01 % OIL oil     ibuprofen (ADVIL/MOTRIN) 200 MG tablet     ketoconazole (NIZORAL) 2 % external shampoo     spironolactone (ALDACTONE) 50 MG tablet     triamcinolone (KENALOG) 0.1 % ointment     clobetasol (TEMOVATE) 0.05 % external solution     No current facility-administered medications for this visit.           Allergies   Allergen Reactions     Fexofenadine Other (See Comments)      Bloody nose  Other reaction(s): Bloody Nose           Impression and Recommendations (Patient Counseled on the Following):  1. Non-scarring alopecia: presently favoring female pattern hair loss; previously ddx with psoriatic alopecia vs AA. Stable compared to last visit.     Labs pending (8/17/20) Vitamin D, TSH, ferratin, CBC with differential, and CMP.    Increase spironolactone to 150 mg daily.    Continue alternating use of 2% ketoconazole and clobetasol shampoo.    Continue with Rogaine 5% foam daily    Continue with laser comb three times weekly    Continue with daily iron (324 mg), multivitamin, biotin     Discussed PRP with patient. She is potentially interested in the future but wants to try spironolactone for a longer period of time. Consider oral minoxidil in the future before trying PRP.     2. Seborrheic Dermatitis    Use Derma-Smoothe/FS (fluocinolone acetonide) oil application in the winter. Apply to entire scalp one to two times weekly.        Follow-up:   Follow-up with dermatology in approximately 3-4 weeks in person. Earlier for new or changing lesions or rash.      Staff and scribe    Scribe Disclosure  I, Ro Rogers, am serving as a scribe to document services personally performed by Dr. Velvet Huston MD, based on data collection and the provider's statements to me.     Provider Disclosure:   The documentation recorded by the scribe accurately reflects the services I personally performed and the decisions made by me during this virtual visit.    Velvet Huston MD  Professor and Chair  Department of Dermatology  [facility]: Phone: [phone], Fax:[phone]  [facility]: Phone: [phone], Fax: [phone]      _____________________________________________________________________________    Teledermatology information:  - Location of patient: [place]  - Patient presented as:  return  - Location of teledermatologist:  ([facility] DERMATOLOGY )  - Reason teledermatology is appropriate:  of National Emergency Regarding Coronavirus disease (COVID 19) Outbreak  - Image quality and interpretability: acceptable  - Physician has received verbal consent for a Video/Photos Visit from the patient? Yes  - In-person dermatology visit recommendation: yes - for physician visit  - Date of images: N/A  - Service start time:4:30pm  - Service end time:4:50pm  - Date of report: 8/17/2020     Again, thank you for allowing me to participate in the care of your patient.      Sincerely,    Velvet Huston MD

## 2020-08-18 RX ORDER — SPIRONOLACTONE 50 MG/1
150 TABLET, FILM COATED ORAL DAILY
Qty: 270 TABLET | Refills: 3 | Status: SHIPPED | OUTPATIENT
Start: 2020-08-18 | End: 2023-08-14

## 2020-08-18 NOTE — TELEPHONE ENCOUNTER
spironolactone (ALDACTONE) 50 MG tablet       Last Written Prescription Date:  6/19/2020  Last Fill Quantity: 60,   # refills: 1  Last Office Visit : 8/18/2020  Future Office visit:  none    Routing refill request to provider for review/approval because:  Refill needs review- Dose change  - last filled as 100 mg QD  - per 8/18/2020 Dermatology visit, dose increased 150 mg QD.  *pended Rx per new plan.

## 2020-08-18 NOTE — TELEPHONE ENCOUNTER
Received refill request for spironolactone. Patient chart reviewed. Refill accepted. Rx routed to Dr. Huston.    Roshni Beebe MD  Dermatology Resident  [facility]

## 2020-08-31 ENCOUNTER — MYC MEDICAL ADVICE (OUTPATIENT)
Dept: DERMATOLOGY | Facility: CLINIC | Age: 25
End: 2020-08-31

## 2020-08-31 DIAGNOSIS — L65.9 ALOPECIA: Primary | ICD-10-CM

## 2020-09-01 NOTE — TELEPHONE ENCOUNTER
Labs have not been ordered. See LOV note copied below. Will route to [facility] for assistance.    Impression and Recommendations (Patient Counseled on the Following):  1. Non-scarring alopecia: presently favoring female pattern hair loss; previously ddx with psoriatic alopecia vs AA. Stable compared to last visit.     Labs pending (8/17/20) Vitamin D, TSH, ferratin, CBC with differential, and CMP.    Increase spironolactone to 150 mg daily.    Continue alternating use of 2% ketoconazole and clobetasol shampoo.    Continue with Rogaine 5% foam daily    Continue with laser comb three times weekly    Continue with daily iron (324 mg), multivitamin, biotin     Discussed PRP with patient. She is potentially interested in the future but wants to try spironolactone for a longer period of time. Consider oral minoxidil in the future before trying PRP.     2. Seborrheic Dermatitis    Use Derma-Smoothe/FS (fluocinolone acetonide) oil application in the winter. Apply to entire scalp one to two times weekly.            Labs ordered.   Matt Rico MD  Dermatology Resident, PGY-3

## 2020-09-03 DIAGNOSIS — L65.9 ALOPECIA: ICD-10-CM

## 2020-09-03 LAB
BASOPHILS # BLD AUTO: 0 10E9/L (ref 0–0.2)
BASOPHILS NFR BLD AUTO: 0.3 %
DIFFERENTIAL METHOD BLD: NORMAL
EOSINOPHIL # BLD AUTO: 0.2 10E9/L (ref 0–0.7)
EOSINOPHIL NFR BLD AUTO: 3.1 %
ERYTHROCYTE [DISTWIDTH] IN BLOOD BY AUTOMATED COUNT: 13 % (ref 10–15)
HCT VFR BLD AUTO: 42.6 % (ref 35–47)
HGB BLD-MCNC: 14.4 G/DL (ref 11.7–15.7)
LYMPHOCYTES # BLD AUTO: 1.8 10E9/L (ref 0.8–5.3)
LYMPHOCYTES NFR BLD AUTO: 26.5 %
MCH RBC QN AUTO: 29.9 PG (ref 26.5–33)
MCHC RBC AUTO-ENTMCNC: 33.8 G/DL (ref 31.5–36.5)
MCV RBC AUTO: 89 FL (ref 78–100)
MONOCYTES # BLD AUTO: 0.5 10E9/L (ref 0–1.3)
MONOCYTES NFR BLD AUTO: 7.4 %
NEUTROPHILS # BLD AUTO: 4.2 10E9/L (ref 1.6–8.3)
NEUTROPHILS NFR BLD AUTO: 62.7 %
PLATELET # BLD AUTO: 303 10E9/L (ref 150–450)
RBC # BLD AUTO: 4.81 10E12/L (ref 3.8–5.2)
WBC # BLD AUTO: 6.8 10E9/L (ref 4–11)

## 2020-09-03 PROCEDURE — 83540 ASSAY OF IRON: CPT | Performed by: DERMATOLOGY

## 2020-09-03 PROCEDURE — 82306 VITAMIN D 25 HYDROXY: CPT | Performed by: DERMATOLOGY

## 2020-09-03 PROCEDURE — 80050 GENERAL HEALTH PANEL: CPT | Performed by: DERMATOLOGY

## 2020-09-03 PROCEDURE — 36415 COLL VENOUS BLD VENIPUNCTURE: CPT | Performed by: DERMATOLOGY

## 2020-09-03 PROCEDURE — 82728 ASSAY OF FERRITIN: CPT | Performed by: DERMATOLOGY

## 2020-09-03 PROCEDURE — 83550 IRON BINDING TEST: CPT | Performed by: DERMATOLOGY

## 2020-09-04 LAB
ALBUMIN SERPL-MCNC: 4.1 G/DL (ref 3.4–5)
ALP SERPL-CCNC: 74 U/L (ref 40–150)
ALT SERPL W P-5'-P-CCNC: 26 U/L (ref 0–50)
ANION GAP SERPL CALCULATED.3IONS-SCNC: 10 MMOL/L (ref 3–14)
AST SERPL W P-5'-P-CCNC: 17 U/L (ref 0–45)
BILIRUB SERPL-MCNC: 0.4 MG/DL (ref 0.2–1.3)
BUN SERPL-MCNC: 7 MG/DL (ref 7–30)
CALCIUM SERPL-MCNC: 9.9 MG/DL (ref 8.5–10.1)
CHLORIDE SERPL-SCNC: 106 MMOL/L (ref 94–109)
CO2 SERPL-SCNC: 21 MMOL/L (ref 20–32)
CREAT SERPL-MCNC: 0.67 MG/DL (ref 0.52–1.04)
DEPRECATED CALCIDIOL+CALCIFEROL SERPL-MC: 35 UG/L (ref 20–75)
FERRITIN SERPL-MCNC: 80 NG/ML (ref 12–150)
GFR SERPL CREATININE-BSD FRML MDRD: >90 ML/MIN/{1.73_M2}
GLUCOSE SERPL-MCNC: 90 MG/DL (ref 70–99)
IRON SATN MFR SERPL: 29 % (ref 15–46)
IRON SERPL-MCNC: 102 UG/DL (ref 35–180)
POTASSIUM SERPL-SCNC: 3.9 MMOL/L (ref 3.4–5.3)
PROT SERPL-MCNC: 7.9 G/DL (ref 6.8–8.8)
SODIUM SERPL-SCNC: 137 MMOL/L (ref 133–144)
TIBC SERPL-MCNC: 348 UG/DL (ref 240–430)
TSH SERPL DL<=0.005 MIU/L-ACNC: 1.57 MU/L (ref 0.4–4)

## 2020-12-27 ENCOUNTER — HEALTH MAINTENANCE LETTER (OUTPATIENT)
Age: 25
End: 2020-12-27

## 2021-04-24 ENCOUNTER — HEALTH MAINTENANCE LETTER (OUTPATIENT)
Age: 26
End: 2021-04-24

## 2021-06-06 NOTE — TELEPHONE ENCOUNTER
Caller c/o cough that started 1 or 2 days (3/8) after arrival to international visit   No fever  Slight sore throat  Some cough    Reason for Disposition    [1] Cough occurs AND [2] within 14 days of CORONAVIRUS EXPOSURE    [facility] Specific Disposition - REQUIRED: [facility] Specific Patient Instructions  COVID 19 Nurse Triage Plan    Patient to be scheduled for a Telephone Visit (if [facility] unavailable or declined). **Follow System Ambulatory Workflow for COVID 19 and route message to appropriate pool location **    Instructions Given to Patient  It is recommended that you setup a virtual visit with one of our virtual providers.  To do this follow these instructions:    1. Go to the website [email or web address]  2. Create an account (you will need your insurance information)  3. Start a new visit  4. Choose your diagnosis (e.g. COVID19)  5. Fill out the information about your symptoms  6. A provider will reach out to you by text, phone call or video visit based on your request    While you are at home please follow these instructions to care for yourself:    Isolate Yourself:  Isolate yourself at home.   Do Not allow any visitors  Do Not go to work or school  Do Not go to [employer],  centers, shopping, or other public places.  Do Not shake hands.  Avoid close contact with others (hugging, kissing).    Protect Others:  Cover Your Mouth and Nose with a mask, disposable tissue or wash cloth to avoid spreading germs to others.  Wash your hands and face frequently with soap and water.    Fever Medicines:  For fever relief, take acetaminophen or ibuprofen.  Treat fevers above 101  F (38.3  C) to lower fevers and make you more comfortable.   Acetaminophen (e.g., Tylenol): Take 650 mg (two 325 mg pills) by mouth every 4-6 hours as needed of regular strength Tyleno or 1,000 mg (two 500 mg pills) every 8 hours as needed of Extra Strength Tylenol.   Ibuprofen (e.g., Motrin, Advil): Take 400 mg (two 200  mg pills) by mouth every 6 hours as needed.   Acetaminophen is thought to be safer than ibuprofen or naproxen for people over 65 years old. Acetaminophen is in many OTC and prescription medicines. It might be in more than one medicine that you are taking. You need to be careful and not take an overdose. Before taking any medicine, read all the instructions on the package.  Caution -NSAIDs (e.g., ibuprofen, naproxen): Do not take nonsteroidal anti-inflammatory drugs (NSAIDs) if you have stomach problems, kidney disease, heart failure, or other contraindications to using this type of medicine. Do not take NSAID medicines for over 7 days without consulting your PCP. Do not take NSAID medicines if you are pregnant. Do not take NSAID medicines if you are also taking blood thinners.     Call Back If: Breathing difficulty develops or you become worse.    Thank you for limiting contact with others, wearing a simple mask to cover your cough, practice good hand hygiene habits and accessing our virtual services where possible to limit the spread of this virus.    For more information about COVID19 and options for caring for yourself at home, please visit the CDC website at https://www.cdc.gov/coronavirus/2019-ncov/about/steps-when-sick.html  For more options for care at [facility], please visit our website at [email or web address]    Protocols used: CORONAVIRUS (2019-NCOV) EXPOSURE-ADITI-AH    FYI - RN Triage

## 2021-07-28 DIAGNOSIS — L63.9 AA (ALOPECIA AREATA): ICD-10-CM

## 2021-07-28 DIAGNOSIS — L21.9 DERMATITIS, SEBORRHEIC: ICD-10-CM

## 2021-07-29 RX ORDER — KETOCONAZOLE 20 MG/ML
SHAMPOO TOPICAL
Qty: 120 ML | Refills: 4 | OUTPATIENT
Start: 2021-07-29

## 2021-08-18 ENCOUNTER — TELEPHONE (OUTPATIENT)
Dept: DERMATOLOGY | Facility: CLINIC | Age: 26
End: 2021-08-18

## 2021-08-18 NOTE — TELEPHONE ENCOUNTER
ALICIAM x2 and sent [employer] message.    Patient needs to scheduled an follow up for any medications refills with Dr. Huston next available can also be scheduled in Dr. Ocasio or Dr. Foster clinic next available.    Malaika Carter  Dermatology Clinic Coordinator  8/18/21

## 2021-10-09 ENCOUNTER — HEALTH MAINTENANCE LETTER (OUTPATIENT)
Age: 26
End: 2021-10-09

## 2021-11-01 ENCOUNTER — OFFICE VISIT (OUTPATIENT)
Dept: DERMATOLOGY | Facility: CLINIC | Age: 26
End: 2021-11-01
Payer: COMMERCIAL

## 2021-11-01 ENCOUNTER — LAB (OUTPATIENT)
Dept: LAB | Facility: CLINIC | Age: 26
End: 2021-11-01
Payer: COMMERCIAL

## 2021-11-01 DIAGNOSIS — R79.0 LOW IRON STORES: Primary | ICD-10-CM

## 2021-11-01 DIAGNOSIS — L21.9 DERMATITIS, SEBORRHEIC: ICD-10-CM

## 2021-11-01 DIAGNOSIS — R79.0 LOW IRON STORES: ICD-10-CM

## 2021-11-01 DIAGNOSIS — L65.9 LOSS OF HAIR: ICD-10-CM

## 2021-11-01 DIAGNOSIS — L63.9 AA (ALOPECIA AREATA): ICD-10-CM

## 2021-11-01 LAB
BASOPHILS # BLD AUTO: 0.1 10E3/UL (ref 0–0.2)
BASOPHILS NFR BLD AUTO: 1 %
EOSINOPHIL # BLD AUTO: 0.1 10E3/UL (ref 0–0.7)
EOSINOPHIL NFR BLD AUTO: 1 %
ERYTHROCYTE [DISTWIDTH] IN BLOOD BY AUTOMATED COUNT: 12.5 % (ref 10–15)
FERRITIN SERPL-MCNC: 74 NG/ML (ref 12–150)
HCT VFR BLD AUTO: 42.8 % (ref 35–47)
HGB BLD-MCNC: 14 G/DL (ref 11.7–15.7)
IMM GRANULOCYTES # BLD: 0 10E3/UL
IMM GRANULOCYTES NFR BLD: 0 %
IRON SATN MFR SERPL: 45 % (ref 15–46)
IRON SERPL-MCNC: 142 UG/DL (ref 35–180)
LYMPHOCYTES # BLD AUTO: 1.7 10E3/UL (ref 0.8–5.3)
LYMPHOCYTES NFR BLD AUTO: 19 %
MCH RBC QN AUTO: 29.4 PG (ref 26.5–33)
MCHC RBC AUTO-ENTMCNC: 32.7 G/DL (ref 31.5–36.5)
MCV RBC AUTO: 90 FL (ref 78–100)
MONOCYTES # BLD AUTO: 0.6 10E3/UL (ref 0–1.3)
MONOCYTES NFR BLD AUTO: 7 %
NEUTROPHILS # BLD AUTO: 6.6 10E3/UL (ref 1.6–8.3)
NEUTROPHILS NFR BLD AUTO: 72 %
NRBC # BLD AUTO: 0 10E3/UL
NRBC BLD AUTO-RTO: 0 /100
PLATELET # BLD AUTO: 328 10E3/UL (ref 150–450)
RBC # BLD AUTO: 4.77 10E6/UL (ref 3.8–5.2)
TIBC SERPL-MCNC: 317 UG/DL (ref 240–430)
WBC # BLD AUTO: 9 10E3/UL (ref 4–11)

## 2021-11-01 PROCEDURE — 99214 OFFICE O/P EST MOD 30 MIN: CPT | Performed by: DERMATOLOGY

## 2021-11-01 PROCEDURE — 83550 IRON BINDING TEST: CPT | Performed by: PATHOLOGY

## 2021-11-01 PROCEDURE — 85025 COMPLETE CBC W/AUTO DIFF WBC: CPT | Performed by: PATHOLOGY

## 2021-11-01 PROCEDURE — 36415 COLL VENOUS BLD VENIPUNCTURE: CPT | Performed by: PATHOLOGY

## 2021-11-01 PROCEDURE — 82728 ASSAY OF FERRITIN: CPT | Performed by: PATHOLOGY

## 2021-11-01 RX ORDER — FLUOCINOLONE ACETONIDE 0.11 MG/ML
OIL TOPICAL
Qty: 118 ML | Refills: 1 | Status: SHIPPED | OUTPATIENT
Start: 2021-11-01 | End: 2022-11-07

## 2021-11-01 RX ORDER — KETOCONAZOLE 20 MG/ML
SHAMPOO TOPICAL
Qty: 120 ML | Refills: 4 | Status: SHIPPED | OUTPATIENT
Start: 2021-11-01 | End: 2023-08-14

## 2021-11-01 ASSESSMENT — PAIN SCALES - GENERAL: PAINLEVEL: NO PAIN (0)

## 2021-11-01 NOTE — LETTER
11/1/2021       RE: Daily Vyas  [address] [facility] [address]     Dear Colleague,    Thank you for referring your patient, Daily Vyas, to the [facility] DERMATOLOGY CLINIC [place] at [facility]. Please see a copy of my visit note below.    [facility] Dermatology Note  Encounter Date: Nov 1, 2021  Office Visit     Dermatology Problem List:  1. Non-scarring alopecia: ddx psoriatic alopecia vs AA, now favoring female pattern hair loss, stable  - Scalp biopsy 1/2017 most consistent with psoriatic alopecia vs. alopecia areata, initially did not have any evidence of psoriasis elsewhere on body but rash on bilateral ears noted 5/7/18    - ILK 10 mg/cc 4/2017 and 5/2017 without much improvement.  - Patient stopped clobetasol 0.05% shampoo due to high cost  - Current Tx: alternate ketoconazole 2% shampoo and DHS zinc shampoo daily, laser comb TIW, Rogaine 5% foam daily, daily multivitamin, spironolactone 150 mg daily     2. Seborrheic Dermatitis  - Current Tx: derma-smoothe/fs oil 1-2x weekly for the winter months     3. History of dermatitis, behind ears  - s/p 0.1% Triamcinolone ointment (when needed)     4. History of low iron stores  - WNL 9/3/2020  - Labs pending (11/1/21) ferritin, CBC w diff, iron studies  ____________________________________________    Assessment & Plan:     1. Non-scarring alopecia: presently favoring female pattern hair loss; previously ddx with psoriatic alopecia vs AA. Stable compared to last visit.     Labs pending (11/1/21) ferritin, CBC with differential, iron studies    Continue spironolactone 150 mg daily.    Start zinc DHS shampoo. Discussed Head and Shoulders OTC.    Alternate use of 2% ketoconazole shampoo and zinc DHS shampoo.    Continue with Rogaine 5% foam daily    Continue with laser comb three times weekly    Continue with multivitamin    Discussed PRP with patient. She is  potentially interested in the future. Consider oral minoxidil in the future before trying PRP.     Discussed that patient should follow up sooner if she experiences another episode of focal hair loss like the one in her temporal region.     2. Seborrheic Dermatitis    Use Derma-Smoothe/FS (fluocinolone acetonide) oil application in the winter. Apply to entire scalp one to two times weekly.      Procedures Performed:   Hair metrix    Follow-up: 3 month phone visit, 6 month(s) in-person, or earlier for new episode of focal hair loss    Staff and Medical Student:  Patient seen and staffed with Dr. Betzaida Perez, MS3    Staff Physician:  I was present with the medical student who participated in the service and in the documentation of the note. I have verified the history and personally performed the physical exam and medical decision making. I agree with the assessment and plan of care as documented in the note.       Velvet Huston MD  Professor and Chair  Department of Dermatology  [facility]: Phone: [phone], Fax:[phone]  [facility]: Phone: [phone], Fax: [phone]        ____________________________________________    CC: Hair Loss (Non-scarring alopecia - Daily states her hair loss has been a bit of a rollar coaster.)    HPI:  Ms. Daily Vyas is a 26 year old female who presents as a return patient for follow-up of hair loss, diagnosed as non-scarring alopecia.   - Last seen in-clinic on 2020  - Shedding or thinning, or both: both  - Current tx: keto shampoo 2% every day, Rogaine every day, spironolactone 150mg, laser comb 3 times weekly, multivitamin with iron. Not using clobetasol due to high cost on current insurance.  - If using Rogaine, 1 cannister lasts how lon weeks  - Scalp or hair care habits/products: none  No Any new medications,  supplements, or products? (please list below)     No Scalp pain   No Scalp burning   No Scalp itching    No Eyebrow changes    No Eyelash changes   No Beard changes    No Other body hair changes    No Nail changes    No Additional symptoms? (please list below)     - Overall course: Stable. She sees regrowth but feels she is still having shedding. She reports some focal hair loss from the right temporal region following an episode of stress this summer, and there is now regrowth in this area.  - COVID status: no      Patient is otherwise feeling well, in usual state of health, and has no additional skin concerns today.       Labs:   CBC , CMP , Iron studies , TSH and Vitamin D reviewed.    Physical Exam:  Vitals: There were no vitals taken for this visit.  GEN: Well developed, well-nourished, in no acute distress, in a pleasant mood.    SKIN: Focused examination of scalp and face was performed.  - Part width is stable from last visit on 8/17/20  - The layers of hair regrowth layers were noted to be  - robust 1-2 cm for the first  - robust 3-4 cm at the second  - 6-7 cm at the third   - 12-14 cm at the fourth   - Diffuse perifollicular scale  - Telangiectasia on the R and L temporal regions   - normal eyelash density  - normal eyebrow density  - In comparison to prior photographs, stable  - No other lesions of concern on areas examined.       Medications:  Current Outpatient Medications   Medication     clobetasol propionate (CLOBEX) 0.05 % external shampoo     desogestrel-ethinyl estradiol (JOSS, VELIVET) 0.1/0.125/0.15 -0.025 MG per tablet     Fluocinolone Acetonide Scalp 0.01 % OIL oil     ibuprofen (ADVIL/MOTRIN) 200 MG tablet     ketoconazole (NIZORAL) 2 % external shampoo     spironolactone (ALDACTONE) 50 MG tablet     triamcinolone (KENALOG) 0.1 % ointment     clobetasol (TEMOVATE) 0.05 % external solution     No current facility-administered medications for this visit.      Past Medical History:   Patient  Active Problem List   Diagnosis     Alopecia     Dermatitis, seborrheic     Loss of hair     Low iron stores     Past Medical History:   Diagnosis Date     Eczema      Nickel allergy     identified by allergy to belt buckle     Uncomplicated asthma        CC Referred Self, MD  No address on file on close of this encounter.    HairMetrix Summary (11/1/2021)    Frontal anterior    Midscalp    Vertex    Occipital    Right temporal    Left temporal    Summary          Again, thank you for allowing me to participate in the care of your patient.      Sincerely,    Velvet Huston MD

## 2021-11-01 NOTE — PROGRESS NOTES
HairMetrix Summary (11/1/2021)    Frontal anterior    Midscalp    Vertex    Occipital    Right temporal    Left temporal    Summary

## 2021-11-01 NOTE — PROGRESS NOTES
[facility] Dermatology Note  Encounter Date: Nov 1, 2021  Office Visit     Dermatology Problem List:  1. Non-scarring alopecia: ddx psoriatic alopecia vs AA, now favoring female pattern hair loss, stable  - Scalp biopsy 1/2017 most consistent with psoriatic alopecia vs. alopecia areata, initially did not have any evidence of psoriasis elsewhere on body but rash on bilateral ears noted 5/7/18    - ILK 10 mg/cc 4/2017 and 5/2017 without much improvement.  - Patient stopped clobetasol 0.05% shampoo due to high cost  - Current Tx: alternate ketoconazole 2% shampoo and DHS zinc shampoo daily, laser comb TIW, Rogaine 5% foam daily, daily multivitamin, spironolactone 150 mg daily     2. Seborrheic Dermatitis  - Current Tx: derma-smoothe/fs oil 1-2x weekly for the winter months     3. History of dermatitis, behind ears  - s/p 0.1% Triamcinolone ointment (when needed)     4. History of low iron stores  - WNL 9/3/2020  - Labs pending (11/1/21) ferritin, CBC w diff, iron studies  ____________________________________________    Assessment & Plan:     1. Non-scarring alopecia: presently favoring female pattern hair loss; previously ddx with psoriatic alopecia vs AA. Stable compared to last visit.     Labs pending (11/1/21) ferritin, CBC with differential, iron studies    Continue spironolactone 150 mg daily.    Start zinc DHS shampoo. Discussed Head and Shoulders OTC.    Alternate use of 2% ketoconazole shampoo and zinc DHS shampoo.    Continue with Rogaine 5% foam daily    Continue with laser comb three times weekly    Continue with multivitamin    Discussed PRP with patient. She is potentially interested in the future. Consider oral minoxidil in the future before trying PRP.     Discussed that patient should follow up sooner if she experiences another episode of focal hair loss like the one in her temporal region.     2. Seborrheic Dermatitis    Use Derma-Smoothe/FS (fluocinolone acetonide) oil application  in the winter. Apply to entire scalp one to two times weekly.      Procedures Performed:   Hair metrix    Follow-up: 3 month phone visit, 6 month(s) in-person, or earlier for new episode of focal hair loss    Staff and Medical Student:  Patient seen and staffed with Dr. Betzaida Perez, MS3    Staff Physician:  I was present with the medical student who participated in the service and in the documentation of the note. I have verified the history and personally performed the physical exam and medical decision making. I agree with the assessment and plan of care as documented in the note.       Velvet Huston MD  Professor and Chair  Department of Dermatology  [facility]: Phone: [phone], Fax:[phone]  [facility]: Phone: [phone], Fax: [phone]        ____________________________________________    CC: Hair Loss (Non-scarring alopecia - Daily states her hair loss has been a bit of a rollar coaster.)    HPI:  Ms. Daily Vyas is a 26 year old female who presents as a return patient for follow-up of hair loss, diagnosed as non-scarring alopecia.   - Last seen in-clinic on 2020  - Shedding or thinning, or both: both  - Current tx: keto shampoo 2% every day, Rogaine every day, spironolactone 150mg, laser comb 3 times weekly, multivitamin with iron. Not using clobetasol due to high cost on current insurance.  - If using Rogaine, 1 cannister lasts how lon weeks  - Scalp or hair care habits/products: none  No Any new medications, supplements, or products? (please list below)     No Scalp pain   No Scalp burning   No Scalp itching    No Eyebrow changes    No Eyelash changes   No Beard changes    No Other body hair changes    No Nail changes    No Additional symptoms? (please list below)     - Overall course: Stable. She sees regrowth but feels she is still having  shedding. She reports some focal hair loss from the right temporal region following an episode of stress this summer, and there is now regrowth in this area.  - COVID status: no      Patient is otherwise feeling well, in usual state of health, and has no additional skin concerns today.       Labs:   CBC , CMP , Iron studies , TSH and Vitamin D reviewed.    Physical Exam:  Vitals: There were no vitals taken for this visit.  GEN: Well developed, well-nourished, in no acute distress, in a pleasant mood.    SKIN: Focused examination of scalp and face was performed.  - Part width is stable from last visit on 8/17/20  - The layers of hair regrowth layers were noted to be  - robust 1-2 cm for the first  - robust 3-4 cm at the second  - 6-7 cm at the third   - 12-14 cm at the fourth   - Diffuse perifollicular scale  - Telangiectasia on the R and L temporal regions   - normal eyelash density  - normal eyebrow density  - In comparison to prior photographs, stable  - No other lesions of concern on areas examined.       Medications:  Current Outpatient Medications   Medication     clobetasol propionate (CLOBEX) 0.05 % external shampoo     desogestrel-ethinyl estradiol (JOSS, VELIVET) 0.1/0.125/0.15 -0.025 MG per tablet     Fluocinolone Acetonide Scalp 0.01 % OIL oil     ibuprofen (ADVIL/MOTRIN) 200 MG tablet     ketoconazole (NIZORAL) 2 % external shampoo     spironolactone (ALDACTONE) 50 MG tablet     triamcinolone (KENALOG) 0.1 % ointment     clobetasol (TEMOVATE) 0.05 % external solution     No current facility-administered medications for this visit.      Past Medical History:   Patient Active Problem List   Diagnosis     Alopecia     Dermatitis, seborrheic     Loss of hair     Low iron stores     Past Medical History:   Diagnosis Date     Eczema      Nickel allergy     identified by allergy to belt buckle     Uncomplicated asthma        CC Referred Self, MD  No address on file on close of this encounter.

## 2021-11-01 NOTE — NURSING NOTE
Dermatology Rooming Note    Daily Vyas's goals for this visit include:   Chief Complaint   Patient presents with     Hair Loss     Non-scarring alopecia - Daily states her hair loss has been a bit of a rollar coaster.     Holly Victor, [facility]

## 2022-02-02 ENCOUNTER — TRANSCRIBE ORDERS (OUTPATIENT)
Dept: OTHER | Age: 27
End: 2022-02-02
Payer: COMMERCIAL

## 2022-02-02 DIAGNOSIS — D23.9 FIBROUS HISTIOCYTOMA OF SKIN: Primary | ICD-10-CM

## 2022-02-11 NOTE — TELEPHONE ENCOUNTER
FUTURE VISIT INFORMATION      FUTURE VISIT INFORMATION:    Date: 3.7.22    Time: 3:00    Location: [facility] [place]  REFERRAL INFORMATION:    Referring provider:  Dr. Neena Massey    Referring providers clinic:  [facility]    Reason for visit/diagnosis  Fibrous histiocytoma cheek    RECORDS REQUESTED FROM:       Clinic name Comments Records Status Photos Status   [facility] 1.13.22  Path # [id number] CE Received                                       
normal...

## 2022-03-07 ENCOUNTER — PRE VISIT (OUTPATIENT)
Dept: DERMATOLOGY | Facility: CLINIC | Age: 27
End: 2022-03-07

## 2022-03-07 ENCOUNTER — OFFICE VISIT (OUTPATIENT)
Dept: DERMATOLOGY | Facility: CLINIC | Age: 27
End: 2022-03-07
Payer: COMMERCIAL

## 2022-03-07 DIAGNOSIS — D23.9 FIBROUS HISTIOCYTOMA: Primary | ICD-10-CM

## 2022-03-07 PROCEDURE — 99214 OFFICE O/P EST MOD 30 MIN: CPT | Performed by: DERMATOLOGY

## 2022-03-07 ASSESSMENT — PAIN SCALES - GENERAL: PAINLEVEL: NO PAIN (0)

## 2022-03-07 NOTE — NURSING NOTE
Dermatology Rooming Note    Daily Vyas's goals for this visit include:   Chief Complaint   Patient presents with     Derm Problem     Daily is here today for a fibrous histiocytoma on the left cheek     Holly Victor CMA

## 2022-03-07 NOTE — LETTER
3/7/2022       RE: Daily Vyas  [address] [facility] [address]     Dear Colleague,    Thank you for referring your patient, Daily Vyas, to the [facility] DERMATOLOGIC SURGERY CLINIC [place] at [facility]. Please see a copy of my visit note below.    Mohs Micrographic Surgery Consult Note    Mar 7, 2022    Dermatology Problem List:  1. Non-scarring alopecia: ddx psoriatic alopecia vs AA, now favoring female pattern hair loss, stable  - Scalp biopsy 1/2017 most consistent with psoriatic alopecia vs. alopecia areata, initially did not have any evidence of psoriasis elsewhere on body but rash on bilateral ears noted 5/7/18    - ILK 10 mg/cc 4/2017 and 5/2017 without much improvement.  - Patient stopped clobetasol 0.05% shampoo due to high cost  - Current Tx: alternate ketoconazole 2% shampoo and DHS zinc shampoo daily, laser comb TIW, Rogaine 5% foam daily, daily multivitamin, spironolactone 150 mg daily  2. Seborrheic Dermatitis  - Current Tx: derma-smoothe/fs oil 1-2x weekly for the winter months  3. History of dermatitis, behind ears  - s/p 0.1% Triamcinolone ointment (when needed)  4. History of low iron stores  - WNL 9/3/2020  - Labs pending (11/1/21) ferritin, CBC w diff, iron studies  5. Fibrous histiocytoma, L cheek, pending [facility]    Subjective: The patient is a 26 year old woman who presents today for Mohs micrographic surgery consultation for a recent diagnosis of skin cancer. Initially removed at the end of 2019. Repeat biopsy two months ago showed recurrence. She received steroid injections in the area about 8 months ago when they noticed the scar from previous removal and now has an indent in the area.     Skin cancer(s): Fibrous histiocytoma  Location(s): L cheek  Associated symptoms: pruritus, tenderness to touch  Onset: within last 1 year    No other associated symptoms, modifying factors, or prior treatments, except when noted  above. The patient denies any constitutional symptoms, lymphadenopathy, unintentional weight loss or decreased appetite. No other skin concerns today.    Objective:   Gen: This is a well appearing individual in no acute distress. The patient is alert and oriented x 3.  An exam of the L cheek was performed today and visualized the following:  - 1.3 cm indurated and slightly atrophic plaque on the L cheek at site of known skin cancer.     Assessment and Plan:     1. Plan for Mohs micrographic surgery for skin cancer(s) above:  - We discussed the nature of the diagnosis/condition above. We discussed the treatment options, including the risks benefits and expectations of these options. We recommend micrographic surgery as the most effective and most tissue sparing option for treatment, and the patient agrees to proceed with this.  The patient is aware of the risks, benefits and expectations of this procedure. The patient will be scheduled for this procedure, if not already done so.   - Patient request anxiety medication for the day of surgery.   - Photo obtained today.   - We anticipate the following closure type: Advancement flap    The patient was discussed with and evaluated by attending physician, Kevin Ocampo MD.      Scribe Disclosure:  IElma, am serving as a scribe to document services personally performed by Kevin Ocampo MD based on data collection and the provider's statements to me.     Attestation signed by Kevin Ocampo MD at 3/11/2022  7:29 AM:  Attending Attestation  I attest that the Scribe recorded the interview and exam that I personally performed.  I have reviewed the note and edited it as necessary.    Kevin Ocampo M.D.  Professor  Director of Dermatologic Surgery  Department of Dermatology  [facility]        Again, thank you for allowing me to participate in the care of your patient.      Sincerely,    Kevin Ocampo MD

## 2022-03-07 NOTE — PROGRESS NOTES
Mohs Micrographic Surgery Consult Note    Mar 7, 2022    Dermatology Problem List:  1. Non-scarring alopecia: ddx psoriatic alopecia vs AA, now favoring female pattern hair loss, stable  - Scalp biopsy 1/2017 most consistent with psoriatic alopecia vs. alopecia areata, initially did not have any evidence of psoriasis elsewhere on body but rash on bilateral ears noted 5/7/18    - ILK 10 mg/cc 4/2017 and 5/2017 without much improvement.  - Patient stopped clobetasol 0.05% shampoo due to high cost  - Current Tx: alternate ketoconazole 2% shampoo and DHS zinc shampoo daily, laser comb TIW, Rogaine 5% foam daily, daily multivitamin, spironolactone 150 mg daily  2. Seborrheic Dermatitis  - Current Tx: derma-smoothe/fs oil 1-2x weekly for the winter months  3. History of dermatitis, behind ears  - s/p 0.1% Triamcinolone ointment (when needed)  4. History of low iron stores  - WNL 9/3/2020  - Labs pending (11/1/21) ferritin, CBC w diff, iron studies  5. Fibrous histiocytoma, L cheek, pending [facility]    Subjective: The patient is a 26 year old woman who presents today for Mohs micrographic surgery consultation for a recent diagnosis of skin cancer. Initially removed at the end of 2019. Repeat biopsy two months ago showed recurrence. She received steroid injections in the area about 8 months ago when they noticed the scar from previous removal and now has an indent in the area.     Skin cancer(s): Fibrous histiocytoma  Location(s): L cheek  Associated symptoms: pruritus, tenderness to touch  Onset: within last 1 year    No other associated symptoms, modifying factors, or prior treatments, except when noted above. The patient denies any constitutional symptoms, lymphadenopathy, unintentional weight loss or decreased appetite. No other skin concerns today.    Objective:   Gen: This is a well appearing individual in no acute distress. The patient is alert and oriented x 3.  An exam of the L cheek was performed today and visualized  the following:  - 1.3 cm indurated and slightly atrophic plaque on the L cheek at site of known skin cancer.     Assessment and Plan:     1. Plan for Mohs micrographic surgery for skin cancer(s) above:  - We discussed the nature of the diagnosis/condition above. We discussed the treatment options, including the risks benefits and expectations of these options. We recommend micrographic surgery as the most effective and most tissue sparing option for treatment, and the patient agrees to proceed with this.  The patient is aware of the risks, benefits and expectations of this procedure. The patient will be scheduled for this procedure, if not already done so.   - Patient request anxiety medication for the day of surgery.   - Photo obtained today.   - We anticipate the following closure type: Advancement flap    The patient was discussed with and evaluated by attending physician, Kevin Ocampo MD.      Scribe Disclosure:  Elma SENIOR, am serving as a scribe to document services personally performed by Kevin Ocampo MD based on data collection and the provider's statements to me.

## 2022-03-07 NOTE — LETTER
Date:March 11, 2022      Provider requested that no letter be sent. Do not send.       [facility]

## 2022-03-16 NOTE — PROGRESS NOTES
[facility] Mohs Surgery Procedure Note    Case #: 1  Date of Service:  Mar 17, 2022  Surgery: Mohs micrographic surgery (MMS)  Staff surgeon: Kevin Ocampo MD  Fellow surgeon: Paul Barajas MD  Resident surgeon: Patricia Foster MD  Nurse: Charu Lynne CMA    Tumor Type: Fibrous histiocytoma  Location: Left malar cheek  Derm-Path Accession #: Outside path from [facility] Derm [id number]    Mohs Accession #:   Pre-Op Size: 2.5 cm x 3.1 cm  Final Defect Size: 5.7 cm x 3.5 cm  Number of Mohs stages: 1  Level of Defect: Muscle  Local anesthetic: 12 mL 1% lidocaine with epinephrine  Repair Type: Rotation flap with FTSG  Repair Size: 7 cm x 6 cm (rotation flap); 1.9 cm x 0.7 cm (FTSG)  Suture Material: 3-0 Vicryl; 4-0 Monocryl; 5-0 fast absorbing gut    Procedure:    Stage I  We discussed the principles of treatment and most likely complications including scarring, bleeding, infection, swelling, pain, crusting, nerve damage, large wound,  incomplete excision, wound dehiscence,  nerve damage, recurrence, and a second procedure may be recommended to obtain the best cosmetic or functional result.    Informed consent was obtained and the patient underwent the procedure as follows:  The patient was placed supine on the operating table.  The cancer was identified, outlined with a marker, and verified by the patient.  The entire surgical field was prepped with chlorhexidine.  The surgical site was anesthetized using lidocaine with epinephrine.    The area of clinically apparent tumor was debulked. The layer of tissue was then surgically excised using a #15 blade and was then transferred onto a specimen sheet maintaining the orientation of the specimen. Hemostasis was obtained using electrocoagulation. The wound site was then covered with a dressing while the tissue samples were processed for examination.    The excised tissue was transported to the Mohs histology laboratory maintaining the tissue  orientation.  The tissue specimen was relaxed so that the entire surgical margin was in a a single horizontal plane for sectioning and inked for precise mapping.  A precise reference map was drawn to reflect the sectioning of the specimen, colored inking of the margins, and orientation on the patient.  The tissue was processed using horizontal sectioning of the base and continuous peripheral margins.  The histopathologic sections were reviewed in conjunction with the reference map.    Total blocks: 5  Total slides: 14    There were no cancer cells visualized on examination, therefore Mohs surgery was complete.    PROCEDURE:  ROTATION FLAP    The patient was taken to the operative suite and placed in a supine position on the operating room table.  The wound was identified.  Using a marker, the rotation flap was planned.  The defect and surrounding area was infiltrated with 1% lidocaine with epinephrine.  The defect was then cleansed and prepped with chlorhexidine and draped with sterile drapes.   The wound edges were debeveled and the wound was undermined bluntly in all directions. The rotation flap was incised sharply to the level of fat.  The flap was undermined from all surrounding tissue.  Hemostasis was obtained using electrocoagulation. The flap was then rotated into the primary defect secured with buried vertical mattress sutures.  A cone of redundant skin was excised opposite the leading edge of the flap and careful attention was given to maintaining the pedicle to the flap. Deep dermal sutures using 3-0 Vicryl and 4-0 Monocryl were used. The epidermis was then carefully approximated using 5-0 fast absorbing gut throughout the length of the flap.  Careful attention was given to even approximation of the wound edges.      The wound was cleansed with saline and an ointment was applied.  A sterile non-adherent pressure dressing was placed.  The patient left the operating suite in stable condition. Wound care was  reviewed verbally and in writing.     REPAIR: Full Thickness Skin Graft with Complex Repair of Donor Site    PROCEDURE:  The patient was placed supine on the operating room table.  The defect was measured and identified.  A template was made.  The L lower cheek donor site was chosen as this had the closest color and texture match to the surrounding defect skin.  An anticipated graft design was drawn at the donor site.  The graft recipient site and donor site were then infiltrated with 1% lidocaine with epinephrine and both areas were prepped with chlorhexidine and draped in a sterile fashion.    Hemostasis was obtained with electrocoagulation.  The donor site was then addressed.  The graft (1.9 cm x 0.7 cm) at left cheek was harvested using a #15 blade by excising to the level of fat.  The graft was placed on saline-soaked gauze.  The defect was closed after undermining bluntly in all directions and hemostasis obtained with electrocoagulation.  The wound edges were approximated using 5-0 fast absorbing gut (final donor site wound length: 7 x 6 cm).  Fibrofatty tissue was trimmed away with tissue scissors to thin the graft.  The graft was then placed over the defect and secured with anchoring sutures with 5-0 fast absorbing gut.  The graft was trimmed to fit the defect and secured fully with bolster sutures.  The graft and donor sites were cleansed with saline.  A bolster dressing was placed over the graft comprised of a layer of ointment and Xeroform gauze and was secured using sutures.  An ointment and non-adherent pressure dressing was applied to the donor site.  Wound care instructions were given verbally and in writing.  The patient left the operating room in stable condition.    Follow-up for suture removal: Not applicable as only dissolving sutures used    Kevin Ocampo MD was present for the key portions of the procedure and always immediately available.    Dr. Paul Barajas (Mohs micrographic surgery fellow)  performed the Mohs micrographic surgery and reconstruction under the direct supervision of Kevin Ocampo MD, who was present for the entire micrographic surgery and key portions of the reconstruction, and always immediately available.    Scribe Disclosure:  I, Maria Dolores Meek, am serving as a scribe to document services personally performed by Kevin Ocampo MD based on data collection and the provider's statements to me.

## 2022-03-17 ENCOUNTER — OFFICE VISIT (OUTPATIENT)
Dept: DERMATOLOGY | Facility: CLINIC | Age: 27
End: 2022-03-17
Attending: DERMATOLOGY
Payer: COMMERCIAL

## 2022-03-17 VITALS — DIASTOLIC BLOOD PRESSURE: 91 MMHG | SYSTOLIC BLOOD PRESSURE: 140 MMHG | HEART RATE: 112 BPM

## 2022-03-17 DIAGNOSIS — D23.9 FIBROUS HISTIOCYTOMA OF SKIN: Primary | ICD-10-CM

## 2022-03-17 DIAGNOSIS — G89.18 POST-OPERATIVE PAIN: ICD-10-CM

## 2022-03-17 PROCEDURE — 88341 IMHCHEM/IMCYTCHM EA ADD ANTB: CPT | Mod: 26 | Performed by: DERMATOLOGY

## 2022-03-17 PROCEDURE — 15240 FTH/GFT F/C/C/M/N/AX/G/H/F20: CPT | Performed by: DERMATOLOGY

## 2022-03-17 PROCEDURE — 88342 IMHCHEM/IMCYTCHM 1ST ANTB: CPT | Mod: 26 | Performed by: DERMATOLOGY

## 2022-03-17 PROCEDURE — 88341 IMHCHEM/IMCYTCHM EA ADD ANTB: CPT | Mod: TC | Performed by: DERMATOLOGY

## 2022-03-17 PROCEDURE — 17311 MOHS 1 STAGE H/N/HF/G: CPT | Performed by: DERMATOLOGY

## 2022-03-17 PROCEDURE — 14301 TIS TRNFR ANY 30.1-60 SQ CM: CPT | Performed by: DERMATOLOGY

## 2022-03-17 PROCEDURE — 88305 TISSUE EXAM BY PATHOLOGIST: CPT | Mod: 26 | Performed by: DERMATOLOGY

## 2022-03-17 RX ORDER — ACETAMINOPHEN 500 MG
1000 TABLET ORAL ONCE
Status: COMPLETED | OUTPATIENT
Start: 2022-03-17 | End: 2022-03-17

## 2022-03-17 RX ORDER — ALPRAZOLAM 0.5 MG
0.5 TABLET ORAL ONCE
Status: COMPLETED | OUTPATIENT
Start: 2022-03-17 | End: 2022-03-17

## 2022-03-17 RX ORDER — TRAMADOL HYDROCHLORIDE 50 MG/1
50 TABLET ORAL EVERY 6 HOURS PRN
Qty: 12 TABLET | Refills: 0 | Status: SHIPPED | OUTPATIENT
Start: 2022-03-17 | End: 2022-03-20

## 2022-03-17 RX ORDER — TRAMADOL HYDROCHLORIDE 50 MG/1
50 TABLET ORAL EVERY 6 HOURS PRN
Qty: 10 TABLET | Refills: 0 | Status: SHIPPED | OUTPATIENT
Start: 2022-03-17 | End: 2022-03-17

## 2022-03-17 RX ADMIN — Medication 1000 MG: at 13:46

## 2022-03-17 RX ADMIN — Medication 0.5 MG: at 08:40

## 2022-03-17 RX ADMIN — Medication 0.5 MG: at 11:41

## 2022-03-17 ASSESSMENT — PAIN SCALES - GENERAL: PAINLEVEL: NO PAIN (0)

## 2022-03-17 NOTE — PROGRESS NOTES
Drug Administration Record    Prior to injection, verified patient identity using patient's name and date of birth.  Due to injection administration, patient instructed to remain in clinic for 15 minutes  afterwards, and to report any adverse reaction to me immediately.    Drug Name: tylenol  Dose: 1000mg  Route administered: PO  NDC #: 1997770036  Amount of waste(mL):0  Reason for waste: single packs    LOT #: [id number]  SITE: N/A  : [employer]  EXPIRATION DATE: 08/2023

## 2022-03-17 NOTE — PATIENT INSTRUCTIONS
Mohs Wound Care Instructions  I will experience scar, altered skin color, bleeding, swelling, pain, crusting and redness. I may experience altered sensation. Risks are excessive bleeding, infection, muscle weakness, thick (hypertrophic or keloidal) scar, and recurrence. A second procedure may be recommended to obtain the best cosmetic or functional result.  Possible complications of any surgical procedure are bleeding, infection, scarring, alteration in skin color and sensation, muscle weakness in the area, wound dehiscence or seperation, or recurrence of the lesion or disease. On occasion, after healing, a secondary procedure or revision may be recommended in order to obtain the best cosmetic or functional result.   After your surgery, a pressure bandage will be placed over the area that has sutures. This will help prevent bleeding. Please follow these instructions as they will help you to prevent complications as your wound heals.  For the First 48 hours After Surgery:  1. Leave the pressure bandage on and keep it dry. If it should come loose, you may retape it, but do not take it off.  2. Relax and take it easy. Do not do any vigorous exercise, heavy lifting, or bending forward. This could cause the wound to bleed.  3. Post-operative pain is usually mild. You may take plain or extra strength Tylenol every 4 hours as needed (do not take more than 4,000mg in one day). Do not take any medicine that contains aspirin, ibuprofen or motrin unless you have been recommended these by a doctor.  Avoid alcohol and vitamin E as these may increase your tendency to bleed.  4. You may put an ice pack around the bandaged area for 20 minutes every 2-3 hours. This may help reduce swelling, bruising, and pain. Make sure the ice pack is waterproof so that the pressure bandage does not get wet.   5. You may see a small amount of drainage or blood on your pressure bandage. This is normal. However, if drainage or bleeding continues or  saturates the bandage, you will need to apply firm pressure over the bandage with a washcloth for 15 minutes. If bleeding continues after applying pressure for 15 minutes then go to the nearest emergency room.  48 Hours After Surgery  Carefully remove the bandage and start daily wound care and dressing changes. You may also now shower and get the wound wet.  Daily Wound Care:  1. Wash wound with a mild soap and water.  Use caution when washing the wound, be gentle and do not let the forceful shower stream hit the wound directly.  2. Pat dry.  3. Apply Vaseline (from a new container or tube) over the suture line with a Q-tip. It is very important to keep the wound continuously moist, as wounds heal best in a moist environment.  4. Keep the site covered until sutures are dissolved, you can cover it with a Telfa (non-stick) dressing and tape or a band-aid.    5. If you are unable to keep wound covered, you must apply Vaseline every 2-3 hours (while awake) to ensure it is being kept moist for optimal healing. A dressing overnight is recommended to keep the area moist.  Call Us If:  1. You have pain that is not controlled with Tylenol.  2. You have signs or symptoms of an infection, such as: fever over 100 degrees F, redness, warmth, or foul-smelling or yellow/creamy drainage from the wound.  Who should I call with questions?    [facility]: [phone]     [facility]: [phone]    For urgent needs outside of business hours call the [facility] at [phone] and ask to speak with the dermatology resident on call

## 2022-03-17 NOTE — NURSING NOTE
Chief Complaint   Patient presents with     Derm Problem     Daily is here today fro mohs on left malar cheek due to fibrous histiocytoma     Clover Villalobos LPN

## 2022-03-18 NOTE — NURSING NOTE
Drug Administration Record    Prior to injection, verified patient identity using patient's name and date of birth.  Due to injection administration, patient instructed to remain in clinic for 15 minutes  afterwards, and to report any adverse reaction to me immediately.    Drug Name: bupvacaine 0.5%  Dose: 50 mg per 10 ml  Route administered: ID  NDC #: [id number]  Amount of waste(mL):0  Reason for waste: Single use vial    LOT #: [id number]  SITE: left cheek  : [employer]  EXPIRATION DATE: 6/2024

## 2022-03-22 LAB
PATH REPORT.COMMENTS IMP SPEC: NORMAL
PATH REPORT.COMMENTS IMP SPEC: NORMAL
PATH REPORT.FINAL DX SPEC: NORMAL
PATH REPORT.GROSS SPEC: NORMAL
PATH REPORT.MICROSCOPIC SPEC OTHER STN: NORMAL
PATH REPORT.RELEVANT HX SPEC: NORMAL

## 2022-03-24 ENCOUNTER — MYC MEDICAL ADVICE (OUTPATIENT)
Dept: DERMATOLOGY | Facility: CLINIC | Age: 27
End: 2022-03-24
Payer: COMMERCIAL

## 2022-04-04 ENCOUNTER — OFFICE VISIT (OUTPATIENT)
Dept: DERMATOLOGY | Facility: CLINIC | Age: 27
End: 2022-04-04
Payer: COMMERCIAL

## 2022-04-04 DIAGNOSIS — D23.9 FIBROUS HISTIOCYTOMA: Primary | ICD-10-CM

## 2022-04-04 PROCEDURE — 99024 POSTOP FOLLOW-UP VISIT: CPT | Mod: GC | Performed by: DERMATOLOGY

## 2022-04-04 ASSESSMENT — PAIN SCALES - GENERAL: PAINLEVEL: NO PAIN (0)

## 2022-04-04 NOTE — LETTER
4/4/2022       RE: Daily Vyas  [address] [facility] [address]     Dear Colleague,    Thank you for referring your patient, Daily Vyas, to the [facility] D[facility]MATOLOGIC [facility] [place] at [facility]. Please see a copy of my visit note below.    Dermatologic Surgery Clinic Note    Apr 4, 2022    Dermatology Problem List:  1. Non-scarring alopecia: ddx psoriatic alopecia vs AA, now favoring female pattern hair loss, stable  - Scalp biopsy 1/2017 most consistent with psoriatic alopecia vs. alopecia areata, initially did not have any evidence of psoriasis elsewhere on body but rash on bilateral ears noted 5/7/18    - ILK 10 mg/cc 4/2017 and 5/2017 without much improvement.  - Patient stopped clobetasol 0.05% shampoo due to high cost  - Current Tx: alternate ketoconazole 2% shampoo and DHS zinc shampoo daily, laser comb TIW, Rogaine 5% foam daily, daily multivitamin, spironolactone 150 mg daily  2. Seborrheic Dermatitis  - Current Tx: derma-smoothe/fs oil 1-2x weekly for the winter months  3. History of dermatitis, behind ears  - s/p 0.1% Triamcinolone ointment (when needed)  4. History of low iron stores  - WNL 9/3/2020  - Labs pending (11/1/21) ferritin, CBC w diff, iron studies  5. 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 No concerns for infection today. The patient continues with daily wound cares as recommended.    She felt an extremely painful sensation over her surgical site a week out from surgery. She suspects that she pulled on a suture with a sneeze or cough. The pain resolved after 24 hours and has not returned. Her left eye has also been swollen, but she has otherwise not noticed any changes symptoms.     No other associated symptoms, modifying factors, or prior treatments, except when noted above.  The patient denies any constitutional symptoms, lymphadenopathy, unintentional weight loss or decreased appetite. No other skin concerns today.    Objective:   Gen: This is a well appearing individual in no acute distress. The patient is alert and oriented x 3.  An exam of the L cheek was performed today and visualized the following:  - The surgical site noted above is clean, dry, and intact. There is no surrounding erythema, purulence, or significant tenderness to palpation. No clinical evidence of infection noted today.    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 No evidence of infection on examination today.  - The patient was told to continue with wound cares until the area(s) is/are no longer crusted.   - The patient should follow up with dermatologic surgery in 6 weeks, as well as continue with regular skin exams in general dermatology clinic.  - We discussed expectations for improvement at length today as well as various scar treatments. Advised to stay out of direct sunlight or wear a wide brimmed hat for 1 month. After that point, she can start wearing sunscreen or make-up. Recommended Mederma gel to help with scar appearance. Consider PDL in 3-4 months.       The patient was discussed with and evaluated by attending physician, Kevin Ocampo MD.    Paul Barajas MD  Micrographic Surgery and Dermatologic Oncology (MSDO) Fellow    Scribe Disclosure:  I, Elma Randolph, am serving as a scribe to document services personally performed by Kevin Ocampo MD based on data collection and the provider's statements to me.     Attestation signed by Kevin Ocampo MD at 4/5/2022  1:05 PM:  Attending Attestation  I attest that I discussed the case with the Fellow.  I agree with the plan.   I have reviewed the note and edited it as necessary.    Kevin Ocampo M.D.  Professor  Director of Dermatologic Surgery  Department of  Dermatology  [facility]        Again, thank you for allowing me to participate in the care of your patient.      Sincerely,    Kevin Ocampo MD

## 2022-04-04 NOTE — LETTER
Date:April 6, 2022      Provider requested that no letter be sent. Do not send.       [facility]

## 2022-04-04 NOTE — PROGRESS NOTES
Dermatologic Surgery Clinic Note    Apr 4, 2022    Dermatology Problem List:  1. Non-scarring alopecia: ddx psoriatic alopecia vs AA, now favoring female pattern hair loss, stable  - Scalp biopsy 1/2017 most consistent with psoriatic alopecia vs. alopecia areata, initially did not have any evidence of psoriasis elsewhere on body but rash on bilateral ears noted 5/7/18    - ILK 10 mg/cc 4/2017 and 5/2017 without much improvement.  - Patient stopped clobetasol 0.05% shampoo due to high cost  - Current Tx: alternate ketoconazole 2% shampoo and DHS zinc shampoo daily, laser comb TIW, Rogaine 5% foam daily, daily multivitamin, spironolactone 150 mg daily  2. Seborrheic Dermatitis  - Current Tx: derma-smoothe/fs oil 1-2x weekly for the winter months  3. History of dermatitis, behind ears  - s/p 0.1% Triamcinolone ointment (when needed)  4. History of low iron stores  - WNL 9/3/2020  - Labs pending (11/1/21) ferritin, CBC w diff, iron studies  5. 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 No concerns for infection today. The patient continues with daily wound cares as recommended.    She felt an extremely painful sensation over her surgical site a week out from surgery. She suspects that she pulled on a suture with a sneeze or cough. The pain resolved after 24 hours and has not returned. Her left eye has also been swollen, but she has otherwise not noticed any changes symptoms.     No other associated symptoms, modifying factors, or prior treatments, except when noted above. The patient denies any constitutional symptoms, lymphadenopathy, unintentional weight loss or decreased appetite. No other skin concerns today.    Objective:   Gen: This is a well appearing individual in no acute distress. The patient is alert and oriented x 3.  An exam of the L cheek was performed today and visualized the  following:  - The surgical site noted above is clean, dry, and intact. There is no surrounding erythema, purulence, or significant tenderness to palpation. No clinical evidence of infection noted today.    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 No evidence of infection on examination today.  - The patient was told to continue with wound cares until the area(s) is/are no longer crusted.   - The patient should follow up with dermatologic surgery in 6 weeks, as well as continue with regular skin exams in general dermatology clinic.  - We discussed expectations for improvement at length today as well as various scar treatments. Advised to stay out of direct sunlight or wear a wide brimmed hat for 1 month. After that point, she can start wearing sunscreen or make-up. Recommended Mederma gel to help with scar appearance. Consider PDL in 3-4 months.       The patient was discussed with and evaluated by attending physician, Kevin Ocampo MD.    Paul Barajas MD  Micrographic Surgery and Dermatologic Oncology (MSDO) Fellow    Scribe Disclosure:  Elma SENIOR, am serving as a scribe to document services personally performed by Kevin Ocampo MD based on data collection and the provider's statements to me.

## 2022-04-04 NOTE — NURSING NOTE
Chief Complaint   Patient presents with     Derm Problem     Patietn is here today for follow up on left cheek.      Charu EMMANUEL CMA

## 2022-05-09 ENCOUNTER — OFFICE VISIT (OUTPATIENT)
Dept: DERMATOLOGY | Facility: CLINIC | Age: 27
End: 2022-05-09
Payer: COMMERCIAL

## 2022-05-09 VITALS — SYSTOLIC BLOOD PRESSURE: 120 MMHG | DIASTOLIC BLOOD PRESSURE: 78 MMHG | HEART RATE: 100 BPM

## 2022-05-09 DIAGNOSIS — L30.9 DERMATITIS: Primary | ICD-10-CM

## 2022-05-09 DIAGNOSIS — L65.9 LOSS OF HAIR: ICD-10-CM

## 2022-05-09 PROCEDURE — 99214 OFFICE O/P EST MOD 30 MIN: CPT | Mod: GC | Performed by: DERMATOLOGY

## 2022-05-09 ASSESSMENT — PAIN SCALES - GENERAL: PAINLEVEL: NO PAIN (0)

## 2022-05-09 NOTE — LETTER
5/9/2022       RE: Daily Vyas  [address] [facility] [address]     Dear Colleague,    Thank you for referring your patient, Daily Vyas, to the [facility] [place] at [facility]. Please see a copy of my visit note below.    [facility] Dermatology Note  Encounter Date: May 9, 2022  Office Visit     Dermatology Problem List:  1. Non-scarring alopecia: ddx psoriatic alopecia vs AA, now favoring female pattern hair loss, stable  - Scalp biopsy 1/2017 most consistent with psoriatic alopecia vs. alopecia areata, initially did not have any evidence of psoriasis elsewhere on body but rash on bilateral ears noted 5/7/18    - ILK 10 mg/cc 4/2017 and 5/2017 without much improvement.  - Patient stopped clobetasol 0.05% shampoo due to high cost  - Current Tx: alternate ketoconazole 2% shampoo and DHS zinc shampoo daily, laser comb TIW, Rogaine 5% foam daily, daily multivitamin, spironolactone 150 mg daily  2. Seborrheic Dermatitis  - Current Tx: derma-smoothe/fs oil 1-2x weekly for the winter months  3. History of dermatitis, behind ears  - s/p 0.1% Triamcinolone ointment (when needed)  4. History of low iron stores  - WNL 9/3/2020 and 11/2021  5. 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 Non-scarring alopecia: presently favoring female pattern hair loss; previously ddx with psoriatic alopecia vs AA. Overall stable to improved compared to last visit.     Continue spironolactone 150 mg daily.    Continue Head and Shoulders     Alternate use of 2% ketoconazole shampoo and Head and Shoulders     Continue with Rogaine 5% foam daily    Continue with laser comb three times weekly    Previously discussed PRP with patient. She is potentially interested in the future but would consider oral minoxidil in the future before trying PRP.      2.  Seborrheic Dermatitis  - Use Derma-Smoothe/FS (fluocinolone acetonide) oil application in the winter.     3. Fibrous Histiocytoma   S/p MMS 3/7/22. Follws with Dr. Ocampo with upcoming post-procedure follow-up as planned       Procedures Performed:   Hair metrix    Follow-up: 6 month(s) in-person, or earlier for new or changing lesions    Staff and Resident:   Dr. Huston (staff dermatologist)  Dr. Hampton (IM PGY-3)    Patient was seen by and discussed with Dr. Huston (staff dermatologist)    Judy Hampton MD  IM PGY-3    Patient was seen and examined with the medicine resident. I agree with the history, review of systems, physical examination, assessments and plan.    Velvet Huston MD  Professor and  Chair  Department of Dermatology  [facility]  ____________________________________________    CC: Hair loss follow-up    HPI:  Ms. Daily Vyas is a 26 year old female who presents as a return patient for follow-up of hair loss, diagnosed as non-scarring alopecia.   - Last seen in-clinic on 21  - Shedding or thinning, or both: both  - Current tx: spironolactone 150 mg daily, DHS zinc shampoo, Rogaine 5% foam, LLLT 2x/week. Stopped using ketoconazole 2% shampoo at time of Mohs surgery due to concern about it causing issues with her incision.   - She washes hair once daily with DHS zinc shampoo   - She stopped taking her MV with iron (no side effects from it)  - She is tolerating her spironolactone well  - She feels like her hair thinning and shedding was overall stable. In the last 2-3 weeks she has noted some increased diffuse shedding. She is concerned this is secondary to stress from her recent Mohs surgery which was completed on 3/7/22  - If using Rogaine, 1 cannister lasts how lon-5 weeks   - Scalp or hair care habits/products: no other treatments or specific habits   No Any new medications, supplements, or products? (please list below)     No Scalp pain   No Scalp burning   No  Scalp itching    Possibly Eyebrow changes; thought that her left eyebrow may have become elongated with her incision after her Mohs surgery    No Eyelash changes   No Beard changes    No Other body hair changes    No Nail changes    No Additional symptoms? (please list below)     - Overall course: Stable. She felt like hair loss was stable. Did have some increase in shedding las 2-3 weeks which she thought was due to stress from recent Mohs surgery.   - COVID status: no    Patient is otherwise feeling well, in usual state of health, and has no additional skin concerns today.     Labs:  Iron studies  reviewed; last ferritin WNL in 11/2021    Physical Exam:  Vitals: There were no vitals taken for this visit.  GEN: Well developed, well-nourished, in no acute distress, in a pleasant mood.    SKIN: Focused examination of scalp, face and nails was performed.  - Hammad part width of 1.2 throughout   - No pendleton   - The layers of hair regrowth layers were noted to be  - 1-2 cm for the first robust  - 4-5 cm at the second robust  - 7-8 cm at the third  - no diffuse erythema   - no perifollicular erythema  - no perifollicular scale   - no scaling of the scalp   - negative hair pull test   - normal eyelash density  - normal eyebrow density  - no nail pitting or dystrophy   - scalp folliculitis/pustules   - In comparison to prior photographs, significant improvement on right temporal area, improvement in left temporal, frontal and vertex area   - No other lesions of concern on areas examined.     Medications:  Current Outpatient Medications   Medication     clobetasol (TEMOVATE) 0.05 % external solution     clobetasol propionate (CLOBEX) 0.05 % external shampoo     desogestrel-ethinyl estradiol (JOSS, VELIVET) 0.1/0.125/0.15 -0.025 MG per tablet     Fluocinolone Acetonide Scalp 0.01 % OIL oil     ibuprofen (ADVIL/MOTRIN) 200 MG tablet     ketoconazole (NIZORAL) 2 % external shampoo     ondansetron (ZOFRAN) 4 MG tablet      spironolactone (ALDACTONE) 50 MG tablet     triamcinolone (KENALOG) 0.1 % ointment     No current facility-administered medications for this visit.      Past Medical History:   Patient Active Problem List   Diagnosis     Alopecia     Dermatitis, seborrheic     Loss of hair     Low iron stores     Past Medical History:   Diagnosis Date     Eczema      Nickel allergy     identified by allergy to belt buckle     Uncomplicated asthma        CC Fly Whaley MD  [address] [phone] on close of this encounter.        Again, thank you for allowing me to participate in the care of your patient.      Sincerely,    Velvet Huston MD

## 2022-05-09 NOTE — PROGRESS NOTES
[facility] Dermatology Note  Encounter Date: May 9, 2022  Office Visit     Dermatology Problem List:  1. Non-scarring alopecia: ddx psoriatic alopecia vs AA, now favoring female pattern hair loss, stable  - Scalp biopsy 1/2017 most consistent with psoriatic alopecia vs. alopecia areata, initially did not have any evidence of psoriasis elsewhere on body but rash on bilateral ears noted 5/7/18    - ILK 10 mg/cc 4/2017 and 5/2017 without much improvement.  - Patient stopped clobetasol 0.05% shampoo due to high cost  - Current Tx: alternate ketoconazole 2% shampoo and DHS zinc shampoo daily, laser comb TIW, Rogaine 5% foam daily, daily multivitamin, spironolactone 150 mg daily  2. Seborrheic Dermatitis  - Current Tx: derma-smoothe/fs oil 1-2x weekly for the winter months  3. History of dermatitis, behind ears  - s/p 0.1% Triamcinolone ointment (when needed)  4. History of low iron stores  - WNL 9/3/2020 and 11/2021  5. 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 Non-scarring alopecia: presently favoring female pattern hair loss; previously ddx with psoriatic alopecia vs AA. Overall stable to improved compared to last visit.     Continue spironolactone 150 mg daily.    Continue Head and Shoulders     Alternate use of 2% ketoconazole shampoo and Head and Shoulders     Continue with Rogaine 5% foam daily    Continue with laser comb three times weekly    Previously discussed PRP with patient. She is potentially interested in the future but would consider oral minoxidil in the future before trying PRP.      2. Seborrheic Dermatitis  - Use Derma-Smoothe/FS (fluocinolone acetonide) oil application in the winter.     3. Fibrous Histiocytoma   S/p MMS 3/7/22. Follws with Dr. Ocampo with upcoming post-procedure follow-up as planned       Procedures Performed:   Hair metrix    Follow-up: 6 month(s) in-person, or earlier for new or  changing lesions    Staff and Resident:   Dr. Huston (staff dermatologist)  Dr. Hampton (IM PGY-3)    Patient was seen by and discussed with Dr. Huston (staff dermatologist)    Judy Hampton MD  IM PGY-3    Patient was seen and examined with the medicine resident. I agree with the history, review of systems, physical examination, assessments and plan.    Velvet Huston MD  Professor and  Chair  Department of Dermatology  [facility]  ____________________________________________    CC: Hair loss follow-up    HPI:  Ms. Daily Vyas is a 26 year old female who presents as a return patient for follow-up of hair loss, diagnosed as non-scarring alopecia.   - Last seen in-clinic on 21  - Shedding or thinning, or both: both  - Current tx: spironolactone 150 mg daily, DHS zinc shampoo, Rogaine 5% foam, LLLT 2x/week. Stopped using ketoconazole 2% shampoo at time of Mohs surgery due to concern about it causing issues with her incision.   - She washes hair once daily with DHS zinc shampoo   - She stopped taking her MV with iron (no side effects from it)  - She is tolerating her spironolactone well  - She feels like her hair thinning and shedding was overall stable. In the last 2-3 weeks she has noted some increased diffuse shedding. She is concerned this is secondary to stress from her recent Mohs surgery which was completed on 3/7/22  - If using Rogaine, 1 cannister lasts how lon-5 weeks   - Scalp or hair care habits/products: no other treatments or specific habits   No Any new medications, supplements, or products? (please list below)     No Scalp pain   No Scalp burning   No Scalp itching    Possibly Eyebrow changes; thought that her left eyebrow may have become elongated with her incision after her Mohs surgery    No Eyelash changes   No Beard changes    No Other body hair changes    No Nail changes    No Additional symptoms? (please list below)     - Overall course: Stable. She felt like  hair loss was stable. Did have some increase in shedding las 2-3 weeks which she thought was due to stress from recent Mohs surgery.   - COVID status: no    Patient is otherwise feeling well, in usual state of health, and has no additional skin concerns today.     Labs:  Iron studies  reviewed; last ferritin WNL in 11/2021    Physical Exam:  Vitals: There were no vitals taken for this visit.  GEN: Well developed, well-nourished, in no acute distress, in a pleasant mood.    SKIN: Focused examination of scalp, face and nails was performed.  - Hammad part width of 1.2 throughout   - No pendleton   - The layers of hair regrowth layers were noted to be  - 1-2 cm for the first robust  - 4-5 cm at the second robust  - 7-8 cm at the third  - no diffuse erythema   - no perifollicular erythema  - no perifollicular scale   - no scaling of the scalp   - negative hair pull test   - normal eyelash density  - normal eyebrow density  - no nail pitting or dystrophy   - scalp folliculitis/pustules   - In comparison to prior photographs, significant improvement on right temporal area, improvement in left temporal, frontal and vertex area   - No other lesions of concern on areas examined.     Medications:  Current Outpatient Medications   Medication     clobetasol (TEMOVATE) 0.05 % external solution     clobetasol propionate (CLOBEX) 0.05 % external shampoo     desogestrel-ethinyl estradiol (JOSS, VELIVET) 0.1/0.125/0.15 -0.025 MG per tablet     Fluocinolone Acetonide Scalp 0.01 % OIL oil     ibuprofen (ADVIL/MOTRIN) 200 MG tablet     ketoconazole (NIZORAL) 2 % external shampoo     ondansetron (ZOFRAN) 4 MG tablet     spironolactone (ALDACTONE) 50 MG tablet     triamcinolone (KENALOG) 0.1 % ointment     No current facility-administered medications for this visit.      Past Medical History:   Patient Active Problem List   Diagnosis     Alopecia     Dermatitis, seborrheic     Loss of hair     Low iron stores     Past Medical History:    Diagnosis Date     Eczema      Nickel allergy     identified by allergy to [facility]     Uncomplicated asthma        CC Fly Whaley MD  [address] [place] [address] on close of this encounter.

## 2022-05-09 NOTE — PROCEDURES
HairMetrix Summary (5/9/2022)     Frontal anterior    Mid scalp    Vertex     Occipital    Right temporal    Left temporal    Summary

## 2022-05-10 ENCOUNTER — OFFICE VISIT (OUTPATIENT)
Dept: DERMATOLOGY | Facility: CLINIC | Age: 27
End: 2022-05-10
Payer: COMMERCIAL

## 2022-05-10 DIAGNOSIS — Z51.89 VISIT FOR WOUND CHECK: Primary | ICD-10-CM

## 2022-05-10 PROCEDURE — 99024 POSTOP FOLLOW-UP VISIT: CPT | Performed by: DERMATOLOGY

## 2022-05-10 ASSESSMENT — PAIN SCALES - GENERAL: PAINLEVEL: NO PAIN (0)

## 2022-05-10 NOTE — PROCEDURES
Laser- VBeam(Pulsed Dye Laser) Procedure Note: Medical    Procedure Date: May 10, 2022    Attending Staff Surgeon: Dr. cOampo    Resident Surgeon: None    Assistant: Nevaeh Lao RN    Operating Room Data:     Surgery/Procedure Date:    SAME     Pre-operative Diagnosis:   Scar  Location: Left cheek  Size(cm2): 7 x 7 cm  Number of lesions: 1     Operation/Procedure    Vbeam pulsed dye laser treatment#: 1     Post-operative Diagnosis:  SAME    Laser Settings:  Energy:6.5 J/cm2  Spot size:10mm  Pulse width:  10 mS (0.45 thru 40 mS)  Dynamic cooling spray settin mS  Dynamic cooling device delay:  20 mS      Fotofinder photos: No    Anesthesia:  None    Description of Operation/Procedure:   The nature and purpose of the procedure, associated risks, possible consequences, complications and alternative methods of treatment were explained in detail, this includes but is not limited to hyperpigmentation, hypopigmentation, scarring, bruising, hair loss pain/discomfort, eye injury, hair loss, and blister. We reviewed that the outcome could be any of the following: no improvement, slight improvement or change in skin color & texture, the skin might be permanently lighter or darker, and though uncommon, superficial scarring may occur.  Multiple treatments may be recommended.     A photo and operative consent were obtained. Time-out was performed.The patient was positioned to optimally expose the area treated. Protective eyewear was worn by the patient and goggles on all personnel in the treatment room. The patient confirmed the site to be treated. The laser energy output was verified by meter reading.      The clinically evident lesion(s) was/were treated with Kathrin Vbeam pulsed dye laser (595 nm) beam as above. The patient tolerated the procedure well and no complications were noted. Post operative instructions were provided. The total laser operation and preparation time was 10 minutes.  The patient was counseled to  call immediately for any issues and read the after visit summary for emergency contact information. The patient was counseled that mychart messaging response may be delayed by several days, therefore, phone is preferred for emergency issues.     The patient will follow-up in 3 months.    The patient will NOT pay the cosmetic fee today.       Staff Involved:  Scribe/Staff    Scribe Disclosure:  Christelle SENIOR, am serving as a scribe to document services personally performed by Kevin Ocampo MD based on data collection and the provider's statements to me.

## 2022-05-10 NOTE — PATIENT INSTRUCTIONS
Pulse Dye Laser (PDL)    I will experience redness, swelling, pain, and heat sensation. I may experience bruising, itching, or acne. Risks are blistering, oozing, permanent scarring, hair loss, temporary or permanent skin lightening or darkening, infection, and eye injury. I understand my outcome could be no improvement, slight improvement. Multiple treatments may be required.    After treatment, Do Not:  Rub, scratch, or put weight on the site for 2 weeks  Wear tight fitting clothing or jewelry over the site  Saab. Keep the site out of sunlight. Use sunscreen of 30 SPF or greater when in the sun. Use sunscreen 30 minutes before going out and reapply if sweating. Tanning decreases the success of the treatment    How do I care for the treated site?  Use ice packs for 10 minutes after the procedure for swelling   If the site is on your face, use ice again 1 hour after treatment for ten minutes and repeat again before bed. Do not burn the skin with the ice.   If a scab or crust forms, gently cleanse the site with water. Then put on Vaseline  ointment 3 times a day and contact the clinic   If a blister forms, contact the clinic  If you have concerns about swelling, call the clinic  Do not use makeup on any open wound    What should I expect?  Mild swelling  Blue-purple color that may take 2 to 3 weeks to go away  Redness may also last a week or longer  Results may take up to 3 or 4 months after treatment  More procedures may be needed    Who should I call with questions?  [facility]: [phone]  [facility]: [phone]  For urgent needs outside of business hours call the [facility] at [phone] and ask for the dermatology resident on call  [employer] messaging response may be delayed

## 2022-05-10 NOTE — PROGRESS NOTES
Dermatologic Surgery Clinic Note    May 10, 2022    Dermatology Problem List:  1. Non-scarring alopecia: ddx psoriatic alopecia vs AA, now favoring female pattern hair loss, stable  - Scalp biopsy 1/2017 most consistent with psoriatic alopecia vs. alopecia areata, initially did not have any evidence of psoriasis elsewhere on body but rash on bilateral ears noted 5/7/18    - ILK 10 mg/cc 4/2017 and 5/2017 without much improvement.  - Patient stopped clobetasol 0.05% shampoo due to high cost  - Current Tx: alternate ketoconazole 2% shampoo and DHS zinc shampoo daily, laser comb TIW, Rogaine 5% foam daily, daily multivitamin, spironolactone 150 mg daily  2. Seborrheic Dermatitis  - Current Tx: derma-smoothe/fs oil 1-2x weekly for the winter months  3. History of dermatitis, behind ears  - s/p 0.1% Triamcinolone ointment (when needed)  4. History of low iron stores  - WNL 9/3/2020 and 11/2021  5. 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 No concerns for infection today. The patient continues with daily wound cares as recommended.    No other associated symptoms, modifying factors, or prior treatments, except when noted above. The patient denies any constitutional symptoms, lymphadenopathy, unintentional weight loss or decreased appetite. No other skin concerns today.    Objective:   Gen: This is a well appearing individual in no acute distress. The patient is alert and oriented x 3.  An exam of the left cheek was performed today and visualized the following:  - Well healing advancement flap on the left cheek after mohs for large cellular DF that extended through the facial musculature.    Assessment and Plan:     # Fibrous Histiocytoma, left cheek s/p MMS 3/7/22.  - PDL today. See procedure note.  - The patient's surgery site(s) is/are healing very well. No evidence of infection  on examination today.  - The patient was told to continue with wound cares until the area(s) is/are no longer crusted.   - The patient should follow up with dermatologic surgery in 3 months. Plan for repeat PDL at that time.  - Patient has some expected movement deficits due to invasion of tumor into her lip elevators.    Scribe Disclosure:  I, Christelle Gonzalez, am serving as a scribe to document services personally performed by Kevin Ocampo MD based on data collection and the provider's statements to me.     Attending Attestation  I attest that the Scribe recorded the interview and exam that I personally performed.  I have reviewed the note and edited it as necessary.    Kevin Ocampo M.D.  Professor  Director of Dermatologic Surgery  Department of Dermatology  [facility]

## 2022-05-10 NOTE — LETTER
Date:May 11, 2022      Provider requested that no letter be sent. Do not send.       [facility]

## 2022-05-10 NOTE — NURSING NOTE
Chief Complaint   Patient presents with     Derm Problem     Patient is here for a 6 week wound check on the left cheek.     Nevaeh PERKINS RN

## 2022-05-16 ENCOUNTER — HEALTH MAINTENANCE LETTER (OUTPATIENT)
Age: 27
End: 2022-05-16

## 2022-07-12 ENCOUNTER — OFFICE VISIT (OUTPATIENT)
Dept: DERMATOLOGY | Facility: CLINIC | Age: 27
End: 2022-07-12
Payer: COMMERCIAL

## 2022-07-12 DIAGNOSIS — L90.5 SCAR: Primary | ICD-10-CM

## 2022-07-12 DIAGNOSIS — Z51.89 VISIT FOR WOUND CHECK: ICD-10-CM

## 2022-07-12 PROCEDURE — 99024 POSTOP FOLLOW-UP VISIT: CPT | Performed by: DERMATOLOGY

## 2022-07-12 ASSESSMENT — PAIN SCALES - GENERAL: PAINLEVEL: NO PAIN (0)

## 2022-07-12 NOTE — PATIENT INSTRUCTIONS
Pulsed Dye Laser (PDL)    I will experience redness, swelling, pain, and heat sensation. I may experience bruising, itching, or acne. Risks are blistering, oozing, permanent scarring, hair loss, temporary or permanent skin lightening or darkening, infection, and eye injury. I understand my outcome could be no improvement, slight improvement. Multiple treatments may be required.    After treatment, Do Not:  Rub, scratch, or put weight on the site for 2 weeks  Wear tight fitting clothing or jewelry over the site  Saab. Keep the site out of sunlight. Use sunscreen of 30 SPF or greater when in the sun. Use sunscreen 30 minutes before going out and reapply if sweating. Tanning decreases the success of the treatment    How do I care for the treated site?  Use ice packs for 10-20 minutes after the procedure for swelling   If the site is on your face, use ice again 1 hour after treatment for ten minutes and repeat again before bed. Do not burn the skin with the ice.   If a scab or crust forms, gently cleanse the site with water. Then put on Vaseline  ointment 3 times a day and contact the clinic   If a blister forms, contact the clinic by phone  If you have concerns about swelling, call the clinic  Avoid sun exposure and do not get tan as this can darken the treated area, use sunscreen  Do not use makeup on any open wound  Do not come to your next treatment with a tan    What should I expect?  Mild swelling  Blue-purple color that may take 2 to 3 weeks to go away  Redness may also last a week or longer  Results may take up to 3 or 4 months after treatment  More procedures may be needed    Who should I call with questions?  [facility]: [phone]  [facility]: [phone]  For urgent needs outside of business hours call the [facility] at [phone] and ask for the dermatology resident on call  [employer] messaging response may be delayed, please call for  urgent issues

## 2022-07-12 NOTE — LETTER
7/12/2022       RE: Daily Vyas  [address] [facility] [id number]     Dear Colleague,    Thank you for referring your patient, Daily Vyas, to the [facility] DERMATOLOGIC SURGERY CLINIC [place] at [facility]. Please see a copy of my visit note below.    See other note.    CORE Procedure: Medical    Procedure Date: 07/12/2022  Staff: Kevin Ocampo MD  Resident/Fellow:  Kong Carrington MD    Procedure:   CO2RE, fractional CO2 ablative laser treatment #  1  Approximate treatment area: 12 cm sq  Approximate length of treatment: 15 minutes    Anesthesia and Premedication:  Anesthesia (topical): Benzocaine 20%/Lidocaine 8%/Tetracaine 4% ointment    Device Settings:  Diagnosis:   Location: left cheek  Mode(class/deep/mid/etc): deep  Frational coverage(%): 5  Core(mJ): 70      Fotofinder photos: No    Description of Procedure:   The nature and purpose of the procedure, associated risks, possible consequences, complications, and alternative methods of treatment were explained in detail including but not limited to redness, swelling, peeling of the skin, eye injury, infection, bleeding, oozing, heat sensation, itching or acne.  Possible outcomes were reviewed including the following: no improvement, slight improvement, skin lightening or darkening. The possibility of permanent scarring was reviewed. Discussion that multiple treatments may be required was completed.     Photo consent was obtained and reviewed, a time out was performed, and informed consent was obtained.  The area was cleansed with [employer]. Protective eyewear was worn by the patient and all personnel in the treatment room  The patient confirmed the site to be treated. The laser energy output was verified by meter reading. The [employer] cooler and smoke evacuator devices were operated coincident with the CO2RE laser. The areas were treated with CO2RE laser as described. The patient  tolerated the procedure well and no complications were noted. Verbal and written aftercare instructions were provided. Post procedure care including use of mild, gentle cleansers and moisturizers  for the first week following treatment was reviewed. The patient was recommend application of at least SPF 30 sunscreen daily  and avoidance of direct sunlight up to 3 months post procedure. The patient was discharged from the dermatology clinic in good condition.    The patient will follow up with nursing in 48 hours and with MD in 6-8 weeks.     Staff Involved:  Resident/Staff     Kong Carrington MD  Dermatology, PGY-5  Mohs surgery fellow    Attestation signed by Kevin Ocampo MD at 7/18/2022  9:46 AM:    Attending attestation:  I was present for key elements of the procedure and immediately available for all other portions of the procedure.  I have reviewed the note and edited it as necessary.    Kevin Ocampo M.D.  Professor  Director of Dermatologic Surgery  Department of Dermatology  [facility]    Dermatology Surgery Clinic  [facility] [place] [address]        Again, thank you for allowing me to participate in the care of your patient.      Sincerely,    Kevin Ocampo MD

## 2022-07-12 NOTE — LETTER
Date:July 18, 2022      Provider requested that no letter be sent. Do not send.       [facility]

## 2022-07-12 NOTE — NURSING NOTE
Dermatology Rooming Note    Daily Vyas's goals for this visit include:   Chief Complaint   Patient presents with     Laser Treatment     Daily is here to follow up for PDL on left cheek.     Dermatology Laser Intake Checklist:  History of psoriasis: No  History of recent tan, indoor or outdoor tanning/vacation or other sun exposure: No  History of vitiligo: No  Family history of vitiligo: No  Recent other cosmetic procedure(microderm abrasion/peel/hair removal/facial etc): No  History of HSV: No  Did the patient start valtrex: No  For genital laser hair removal patient only: Is there a history of genital warts or condyloma: N/A  Tattoo in the area to be treated: No  Is patient using hydroquinone: No  Retinoids and other acne medications stopped for 2 weeks: No  Has the patient had accutane in the last 6-12 months: No  Pregnant or breastfeeding: No  History of skin cancer in area planned for treatment: No  History of treatment with gold: No  Changes in medical history: No  Photos obtained: Yes  Does the patient smoke: No  Is the patient on ibuprofen/aspirin/plavix/coumadin/other blood thinner: No  If patient is taking narcotic or diazepam(valium)-does patient have : No  There were no vitals taken for this visit.   .Alesia Beltran, Facilitator

## 2022-07-13 NOTE — PROGRESS NOTES
CORE Procedure: Medical    Procedure Date: 07/12/2022  Staff: Kevin Ocampo MD  Resident/Fellow:  Kong Carrington MD    Procedure:   CO2RE, fractional CO2 ablative laser treatment #  1  Approximate treatment area: 12 cm sq  Approximate length of treatment: 15 minutes    Anesthesia and Premedication:  Anesthesia (topical): Benzocaine 20%/Lidocaine 8%/Tetracaine 4% ointment    Device Settings:  Diagnosis:   Location: left cheek  Mode(class/deep/mid/etc): deep  Frational coverage(%): 5  Core(mJ): 70      Fotofinder photos: No    Description of Procedure:   The nature and purpose of the procedure, associated risks, possible consequences, complications, and alternative methods of treatment were explained in detail including but not limited to redness, swelling, peeling of the skin, eye injury, infection, bleeding, oozing, heat sensation, itching or acne.  Possible outcomes were reviewed including the following: no improvement, slight improvement, skin lightening or darkening. The possibility of permanent scarring was reviewed. Discussion that multiple treatments may be required was completed.     Photo consent was obtained and reviewed, a time out was performed, and informed consent was obtained.  The area was cleansed with [employer]. Protective eyewear was worn by the patient and all personnel in the treatment room  The patient confirmed the site to be treated. The laser energy output was verified by meter reading. The [employer] cooler and smoke evacuator devices were operated coincident with the CO2RE laser. The areas were treated with CO2RE laser as described. The patient tolerated the procedure well and no complications were noted. Verbal and written aftercare instructions were provided. Post procedure care including use of mild, gentle cleansers and moisturizers  for the first week following treatment was reviewed. The patient was recommend application of at least SPF 30 sunscreen daily  and avoidance of direct sunlight  up to 3 months post procedure. The patient was discharged from the dermatology clinic in good condition.    The patient will follow up with nursing in 48 hours and with MD in 6-8 weeks.     Staff Involved:  Resident/Staff     Kong Carrington MD  Dermatology, PGY-5  Mohs surgery fellow

## 2022-09-06 ENCOUNTER — OFFICE VISIT (OUTPATIENT)
Dept: DERMATOLOGY | Facility: CLINIC | Age: 27
End: 2022-09-06
Payer: COMMERCIAL

## 2022-09-06 DIAGNOSIS — Z48.89 ENCOUNTER FOR POSTOPERATIVE WOUND CHECK: ICD-10-CM

## 2022-09-06 DIAGNOSIS — L90.5 SCAR: Primary | ICD-10-CM

## 2022-09-06 PROCEDURE — 99024 POSTOP FOLLOW-UP VISIT: CPT | Mod: GC | Performed by: DERMATOLOGY

## 2022-09-06 ASSESSMENT — PAIN SCALES - GENERAL: PAINLEVEL: NO PAIN (0)

## 2022-09-06 NOTE — LETTER
Date:September 8, 2022      Provider requested that no letter be sent. Do not send.       [facility]

## 2022-09-06 NOTE — LETTER
2022       RE: Daily Vyas  [address] [facility] [address]     Dear Colleague,    Thank you for referring your patient, Daily Vyas, to the [facility] DERMATOLOGIC SURGERY CLINIC [place] at [facility]. Please see a copy of my visit note below.    Laser- VBeam(Pulsed Dye Laser) Procedure Note: Medical      Dermatology Problem List:  1. Non-scarring alopecia: ddx psoriatic alopecia vs AA, now favoring female pattern hair loss, stable  - Scalp biopsy 2017 most consistent with psoriatic alopecia vs. alopecia areata, initially did not have any evidence of psoriasis elsewhere on body but rash on bilateral ears noted 18    - ILK 10 mg/cc 2017 and 2017 without much improvement.  - Patient stopped clobetasol 0.05% shampoo due to high cost  - Current Tx: alternate ketoconazole 2% shampoo and DHS zinc shampoo daily, laser comb TIW, Rogaine 5% foam daily, daily multivitamin, spironolactone 150 mg daily  2. Seborrheic Dermatitis  - Current Tx: derma-smoothe/fs oil 1-2x weekly for the winter months  3. History of dermatitis, behind ears  - s/p 0.1% Triamcinolone ointment (when needed)  4. History of low iron stores  - WNL 9/3/2020 and 2021  5. 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    Procedure Date: Sep 6, 2022     Attending Staff Surgeon: Kevin Ocampo MD    Fellow Surgeon: Kong Carrington    Assistant: Charu Lynne CMA    Operating Room Data:     Surgery/Procedure Date:    SAME     Pre-operative Diagnosis:   Scar  Location: left cheek  Size(cm2): 7 x 7 cm  Number of lesions: 1    Operation/Procedure    Vbeam pulsed dye laser treatment#: 2     Post-operative Diagnosis:  SAME    Laser Settings:  Energy:6.5 J/cm2  Spot size:10mm  Pulse width:  10 mS (0.45 thru 40 mS)  Dynamic cooling spray settin mS  Dynamic cooling device delay:  20 mS      Fotofinder photos: No    Anesthesia:  None.    Description of Operation/Procedure:    The nature and purpose of the procedure, associated risks, possible consequences, complications and alternative methods of treatment were explained in detail, this includes but is not limited to hyperpigmentation, hypopigmentation, scarring, bruising, hair loss pain/discomfort, eye injury, hair loss,  and blister. We reviewed that the outcome could be any of the following: no improvement, slight improvement or change in skin color & texture, the skin might be permanently lighter or darker, and though uncommon, superficial scarring may occur.  Multiple treatments may be recommended.     A photo and operative consent were obtained. Time-out was performed.The patient was positioned to optimally expose the area treated. Protective eyewear was worn by the patient and goggles on all personnel in the treatment room. The patient confirmed the site to be treated. The laser energy output was verified by meter reading.      The clinically evident lesion(s) was/were treated with Kathrin Vbeam pulsed dye laser (595 nm) beam as above. A total of 15 pulses were used. The patient tolerated the procedure well and no complications were noted. Post operative instructions were provided. The total laser operation and preparation time was 10 minutes. The patient was counseled to call immediately for any issues and read the after visit summary for emergency contact information. The patient was counseled that [employer] messaging response may be delayed by several days, therefore, phone is preferred for emergency issues.     The patient will follow-up in 6-8 weeks for repeat PDL vs CO2 laser treatment.    The patient will NOT pay the cosmetic fee today.     Dr. Kevin Ocampo staffed the patient was present for the entire procedure.    Kong Carrington MD  Dermatology, PGY-5  Mohs surgery fellow    Staff Involved:  Scribe/Staff    Scribe Disclosure:  BEATRIZ SENIOR, am serving as a scribe to document services personally performed by Kevin Ocampo,  MD based on data collection and the provider's statements to me.       Attending attestation:  I was present for key elements of the procedure and immediately available for all other portions of the procedure.  I have reviewed the note and edited it as necessary.    Kevin Ocampo M.D.  Professor  Director of Dermatologic Surgery  Department of Dermatology  [facility]    Dermatology Surgery Clinic  [facility] [place] [address]          Again, thank you for allowing me to participate in the care of your patient.      Sincerely,    Kevin Ocampo MD

## 2022-09-06 NOTE — PROGRESS NOTES
Laser- VBeam(Pulsed Dye Laser) Procedure Note: Medical      Dermatology Problem List:  1. Non-scarring alopecia: ddx psoriatic alopecia vs AA, now favoring female pattern hair loss, stable  - Scalp biopsy 2017 most consistent with psoriatic alopecia vs. alopecia areata, initially did not have any evidence of psoriasis elsewhere on body but rash on bilateral ears noted 18    - ILK 10 mg/cc 2017 and 2017 without much improvement.  - Patient stopped clobetasol 0.05% shampoo due to high cost  - Current Tx: alternate ketoconazole 2% shampoo and DHS zinc shampoo daily, laser comb TIW, Rogaine 5% foam daily, daily multivitamin, spironolactone 150 mg daily  2. Seborrheic Dermatitis  - Current Tx: derma-smoothe/fs oil 1-2x weekly for the winter months  3. History of dermatitis, behind ears  - s/p 0.1% Triamcinolone ointment (when needed)  4. History of low iron stores  - WNL 9/3/2020 and 2021  5. 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    Procedure Date: Sep 6, 2022     Attending Staff Surgeon: Kevin Ocampo MD    Fellow Surgeon: Kong Carrington    Assistant: Charu Lynne CMA    Operating Room Data:     Surgery/Procedure Date:    SAME     Pre-operative Diagnosis:   Scar  Location: left cheek  Size(cm2): 7 x 7 cm  Number of lesions: 1    Operation/Procedure    Vbeam pulsed dye laser treatment#: 2     Post-operative Diagnosis:  SAME    Laser Settings:  Energy:6.5 J/cm2  Spot size:10mm  Pulse width:  10 mS (0.45 thru 40 mS)  Dynamic cooling spray settin mS  Dynamic cooling device delay:  20 mS      Fotofinder photos: No    Anesthesia:  None.    Description of Operation/Procedure:   The nature and purpose of the procedure, associated risks, possible consequences, complications and alternative methods of treatment were explained in detail, this includes but is not limited to hyperpigmentation, hypopigmentation, scarring, bruising, hair loss pain/discomfort, eye injury, hair loss,  and blister. We reviewed  that the outcome could be any of the following: no improvement, slight improvement or change in skin color & texture, the skin might be permanently lighter or darker, and though uncommon, superficial scarring may occur.  Multiple treatments may be recommended.     A photo and operative consent were obtained. Time-out was performed.The patient was positioned to optimally expose the area treated. Protective eyewear was worn by the patient and goggles on all personnel in the treatment room. The patient confirmed the site to be treated. The laser energy output was verified by meter reading.      The clinically evident lesion(s) was/were treated with Kathrin Vbeam pulsed dye laser (595 nm) beam as above. A total of 15 pulses were used. The patient tolerated the procedure well and no complications were noted. Post operative instructions were provided. The total laser operation and preparation time was 10 minutes. The patient was counseled to call immediately for any issues and read the after visit summary for emergency contact information. The patient was counseled that [employer] messaging response may be delayed by several days, therefore, phone is preferred for emergency issues.     The patient will follow-up in 6-8 weeks for repeat PDL vs CO2 laser treatment.    The patient will NOT pay the cosmetic fee today.     Dr. Kevin Ocampo staffed the patient was present for the entire procedure.    Kong Carrington MD  Dermatology, PGY-5  Mohs surgery fellow    Staff Involved:  Scribe/Staff    Scribe Disclosure:  IBEATRIZ, am serving as a scribe to document services personally performed by Kevin Ocampo MD based on data collection and the provider's statements to me.       Attending attestation:  I was present for key elements of the procedure and immediately available for all other portions of the procedure.  I have reviewed the note and edited it as necessary.    Kevin Ocampo M.D.  Professor  Director of Dermatologic  Surgery  Department of Dermatology  [facility]    Dermatology Surgery Clinic  [facility]  [address] [place] [id number]

## 2022-09-11 ENCOUNTER — HEALTH MAINTENANCE LETTER (OUTPATIENT)
Age: 27
End: 2022-09-11

## 2022-10-31 ENCOUNTER — OFFICE VISIT (OUTPATIENT)
Dept: DERMATOLOGY | Facility: CLINIC | Age: 27
End: 2022-10-31
Payer: COMMERCIAL

## 2022-10-31 DIAGNOSIS — L90.5 SCAR: Primary | ICD-10-CM

## 2022-10-31 DIAGNOSIS — Z48.89 ENCOUNTER FOR POSTOPERATIVE WOUND CHECK: ICD-10-CM

## 2022-10-31 PROCEDURE — 99024 POSTOP FOLLOW-UP VISIT: CPT | Mod: GC | Performed by: DERMATOLOGY

## 2022-10-31 NOTE — LETTER
10/31/2022       RE: Daily Vyas  [address] [facility] [address]     Dear Colleague,    Thank you for referring your patient, Daily Vyas, to the [facility] DERMATOLOGIC SURGERY CLINIC [place] at [facility]. Please see a copy of my visit note below.    Laser- VBeam(Pulsed Dye Laser) Procedure Note: Medical      Dermatology Problem List:  1. Non-scarring alopecia: ddx psoriatic alopecia vs AA, now favoring female pattern hair loss, stable  - Scalp biopsy 2017 most consistent with psoriatic alopecia vs. alopecia areata, initially did not have any evidence of psoriasis elsewhere on body but rash on bilateral ears noted 18    - ILK 10 mg/cc 2017 and 2017 without much improvement.  - Patient stopped clobetasol 0.05% shampoo due to high cost  - Current Tx: alternate ketoconazole 2% shampoo and DHS zinc shampoo daily, laser comb TIW, Rogaine 5% foam daily, daily multivitamin, spironolactone 150 mg daily  2. Seborrheic Dermatitis  - Current Tx: derma-smoothe/fs oil 1-2x weekly for the winter months  3. History of dermatitis, behind ears  - s/p 0.1% Triamcinolone ointment (when needed)  4. History of low iron stores  - WNL 9/3/2020 and 2021  5. 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    Procedure Date: Oct 31, 2022    Attending Staff Surgeon: Kevin Ocampo MD    Fellow Surgeon: Kong Carrington MD    Assistant: Charu Lynne CMA    Operating Room Data:     Surgery/Procedure Date:    SAME     Pre-operative Diagnosis:   Scar  Location: left cheek  Size(cm2): 7 x 7 cm  Number of lesions: 1    Operation/Procedure    Vbeam pulsed dye laser treatment#: 3     Post-operative Diagnosis:  SAME    Laser Settings:  Cheek:  Energy: 6.5 J/cm2  Spot size:10mm  Pulse width: 10 mS (0.45 thru 40 mS)  Dynamic cooling spray settin mS  Dynamic cooling device delay:  20 mS    Lateral to Burrow's graft:  Energy: 11 J/cm2  Spot size: 3x10 mm  Pulse  width: 10 mS (0.45 thru 40 mS)  Dynamic cooling spray settin mS  Dynamic cooling device delay:  20 m      Fotofinder photos: No    Anesthesia:  None    Description of Operation/Procedure:   The nature and purpose of the procedure, associated risks, possible consequences, complications and alternative methods of treatment were explained in detail, this includes but is not limited to hyperpigmentation, hypopigmentation, scarring, bruising, hair loss pain/discomfort, eye injury, hair loss,  and blister. We reviewed that the outcome could be any of the following: no improvement, slight improvement or change in skin color & texture, the skin might be permanently lighter or darker, and though uncommon, superficial scarring may occur.  Multiple treatments may be recommended.     A photo and operative consent were obtained. Time-out was performed.The patient was positioned to optimally expose the area treated. Protective eyewear was worn by the patient and goggles on all personnel in the treatment room. The patient confirmed the site to be treated. The laser energy output was verified by meter reading.      The clinically evident lesion(s) was/were treated with Kathrin Vbeam pulsed dye laser (595 nm) beam as above. A total of 20 pulses were used. The patient tolerated the procedure well and no complications were noted. Post operative instructions were provided. The total laser operation and preparation time was 10 minutes.  The patient was counseled to call immediately for any issues and read the after visit summary for emergency contact information.     The patient will follow-up in 6-8 weeks.    The patient will NOT pay the cosmetic fee today.     Dr. Ocampo staffed the patient was present for the entire procedure.    Kong Carrington MD  Dermatology, PGY-5  Mohs surgery fellow    Staff Involved:  Scribe/Staff    Scribe Disclosure:  BEATRIZ SENIOR, am serving as a scribe to document services personally performed by  Kevin Ocampo MD based on data collection and the provider's statements to me.       Attending attestation:  I was present for key elements of the procedure and immediately available for all other portions of the procedure.  I have reviewed the note and edited it as necessary.    Kevin Ocampo M.D.  Professor  Director of Dermatologic Surgery  Department of Dermatology  [facility]    Dermatology Surgery Clinic  [facility] [place] [address]          Again, thank you for allowing me to participate in the care of your patient.      Sincerely,    Kevin Ocampo MD

## 2022-10-31 NOTE — LETTER
Date:November 4, 2022      Provider requested that no letter be sent. Do not send.       [facility]

## 2022-10-31 NOTE — NURSING NOTE
Dermatology Laser Intake Checklist:  History of psoriasis: No  History of recent tan, indoor or outdoor tanning/vacation or other sun exposure: No  History of vitiligo: No  Family history of vitiligo: No  Recent other cosmetic procedure(microderm abrasion/peel/hair removal/facial etc): No  History of HSV: N/A  Did the patient start valtrex: N/A  For genital laser hair removal patient only: Is there a history of genital warts or condyloma: N/A  Tattoo in the area to be treated: No  Is patient using hydroquinone: No  Retinoids and other acne medications stopped for 2 weeks: No  Has the patient had accutane in the last 6-12 months: No  Pregnant or breastfeeding: No  History of skin cancer in area planned for treatment: Yes  History of treatment with gold: No  Changes in medical history: No  Photos obtained: Yes  Does the patient smoke: No  Is the patient on ibuprofen/aspirin/plavix/coumadin/other blood thinner: No  If patient is taking narcotic or diazepam(valium)-does patient have : N/A  There were no vitals taken for this visit.

## 2022-10-31 NOTE — PROGRESS NOTES
Laser- VBeam(Pulsed Dye Laser) Procedure Note: Medical      Dermatology Problem List:  1. Non-scarring alopecia: ddx psoriatic alopecia vs AA, now favoring female pattern hair loss, stable  - Scalp biopsy 2017 most consistent with psoriatic alopecia vs. alopecia areata, initially did not have any evidence of psoriasis elsewhere on body but rash on bilateral ears noted 18    - ILK 10 mg/cc 2017 and 2017 without much improvement.  - Patient stopped clobetasol 0.05% shampoo due to high cost  - Current Tx: alternate ketoconazole 2% shampoo and DHS zinc shampoo daily, laser comb TIW, Rogaine 5% foam daily, daily multivitamin, spironolactone 150 mg daily  2. Seborrheic Dermatitis  - Current Tx: derma-smoothe/fs oil 1-2x weekly for the winter months  3. History of dermatitis, behind ears  - s/p 0.1% Triamcinolone ointment (when needed)  4. History of low iron stores  - WNL 9/3/2020 and 2021  5. 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    Procedure Date: Oct 31, 2022    Attending Staff Surgeon: Kevin Ocampo MD    Fellow Surgeon: Kong Carrington MD    Assistant: Charu Lynne CMA    Operating Room Data:     Surgery/Procedure Date:    SAME     Pre-operative Diagnosis:   Scar  Location: left cheek  Size(cm2): 7 x 7 cm  Number of lesions: 1    Operation/Procedure    Vbeam pulsed dye laser treatment#: 3     Post-operative Diagnosis:  SAME    Laser Settings:  Cheek:  Energy: 6.5 J/cm2  Spot size:10mm  Pulse width: 10 mS (0.45 thru 40 mS)  Dynamic cooling spray settin mS  Dynamic cooling device delay:  20 mS    Lateral to Burrow's graft:  Energy: 11 J/cm2  Spot size: 3x10 mm  Pulse width: 10 mS (0.45 thru 40 mS)  Dynamic cooling spray settin mS  Dynamic cooling device delay:  20 m      Fotofinder photos: No    Anesthesia:  None    Description of Operation/Procedure:   The nature and purpose of the procedure, associated risks, possible consequences, complications and alternative methods of treatment were  explained in detail, this includes but is not limited to hyperpigmentation, hypopigmentation, scarring, bruising, hair loss pain/discomfort, eye injury, hair loss,  and blister. We reviewed that the outcome could be any of the following: no improvement, slight improvement or change in skin color & texture, the skin might be permanently lighter or darker, and though uncommon, superficial scarring may occur.  Multiple treatments may be recommended.     A photo and operative consent were obtained. Time-out was performed.The patient was positioned to optimally expose the area treated. Protective eyewear was worn by the patient and goggles on all personnel in the treatment room. The patient confirmed the site to be treated. The laser energy output was verified by meter reading.      The clinically evident lesion(s) was/were treated with Kathrin Vbeam pulsed dye laser (595 nm) beam as above. A total of 20 pulses were used. The patient tolerated the procedure well and no complications were noted. Post operative instructions were provided. The total laser operation and preparation time was 10 minutes.  The patient was counseled to call immediately for any issues and read the after visit summary for emergency contact information.     The patient will follow-up in 6-8 weeks.    The patient will NOT pay the cosmetic fee today.     Dr. Ocampo staffed the patient was present for the entire procedure.    Kong Carrington MD  Dermatology, PGY-5  Mohs surgery fellow    Staff Involved:  Scribe/Staff    Scribe Disclosure:  BEATRIZ SENIOR, am serving as a scribe to document services personally performed by Kevin Ocampo MD based on data collection and the provider's statements to me.       Attending attestation:  I was present for key elements of the procedure and immediately available for all other portions of the procedure.  I have reviewed the note and edited it as necessary.    Kevin Ocampo M.D.  Professor  Director of Dermatologic  Surgery  Department of Dermatology  [facility]    Dermatology Surgery Clinic  [facility]  [address] [place] [id number]

## 2022-11-07 ENCOUNTER — OFFICE VISIT (OUTPATIENT)
Dept: DERMATOLOGY | Facility: CLINIC | Age: 27
End: 2022-11-07
Payer: COMMERCIAL

## 2022-11-07 ENCOUNTER — LAB (OUTPATIENT)
Dept: LAB | Facility: CLINIC | Age: 27
End: 2022-11-07
Payer: COMMERCIAL

## 2022-11-07 VITALS — HEART RATE: 81 BPM | DIASTOLIC BLOOD PRESSURE: 74 MMHG | SYSTOLIC BLOOD PRESSURE: 120 MMHG

## 2022-11-07 DIAGNOSIS — Z51.81 MEDICATION MONITORING ENCOUNTER: Primary | ICD-10-CM

## 2022-11-07 DIAGNOSIS — L21.9 DERMATITIS, SEBORRHEIC: ICD-10-CM

## 2022-11-07 DIAGNOSIS — L65.9 LOSS OF HAIR: ICD-10-CM

## 2022-11-07 DIAGNOSIS — Z51.81 MEDICATION MONITORING ENCOUNTER: ICD-10-CM

## 2022-11-07 DIAGNOSIS — L30.9 DERMATITIS: ICD-10-CM

## 2022-11-07 LAB
ALBUMIN SERPL BCG-MCNC: 4.5 G/DL (ref 3.5–5.2)
ALP SERPL-CCNC: 81 U/L (ref 35–104)
ALT SERPL W P-5'-P-CCNC: 29 U/L (ref 10–35)
ANION GAP SERPL CALCULATED.3IONS-SCNC: 11 MMOL/L (ref 7–15)
AST SERPL W P-5'-P-CCNC: 74 U/L (ref 10–35)
BILIRUB SERPL-MCNC: 0.4 MG/DL
BUN SERPL-MCNC: 11.1 MG/DL (ref 6–20)
CALCIUM SERPL-MCNC: 9.5 MG/DL (ref 8.6–10)
CHLORIDE SERPL-SCNC: 102 MMOL/L (ref 98–107)
CREAT SERPL-MCNC: 0.62 MG/DL (ref 0.51–0.95)
DEPRECATED HCO3 PLAS-SCNC: 24 MMOL/L (ref 22–29)
FERRITIN SERPL-MCNC: 25 NG/ML (ref 6–175)
GFR SERPL CREATININE-BSD FRML MDRD: >90 ML/MIN/1.73M2
GLUCOSE SERPL-MCNC: 85 MG/DL (ref 70–99)
IRON BINDING CAPACITY (ROCHE): 368 UG/DL (ref 240–430)
IRON SATN MFR SERPL: 24 % (ref 15–46)
IRON SERPL-MCNC: 90 UG/DL (ref 37–145)
POTASSIUM SERPL-SCNC: 4 MMOL/L (ref 3.4–5.3)
PROT SERPL-MCNC: 7.2 G/DL (ref 6.4–8.3)
SODIUM SERPL-SCNC: 137 MMOL/L (ref 136–145)
TSH SERPL DL<=0.005 MIU/L-ACNC: 2.81 UIU/ML (ref 0.3–4.2)

## 2022-11-07 PROCEDURE — 36415 COLL VENOUS BLD VENIPUNCTURE: CPT | Performed by: PATHOLOGY

## 2022-11-07 PROCEDURE — 99214 OFFICE O/P EST MOD 30 MIN: CPT | Performed by: DERMATOLOGY

## 2022-11-07 PROCEDURE — 82728 ASSAY OF FERRITIN: CPT | Performed by: PATHOLOGY

## 2022-11-07 PROCEDURE — 83540 ASSAY OF IRON: CPT | Performed by: PATHOLOGY

## 2022-11-07 PROCEDURE — 83550 IRON BINDING TEST: CPT | Performed by: PATHOLOGY

## 2022-11-07 PROCEDURE — 84443 ASSAY THYROID STIM HORMONE: CPT | Performed by: PATHOLOGY

## 2022-11-07 PROCEDURE — 80053 COMPREHEN METABOLIC PANEL: CPT | Performed by: PATHOLOGY

## 2022-11-07 PROCEDURE — 99000 SPECIMEN HANDLING OFFICE-LAB: CPT | Performed by: PATHOLOGY

## 2022-11-07 RX ORDER — FLUOCINOLONE ACETONIDE 0.11 MG/ML
OIL TOPICAL
Qty: 118 ML | Refills: 1 | Status: SHIPPED | OUTPATIENT
Start: 2022-11-07

## 2022-11-07 RX ORDER — MINOXIDIL 2.5 MG/1
TABLET ORAL
Qty: 60 TABLET | Refills: 3 | Status: SHIPPED | OUTPATIENT
Start: 2022-11-07 | End: 2024-03-19

## 2022-11-07 ASSESSMENT — PAIN SCALES - GENERAL: PAINLEVEL: NO PAIN (0)

## 2022-11-07 NOTE — LETTER
11/7/2022       RE: Daily Vyas  [address] [facility] [id number]     Dear Colleague,    Thank you for referring your patient, Daily Vyas, to the [facility] [place] at [facility]. Please see a copy of my visit note below.    [facility] Dermatology Note  Encounter Date: Nov 7, 2022  Office Visit     Dermatology Problem List:  1. Non-scarring alopecia: ddx psoriatic alopecia vs AA, now favoring female pattern hair loss, stable  - Scalp biopsy 1/2017 most consistent with psoriatic alopecia vs. alopecia areata, initially did not have any evidence of psoriasis elsewhere on body but rash on bilateral ears noted 5/7/18    - ILK 10 mg/cc 4/2017 and 5/2017 without much improvement.  - Patient stopped clobetasol 0.05% shampoo due to high cost  - Current Tx: alternate ketoconazole 2% shampoo and DHS zinc shampoo daily, laser comb TIW, Rogaine 5% foam daily, daily multivitamin, spironolactone 150 mg daily    2. Seborrheic Dermatitis  - Current Tx: derma-smoothe/fs oil 1-2x weekly for the winter months    3. History of dermatitis, behind ears  - s/p 0.1% Triamcinolone ointment (when needed)    4. History of low iron stores  - WNL 9/3/2020 and 11/2021    5. 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 Non-scarring alopecia: presently favoring female pattern hair loss; previously ddx with psoriatic alopecia vs AA. Overall stable to improved compared to last visit. Discussed addition of low dose oral minoxidil and potential side effects including unwanted facial hair growth, reduced blood pressure, swelling in feet.    Continue spironolactone 150 mg daily. BP today: 139/87    Continue alternating ketoconazole 2% shampoo and Head and Shoulders     Continue with Rogaine 5% foam daily    Continue with laser comb three times weekly  -  Restart Derma-Smoothe/FS (fluocinolone acetonide) oil  - Start low dose oral minoxidil pending labs  - Labs: CMP, ferritin, Iron, TSH - wnl but ferritin is again in the low normal range, recommend daily iron supplementation with either ferrous gluconate or sulfate, 324 mg; also one liver enzyme - AST -  is elevated, recommend repeating hepatic studies  - Hairmetrix today    Future: consider PRP    Procedures Performed:   Hair Metrix:   - Frontal Scalp: 144-135   - Mid-Scalp: 168-165   - Vertex Scalp: 202-158   - Occipital Scalp: 159-169   - Right Temporal Scalp:    - Left Temporal Scalp:    Perifollicular and loose scale present     Follow-up: 4-6 month(s) in-person, or earlier for new or changing lesions    Staff and Scribe:     Scribe Disclosure:  I, Mirza Mai, am serving as a scribe to document services personally performed by Velvet Huston MD based on data collection and the provider's statements to me.     Provider Disclosure:   The documentation recorded by the scribe accurately reflects the services I personally performed and the decisions made by me.    Velvet Huston MD  Professor and Chair  Department of Dermatology  [facility]: Phone: [phone], Fax:[phone]  [facility]: Phone: [phone], Fax: [phone]      ____________________________________________    CC: Hair Loss (Follow up on hair loss; )    HPI:  Ms. Daily Vyas is a 27 year old female who presents as a return patient for follow-up of hair loss, diagnosed as non-scarring alopecia.   - Last seen in-clinic by me on 2022  - Shedding or thinning, or both: stable shedding  - Current tx: alternate ketoconazole 2% shampoo and DHS zinc shampoo daily, laser comb TIW, Rogaine 5% foam daily, daily multivitamin, spironolactone 150 mg daily  - If using Rogaine, 1 cannister lasts how lon  month  - Scalp or hair care habits/products: washes hair daily  No Any new medications, supplements, or products? (please list below)     No Scalp pain   No Scalp burning   No Scalp itching    No Eyebrow changes    No Eyelash changes   No Beard changes    No Other body hair changes    No Nail changes    No Additional symptoms? (please list below)     - Overall course: Patient reports hair loss has been stable.  - COVID status: yes, 9/2022    Patient is otherwise feeling well, in usual state of health, and has no additional skin concerns today.     Labs:  None reviewed.    Physical Exam:  Vitals: /87 (BP Location: Left arm)   Pulse 81   GEN: Well developed, well-nourished, in no acute distress, in a pleasant mood.    SKIN: Focused examination of face and scalp was performed.  - Hammad part width of 1.2 front, 1.1 mid, 1.2 vertex, thin to mid occipital scalp.  - The layers of hair regrowth layers were noted to be  - Robust 1-2 cm for the first  - Robust 3-4 cm at the second  - 7-8 cm at the third   - 14-16 cm at the fourth   - Along frontal hairline, robust 1-2 cm fibers.  - No diffuse erythema   - No perifollicular erythema  - No perifollicular scale   - Mild scaling of the scalp   - Generalized volume loss throughout scalp  - Negative hair pull test   - Normal eyelash density  - Normal eyebrow density  - No nail pitting or dystrophy   - No scalp folliculitis/pustules   - In comparison to prior photographs, stable to improved.  - No other lesions of concern on areas examined.     Medications:  Current Outpatient Medications   Medication     clobetasol propionate (CLOBEX) 0.05 % external shampoo     desogestrel-ethinyl estradiol (JOSS, VELIVET) 0.1/0.125/0.15 -0.025 MG per tablet     Fluocinolone Acetonide Scalp 0.01 % OIL oil     ibuprofen (ADVIL/MOTRIN) 200 MG tablet     ketoconazole (NIZORAL) 2 % external shampoo     spironolactone (ALDACTONE) 50 MG tablet     triamcinolone (KENALOG) 0.1 % ointment      clobetasol (TEMOVATE) 0.05 % external solution     No current facility-administered medications for this visit.      Past Medical History:   Patient Active Problem List   Diagnosis     Alopecia     Dermatitis, seborrheic     Loss of hair     Low iron stores     Past Medical History:   Diagnosis Date     Eczema      Nickel allergy     identified by allergy to belt buckle     Uncomplicated asthma        CC Neena Massey MD  No address on file on close of this encounter.    HairMetrix Summary (11/07/22)    Frontal anterior        Mid scalp        Vertex      Occipital        Right temporal        Left temporal        Summary                Again, thank you for allowing me to participate in the care of your patient.      Sincerely,    Velvet Huston MD

## 2022-11-07 NOTE — PROGRESS NOTES
[facility] Dermatology Note  Encounter Date: Nov 7, 2022  Office Visit     Dermatology Problem List:  1. Non-scarring alopecia: ddx psoriatic alopecia vs AA, now favoring female pattern hair loss, stable  - Scalp biopsy 1/2017 most consistent with psoriatic alopecia vs. alopecia areata, initially did not have any evidence of psoriasis elsewhere on body but rash on bilateral ears noted 5/7/18    - ILK 10 mg/cc 4/2017 and 5/2017 without much improvement.  - Patient stopped clobetasol 0.05% shampoo due to high cost  - Current Tx: alternate ketoconazole 2% shampoo and DHS zinc shampoo daily, laser comb TIW, Rogaine 5% foam daily, daily multivitamin, spironolactone 150 mg daily    2. Seborrheic Dermatitis  - Current Tx: derma-smoothe/fs oil 1-2x weekly for the winter months    3. History of dermatitis, behind ears  - s/p 0.1% Triamcinolone ointment (when needed)    4. History of low iron stores  - WNL 9/3/2020 and 11/2021    5. 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 Non-scarring alopecia: presently favoring female pattern hair loss; previously ddx with psoriatic alopecia vs AA. Overall stable to improved compared to last visit. Discussed addition of low dose oral minoxidil and potential side effects including unwanted facial hair growth, reduced blood pressure, swelling in feet.    Continue spironolactone 150 mg daily. BP today: 139/87    Continue alternating ketoconazole 2% shampoo and Head and Shoulders     Continue with Rogaine 5% foam daily    Continue with laser comb three times weekly  - Restart Derma-Smoothe/FS (fluocinolone acetonide) oil  - Start low dose oral minoxidil pending labs  - Labs: CMP, ferritin, Iron, TSH - wnl but ferritin is again in the low normal range, recommend daily iron supplementation with either ferrous gluconate or sulfate, 324 mg; also one liver enzyme - AST -  is elevated, recommend  repeating hepatic studies  - Hairmetrix today    Future: consider PRP    Procedures Performed:   Hair Metrix:   - Frontal Scalp: 144-135   - Mid-Scalp: 168-165   - Vertex Scalp: 202-158   - Occipital Scalp: 159-169   - Right Temporal Scalp:    - Left Temporal Scalp:    Perifollicular and loose scale present     Follow-up: 4-6 month(s) in-person, or earlier for new or changing lesions    Staff and Scribe:     Scribe Disclosure:  I, Mirza Mai, am serving as a scribe to document services personally performed by Velvet Huston MD based on data collection and the provider's statements to me.     Provider Disclosure:   The documentation recorded by the scribe accurately reflects the services I personally performed and the decisions made by me.    Velvet Huston MD  Professor and Chair  Department of Dermatology  [facility]: Phone: [phone], Fax:[phone]  [facility]: Phone: [phone], Fax: [phone]      ____________________________________________    CC: Hair Loss (Follow up on hair loss; )    HPI:  Ms. Daily Vyas is a 27 year old female who presents as a return patient for follow-up of hair loss, diagnosed as non-scarring alopecia.   - Last seen in-clinic by me on 2022  - Shedding or thinning, or both: stable shedding  - Current tx: alternate ketoconazole 2% shampoo and DHS zinc shampoo daily, laser comb TIW, Rogaine 5% foam daily, daily multivitamin, spironolactone 150 mg daily  - If using Rogaine, 1 cannister lasts how lon month  - Scalp or hair care habits/products: washes hair daily  No Any new medications, supplements, or products? (please list below)     No Scalp pain   No Scalp burning   No Scalp itching    No Eyebrow changes    No Eyelash changes   No Beard changes    No Other body hair changes    No Nail changes    No Additional symptoms?  (please list below)     - Overall course: Patient reports hair loss has been stable.  - COVID status: yes, 9/2022    Patient is otherwise feeling well, in usual state of health, and has no additional skin concerns today.     Labs:  None reviewed.    Physical Exam:  Vitals: /87 (BP Location: Left arm)   Pulse 81   GEN: Well developed, well-nourished, in no acute distress, in a pleasant mood.    SKIN: Focused examination of face and scalp was performed.  - Hammad part width of 1.2 front, 1.1 mid, 1.2 vertex, thin to mid occipital scalp.  - The layers of hair regrowth layers were noted to be  - Robust 1-2 cm for the first  - Robust 3-4 cm at the second  - 7-8 cm at the third   - 14-16 cm at the fourth   - Along frontal hairline, robust 1-2 cm fibers.  - No diffuse erythema   - No perifollicular erythema  - No perifollicular scale   - Mild scaling of the scalp   - Generalized volume loss throughout scalp  - Negative hair pull test   - Normal eyelash density  - Normal eyebrow density  - No nail pitting or dystrophy   - No scalp folliculitis/pustules   - In comparison to prior photographs, stable to improved.  - No other lesions of concern on areas examined.     Medications:  Current Outpatient Medications   Medication     clobetasol propionate (CLOBEX) 0.05 % external shampoo     desogestrel-ethinyl estradiol (JOSS, VELIVET) 0.1/0.125/0.15 -0.025 MG per tablet     Fluocinolone Acetonide Scalp 0.01 % OIL oil     ibuprofen (ADVIL/MOTRIN) 200 MG tablet     ketoconazole (NIZORAL) 2 % external shampoo     spironolactone (ALDACTONE) 50 MG tablet     triamcinolone (KENALOG) 0.1 % ointment     clobetasol (TEMOVATE) 0.05 % external solution     No current facility-administered medications for this visit.      Past Medical History:   Patient Active Problem List   Diagnosis     Alopecia     Dermatitis, seborrheic     Loss of hair     Low iron stores     Past Medical History:   Diagnosis Date     Eczema      Nickel allergy      identified by allergy to belt buckle     Uncomplicated asthma        CC Neena Massey MD  No address on file on close of this encounter.

## 2022-11-07 NOTE — LETTER
Date:November 15, 2022      Patient was self referred, no letter generated. Do not send.        [facility] Health Information

## 2022-11-07 NOTE — NURSING NOTE
Dermatology Rooming Note    Daily Vyas's goals for this visit include:   Chief Complaint   Patient presents with     Hair Loss     Follow up on hair loss;      Christiano Hoffmann, EMT-B

## 2022-11-07 NOTE — PROGRESS NOTES
HairMetrix Summary (11/07/22)    Frontal anterior        Mid scalp        Vertex      Occipital        Right temporal        Left temporal        Summary

## 2022-12-12 ENCOUNTER — OFFICE VISIT (OUTPATIENT)
Dept: DERMATOLOGY | Facility: CLINIC | Age: 27
End: 2022-12-12
Payer: COMMERCIAL

## 2022-12-12 DIAGNOSIS — L90.5 SCAR: Primary | ICD-10-CM

## 2022-12-12 DIAGNOSIS — Z48.89 ENCOUNTER FOR POSTOPERATIVE WOUND CHECK: ICD-10-CM

## 2022-12-12 PROCEDURE — 99024 POSTOP FOLLOW-UP VISIT: CPT | Mod: GC | Performed by: DERMATOLOGY

## 2022-12-12 NOTE — LETTER
Date:January 4, 2023      Provider requested that no letter be sent. Do not send.       [facility]

## 2022-12-12 NOTE — PROGRESS NOTES
Laser- VBeam(Pulsed Dye Laser) Procedure Note: Medical    Dermatology Problem List:  1. Non-scarring alopecia: ddx psoriatic alopecia vs AA, now favoring female pattern hair loss, stable  - Scalp biopsy 2017 most consistent with psoriatic alopecia vs. alopecia areata, initially did not have any evidence of psoriasis elsewhere on body but rash on bilateral ears noted 18    - ILK 10 mg/cc 2017 and 2017 without much improvement.  - Patient stopped clobetasol 0.05% shampoo due to high cost  - Current Tx: alternate ketoconazole 2% shampoo and DHS zinc shampoo daily, laser comb TIW, Rogaine 5% foam daily, daily multivitamin, spironolactone 150 mg daily  2. Seborrheic Dermatitis  - Current Tx: derma-smoothe/fs oil 1-2x weekly for the winter months  3. History of dermatitis, behind ears  - s/p 0.1% Triamcinolone ointment (when needed)  4. History of low iron stores  - WNL 9/3/2020 and 2021  5. 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    Procedure Date: Dec 12, 2022    Attending Staff Surgeon: Kevin Ocampo MD     Fellow Surgeon: Kong Carrington MD     Assistant: Charu Lynne CMA     Operating Room Data:     Surgery/Procedure Date:    SAME      Pre-operative Diagnosis:   Scar  Location: left cheek  Size(cm2): 7 x 7 cm  Number of lesions: 1    Operation/Procedure    Vbeam pulsed dye laser treatment#: 4     Post-operative Diagnosis:  SAME     Laser Settings:  Cheek:  Energy: 6.5 J/cm2  Spot size:10 mm  Pulse width: 6 mS (0.45 thru 40 mS)  Dynamic cooling spray settin mS  Dynamic cooling device delay:  20 mS    Fotofinder photos: No    Anesthesia:  None    Description of Operation/Procedure:   The nature and purpose of the procedure, associated risks, possible consequences, complications and alternative methods of treatment were explained in detail, this includes but is not limited to hyperpigmentation, hypopigmentation, scarring, bruising, hair loss pain/discomfort, eye injury, hair loss,  and blister. We  reviewed that the outcome could be any of the following: no improvement, slight improvement or change in skin color & texture, the skin might be permanently lighter or darker, and though uncommon, superficial scarring may occur.  Multiple treatments may be recommended.     A photo and operative consent were obtained. Time-out was performed.The patient was positioned to optimally expose the area treated. Protective eyewear was worn by the patient and goggles on all personnel in the treatment room. The patient confirmed the site to be treated. The laser energy output was verified by meter reading.      The clinically evident lesion(s) was/were treated with Kathrin Vbeam pulsed dye laser (595 nm) beam as above. A total of 20 pulses were used. The patient tolerated the procedure well and no complications were noted. Post operative instructions were provided. The total laser operation and preparation time was 10 minutes.  The patient was counseled to call immediately for any issues and read the after visit summary for emergency contact information.    The patient will follow-up in 6-8 weeks for repeat treatment. Will discuss repeat PDL vs CO2.     The patient will NOT pay the cosmetic fee today.      Dr. Ocampo staffed the patient was present for the entire procedure.    Kong Carrington MD  Dermatology, PGY-5  Mohs surgery fellow    Staff Involved:  Scribe/Staff    Scribe Disclosure:  I, BEATRIZ COVINGTON, am serving as a scribe to document services personally performed by Kevin Ocampo MD based on data collection and the provider's statements to me.       Attending attestation:  I was present for key elements of the procedure and immediately available for all other portions of the procedure.  I have reviewed the note and edited it as necessary.    Kevin Ocampo M.D.  Professor  Director of Dermatologic Surgery  Department of Dermatology  [facility]    Dermatology Surgery Clinic  [facility]  [facility] [address] [address]       Statement Selected

## 2022-12-12 NOTE — PATIENT INSTRUCTIONS
Pulsed Dye Laser (PDL)    I will experience redness, swelling, pain, and heat sensation. I may experience bruising, itching, or acne. Risks are blistering, oozing, permanent scarring, hair loss, temporary or permanent skin lightening or darkening, infection, and eye injury. I understand my outcome could be no improvement, slight improvement. Multiple treatments may be required.    After treatment, Do Not:  Rub, scratch, or put weight on the site for 2 weeks  Wear tight fitting clothing or jewelry over the site  Saab. Keep the site out of sunlight. Use sunscreen of 30 SPF or greater when in the sun. Use sunscreen 30 minutes before going out and reapply if sweating. Tanning decreases the success of the treatment    How do I care for the treated site?  Use ice packs for 10-20 minutes after the procedure for swelling   If the site is on your face, use ice again 1 hour after treatment for ten minutes and repeat again before bed. Do not burn the skin with the ice.   If a scab or crust forms, gently cleanse the site with water. Then put on Vaseline  ointment 3 times a day and contact the clinic   If a blister forms, contact the clinic by phone  If you have concerns about swelling, call the clinic  Avoid sun exposure and do not get tan as this can darken the treated area, use sunscreen  Do not use makeup on any open wound  Do not come to your next treatment with a tan    What should I expect?  Mild swelling  Blue-purple color that may take 2 to 3 weeks to go away  Redness may also last a week or longer  Results may take up to 3 or 4 months after treatment  More procedures may be needed    Who should I call with questions?  [facility]: [phone]  [facility]: [phone]  For urgent needs outside of business hours call the [facility] at [phone] and ask for the dermatology resident on call  [employer] messaging response may be delayed, please call for  urgent issues

## 2022-12-12 NOTE — NURSING NOTE
Chief Complaint   Patient presents with     Laser Treatment     PDL on face     Charu EMMANUEL CMA      Dermatology Laser Intake Checklist:  History of psoriasis: No  History of recent tan, indoor or outdoor tanning/vacation or other sun exposure: No  History of vitiligo: No  Family history of vitiligo: No  Recent other cosmetic procedure(microderm abrasion/peel/hair removal/facial etc): No  History of HSV: No  Did the patient start valtrex: N/A  For genital laser hair removal patient only: Is there a history of genital warts or condyloma: No  Tattoo in the area to be treated: No  Is patient using hydroquinone: No  Retinoids and other acne medications stopped for 2 weeks: No  Has the patient had accutane in the last 6-12 months: No  Pregnant or breastfeeding: No  History of skin cancer in area planned for treatment: Yes  History of treatment with gold: No  Changes in medical history: No  Photos obtained: Yes  Does the patient smoke: No  Is the patient on ibuprofen/aspirin/plavix/coumadin/other blood thinner: No  If patient is taking narcotic or diazepam(valium)-does patient have : No  There were no vitals taken for this visit.

## 2022-12-12 NOTE — LETTER
2022       RE: Daily Vyas  [address] [address]     Dear Colleague,    Thank you for referring your patient, Daily Vyas, to the [facility] DERMATOLOGIC SURGERY CLINIC [place] at [facility]. Please see a copy of my visit note below.    Laser- VBeam(Pulsed Dye Laser) Procedure Note: Medical    Dermatology Problem List:  1. Non-scarring alopecia: ddx psoriatic alopecia vs AA, now favoring female pattern hair loss, stable  - Scalp biopsy 2017 most consistent with psoriatic alopecia vs. alopecia areata, initially did not have any evidence of psoriasis elsewhere on body but rash on bilateral ears noted 18    - ILK 10 mg/cc 2017 and 2017 without much improvement.  - Patient stopped clobetasol 0.05% shampoo due to high cost  - Current Tx: alternate ketoconazole 2% shampoo and DHS zinc shampoo daily, laser comb TIW, Rogaine 5% foam daily, daily multivitamin, spironolactone 150 mg daily  2. Seborrheic Dermatitis  - Current Tx: derma-smoothe/fs oil 1-2x weekly for the winter months  3. History of dermatitis, behind ears  - s/p 0.1% Triamcinolone ointment (when needed)  4. History of low iron stores  - WNL 9/3/2020 and 2021  5. 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    Procedure Date: Dec 12, 2022    Attending Staff Surgeon: Kevin Ocampo MD     Fellow Surgeon: Kong Carrington MD     Assistant: Charu Lynne CMA     Operating Room Data:     Surgery/Procedure Date:    SAME      Pre-operative Diagnosis:   Scar  Location: left cheek  Size(cm2): 7 x 7 cm  Number of lesions: 1    Operation/Procedure    Vbeam pulsed dye laser treatment#: 4     Post-operative Diagnosis:  SAME     Laser Settings:  Cheek:  Energy: 6.5 J/cm2  Spot size:10 mm  Pulse width: 6 mS (0.45 thru 40 mS)  Dynamic cooling spray settin mS  Dynamic cooling device delay:  20 mS    Fotofinder photos: No    Anesthesia:  None    Description of  Operation/Procedure:   The nature and purpose of the procedure, associated risks, possible consequences, complications and alternative methods of treatment were explained in detail, this includes but is not limited to hyperpigmentation, hypopigmentation, scarring, bruising, hair loss pain/discomfort, eye injury, hair loss,  and blister. We reviewed that the outcome could be any of the following: no improvement, slight improvement or change in skin color & texture, the skin might be permanently lighter or darker, and though uncommon, superficial scarring may occur.  Multiple treatments may be recommended.     A photo and operative consent were obtained. Time-out was performed.The patient was positioned to optimally expose the area treated. Protective eyewear was worn by the patient and goggles on all personnel in the treatment room. The patient confirmed the site to be treated. The laser energy output was verified by meter reading.      The clinically evident lesion(s) was/were treated with Kathrin Vbeam pulsed dye laser (595 nm) beam as above. A total of 20 pulses were used. The patient tolerated the procedure well and no complications were noted. Post operative instructions were provided. The total laser operation and preparation time was 10 minutes.  The patient was counseled to call immediately for any issues and read the after visit summary for emergency contact information.    The patient will follow-up in 6-8 weeks for repeat treatment. Will discuss repeat PDL vs CO2.     The patient will NOT pay the cosmetic fee today.      Dr. Ocampo staffed the patient was present for the entire procedure.    Kong Carrington MD  Dermatology, PGY-5  Mohs surgery fellow    Staff Involved:  Scribe/Staff    Scribe Disclosure:  IBEATRIZ, am serving as a scribe to document services personally performed by Kevin Ocampo MD based on data collection and the provider's statements to me.       Attending attestation:  I was  present for key elements of the procedure and immediately available for all other portions of the procedure.  I have reviewed the note and edited it as necessary.    Kevin Ocampo M.D.  Professor  Director of Dermatologic Surgery  Department of Dermatology  [facility]    Dermatology Surgery Clinic  [facility] [place] [phone]          Again, thank you for allowing me to participate in the care of your patient.      Sincerely,    Kevin Ocampo MD

## 2022-12-18 ASSESSMENT — ENCOUNTER SYMPTOMS
ABDOMINAL PAIN: 0
ARTHRALGIAS: 0
COUGH: 0
PALPITATIONS: 0
DIZZINESS: 0
PARESTHESIAS: 0
BREAST MASS: 0
CHILLS: 0
DYSURIA: 0
CONSTIPATION: 0
NERVOUS/ANXIOUS: 0
NAUSEA: 0
SHORTNESS OF BREATH: 0
HEADACHES: 0
DIARRHEA: 0
FEVER: 0
HEARTBURN: 0
JOINT SWELLING: 0
WEAKNESS: 0
MYALGIAS: 0
EYE PAIN: 0
SORE THROAT: 0
HEMATOCHEZIA: 0
HEMATURIA: 0
FREQUENCY: 0

## 2022-12-19 ENCOUNTER — OFFICE VISIT (OUTPATIENT)
Dept: FAMILY MEDICINE | Facility: CLINIC | Age: 27
End: 2022-12-19
Payer: COMMERCIAL

## 2022-12-19 VITALS
TEMPERATURE: 98.6 F | OXYGEN SATURATION: 100 % | HEIGHT: 67 IN | HEART RATE: 101 BPM | SYSTOLIC BLOOD PRESSURE: 124 MMHG | BODY MASS INDEX: 36.73 KG/M2 | WEIGHT: 234 LBS | DIASTOLIC BLOOD PRESSURE: 78 MMHG

## 2022-12-19 DIAGNOSIS — E66.812 CLASS 2 OBESITY WITHOUT SERIOUS COMORBIDITY WITH BODY MASS INDEX (BMI) OF 36.0 TO 36.9 IN ADULT, UNSPECIFIED OBESITY TYPE: ICD-10-CM

## 2022-12-19 DIAGNOSIS — Z23 ENCOUNTER FOR IMMUNIZATION: ICD-10-CM

## 2022-12-19 DIAGNOSIS — R74.01 ELEVATED AST (SGOT): ICD-10-CM

## 2022-12-19 DIAGNOSIS — Z30.41 ENCOUNTER FOR SURVEILLANCE OF CONTRACEPTIVE PILLS: ICD-10-CM

## 2022-12-19 DIAGNOSIS — Z23 HIGH PRIORITY FOR 2019-NCOV VACCINE: ICD-10-CM

## 2022-12-19 DIAGNOSIS — Z00.00 ROUTINE GENERAL MEDICAL EXAMINATION AT A HEALTH CARE FACILITY: Primary | ICD-10-CM

## 2022-12-19 PROCEDURE — 91312 COVID-19 VACCINE BIVALENT BOOSTER 12+ (PFIZER): CPT | Performed by: STUDENT IN AN ORGANIZED HEALTH CARE EDUCATION/TRAINING PROGRAM

## 2022-12-19 PROCEDURE — 99385 PREV VISIT NEW AGE 18-39: CPT | Mod: 25 | Performed by: STUDENT IN AN ORGANIZED HEALTH CARE EDUCATION/TRAINING PROGRAM

## 2022-12-19 PROCEDURE — 90471 IMMUNIZATION ADMIN: CPT | Performed by: STUDENT IN AN ORGANIZED HEALTH CARE EDUCATION/TRAINING PROGRAM

## 2022-12-19 PROCEDURE — 99213 OFFICE O/P EST LOW 20 MIN: CPT | Mod: 25 | Performed by: STUDENT IN AN ORGANIZED HEALTH CARE EDUCATION/TRAINING PROGRAM

## 2022-12-19 PROCEDURE — 90686 IIV4 VACC NO PRSV 0.5 ML IM: CPT | Performed by: STUDENT IN AN ORGANIZED HEALTH CARE EDUCATION/TRAINING PROGRAM

## 2022-12-19 PROCEDURE — 0124A COVID-19 VACCINE BIVALENT BOOSTER 12+ (PFIZER): CPT | Performed by: STUDENT IN AN ORGANIZED HEALTH CARE EDUCATION/TRAINING PROGRAM

## 2022-12-19 ASSESSMENT — ENCOUNTER SYMPTOMS
COUGH: 0
HEADACHES: 0
EYE PAIN: 0
SHORTNESS OF BREATH: 0
SORE THROAT: 0
DIARRHEA: 0
DIZZINESS: 0
ARTHRALGIAS: 0
CHILLS: 0
PARESTHESIAS: 0
HEARTBURN: 0
NERVOUS/ANXIOUS: 0
PALPITATIONS: 0
FEVER: 0
DYSURIA: 0
HEMATOCHEZIA: 0
JOINT SWELLING: 0
MYALGIAS: 0
NAUSEA: 0
FREQUENCY: 0
CONSTIPATION: 0
WEAKNESS: 0
BREAST MASS: 0
ABDOMINAL PAIN: 0
HEMATURIA: 0

## 2022-12-19 NOTE — PROGRESS NOTES
SUBJECTIVE:   CC: Daily is an 27 year old who presents for preventive health visit.     Patient has been advised of split billing requirements and indicates understanding: Yes  Healthy Habits:     Getting at least 3 servings of Calcium per day:  Yes    Bi-annual eye exam:  NO    Dental care twice a year:  Yes    Sleep apnea or symptoms of sleep apnea:  None    Diet:  Vegetarian/vegan    Frequency of exercise:  2-3 days/week    Duration of exercise:  15-30 minutes    Taking medications regularly:  Yes    PHQ-2 Total Score: 0    Additional concerns today:  No    Fasting    Elevated AST on her last check by dermatology   Thinks may be from her strength training class that she started just prior to that lab draw. No frequent alcohol or tylenol use      Currently exercise 1-2 times per week for strength classes then some walks during the week (2-3 days/week)   Diet worse over the holidays. Larger meal at night.       Birthcontrol- has been on the same OCP for multiple years.   Menses regular, last 4-5 days, moderate bleeding.   No hx of HTN, migraines, no smoking. Family history of factor 9 - mom. She has been tested and was negative.   One male partner. No concern for STI    Social alcohol 1-2 drinks/week.   No drug or tobacco use.     Last pap was 11/2021    Follows with dermatology for seborrheic dermatitis, alopecia.   Uses face moistures SPF  Trouble with lotion getting into eyes.              Today's PHQ-2 Score:   PHQ-2 ( 1999 [employer]) 12/18/2022   Q1: Little interest or pleasure in doing things 0   Q2: Feeling down, depressed or hopeless 0   PHQ-2 Score 0   PHQ-2 Total Score (12-17 Years)- Positive if 3 or more points; Administer PHQ-A if positive -   Q1: Little interest or pleasure in doing things Not at all   Q2: Feeling down, depressed or hopeless Not at all   PHQ-2 Score 0       Have you ever done Advance Care Planning? (For example, a Health Directive, POLST, or a discussion with a medical provider or  your loved ones about your wishes): No, advance care planning information given to patient to review.  Patient declined advance care planning discussion at this time.    Social History     Tobacco Use     Smoking status: Never     Smokeless tobacco: Never   Substance Use Topics     Alcohol use: Yes     Comment: Socially     If you drink alcohol do you typically have >3 drinks per day or >7 drinks per week? No    Alcohol Use 12/19/2022   Prescreen: >3 drinks/day or >7 drinks/week? -   Prescreen: >3 drinks/day or >7 drinks/week? No       Reviewed orders with patient.  Reviewed health maintenance and updated orders accordingly - Yes  Lab work is in process  Labs reviewed in EPIC  BP Readings from Last 3 Encounters:   12/19/22 124/78   11/07/22 120/74   05/09/22 120/78    Wt Readings from Last 3 Encounters:   12/19/22 106.1 kg (234 lb)   11/20/19 91.6 kg (202 lb)                  Patient Active Problem List   Diagnosis     Alopecia     Dermatitis, seborrheic     Loss of hair     Low iron stores     Past Surgical History:   Procedure Laterality Date     BIOPSY       HEAD & NECK SURGERY         Social History     Tobacco Use     Smoking status: Never     Smokeless tobacco: Never   Substance Use Topics     Alcohol use: Yes     Comment: Socially     Family History   Problem Relation Age of Onset     Diabetes Maternal Grandmother      Melanoma No family hx of      Skin Cancer No family hx of          Current Outpatient Medications   Medication Sig Dispense Refill     clobetasol propionate (CLOBEX) 0.05 % external shampoo APPLY TO DRY SCALP, LEAVE ON 10 MINS THEN LATHER & RINSE*DO EVERY OTHER DAY ROTATE W/KETOCONAZOLE 118 mL 0     desogestrel-ethinyl estradiol (JOSS/VELIVET) 0.1/0.125/0.15 -0.025 MG tablet Take 1 tablet by mouth daily 84 tablet 3     Fluocinolone Acetonide Scalp 0.01 % OIL oil Apply 1-2 caps full to scalp for 4 hours, preferably overnight, then shampoo. Apply 1-2 times weekly. 118 mL 1     ibuprofen  (ADVIL/MOTRIN) 200 MG tablet Take 200 mg by mouth as needed       ketoconazole (NIZORAL) 2 % external shampoo APPLY & LATHER ONTO DAMP SCALP & LEAVE ON 3-5MIN FOR 3 TIMES WEEKLY 120 mL 4     minoxidil (LONITEN) 2.5 MG tablet Take 1/2 tablet once daily (1.25 mg) 60 tablet 3     spironolactone (ALDACTONE) 50 MG tablet Take 3 tablets (150 mg) by mouth daily 270 tablet 3     triamcinolone (KENALOG) 0.1 % ointment Apply topically 2 times daily To ears for 2-3 weeks, then a few times per week for maintenance. 80 g 3     clobetasol (TEMOVATE) 0.05 % external solution Apply to affected areas of scalp (above ears) once daily. (Patient not taking: Reported on 7/12/2022) 50 mL 3     Allergies   Allergen Reactions     Fexofenadine Other (See Comments)     Bloody nose  Other reaction(s): Bloody Nose  Other reaction(s): Bloody Nose       Breast Cancer Screening:    Breast CA Risk Assessment (FHS-7) 12/18/2022   Do you have a family history of breast, colon, or ovarian cancer? No / Unknown       click delete button to remove this line now  Patient under 40 years of age: Routine Mammogram Screening not recommended.   Pertinent mammograms are reviewed under the imaging tab.    History of abnormal Pap smear: NO - age 21-29 PAP every 3 years recommended  Last 3 Pap Results: No results found for: PAP     Reviewed and updated as needed this visit by clinical staff   Tobacco   Meds     [facility] Hx          Reviewed and updated as needed this visit by Provider         Graham Lucas         Past Medical History:   Diagnosis Date     Eczema      Nickel allergy     identified by allergy to belt buckle     Uncomplicated asthma       Past Surgical History:   Procedure Laterality Date     BIOPSY       HEAD & NECK SURGERY         Review of Systems   Constitutional: Negative for chills and fever.   HENT: Negative for congestion, ear pain, hearing loss and sore throat.    Eyes: Negative for pain and visual disturbance.   Respiratory: Negative for cough  "and shortness of breath.    Cardiovascular: Negative for chest pain, palpitations and peripheral edema.   Gastrointestinal: Negative for abdominal pain, constipation, diarrhea, heartburn, hematochezia and nausea.   Breasts:  Negative for tenderness, breast mass and discharge.   Genitourinary: Negative for dysuria, frequency, genital sores, hematuria, pelvic pain, urgency, vaginal bleeding and vaginal discharge.   Musculoskeletal: Negative for arthralgias, joint swelling and myalgias.   Skin: Negative for rash.   Neurological: Negative for dizziness, weakness, headaches and paresthesias.   Psychiatric/Behavioral: Negative for mood changes. The patient is not nervous/anxious.           OBJECTIVE:   /78 (BP Location: Right arm, Patient Position: Sitting, Cuff Size: Adult Large)   Pulse 101   Temp 98.6  F (37  C) (Oral)   Ht 1.71 m (5' 7.32\")   Wt 106.1 kg (234 lb)   SpO2 100%   BMI 36.30 kg/m    Physical Exam  GENERAL: healthy, alert and no distress  EYES: Eyes grossly normal to inspection, PERRL and conjunctivae and sclerae normal  HENT: ear canals and TM's normal, nose and mouth without ulcers or lesions  NECK: no adenopathy, no asymmetry, masses, or scars and thyroid normal to palpation  RESP: lungs clear to auscultation - no rales, rhonchi or wheezes  CV: regular rate and rhythm, normal S1 S2, no S3 or S4, no murmur, click or rub, no peripheral edema and peripheral pulses strong  ABDOMEN: soft, nontender, no hepatosplenomegaly, no masses and bowel sounds normal  MS: no gross musculoskeletal defects noted, no edema  SKIN: no suspicious lesions or rashes  NEURO: Normal strength and tone, mentation intact and speech normal  PSYCH: mentation appears normal, affect normal/bright    Diagnostic Test Results:  Labs reviewed in Epic    ASSESSMENT/PLAN:   (Z00.00) Routine general medical examination at a health care facility  (primary encounter diagnosis)  Comment: pap UTD 11/2021    (Z30.41) Encounter for " surveillance of contraceptive pills  Comment: no new contraindications to use of OCP. Will refill.  Plan: desogestrel-ethinyl estradiol (JOSS/VELIVET)         0.1/0.125/0.15 -0.025 MG tablet            (R74.01) Elevated AST (SGOT)  Comment: recommend repeating labs today to evaluate. No regular use of alcohol or hepatotoxic medications. Possible role of NAFLD. Check labs. If still elevated but mild then check ferritin and work on weight loss and recheck in 6 months - if still elevated then obtain US liver.consider also from OCP?  Plan: Hepatic panel (Albumin, ALT, AST, Bili, Alk         Phos, TP)           (E66.9,  Z68.36) Class 2 obesity without serious comorbidity with body mass index (BMI) of 36.0 to 36.9 in adult, unspecified obesity type  Comment: counseled on exercise and weight loss    (Z23) High priority for 2019-nCoV vaccine  Plan: COVID-19,PF,PFIZER BOOSTER BIVALENT 12+Yrs          (Z23) Encounter for immunization  Plan: INFLUENZA VACCINE IM > 6 MONTHS VALENT IIV4         (AFLURIA/FLUZONE)             Patient has been advised of split billing requirements and indicates understanding: Yes      COUNSELING:  Reviewed preventive health counseling, as reflected in patient instructions       Regular exercise       Healthy diet/nutrition       Alcohol Use       Contraception        She reports that she has never smoked. She has never used smokeless tobacco.      Natividad Lira DO  [facility]

## 2023-01-23 ENCOUNTER — OFFICE VISIT (OUTPATIENT)
Dept: DERMATOLOGY | Facility: CLINIC | Age: 28
End: 2023-01-23
Payer: COMMERCIAL

## 2023-01-23 DIAGNOSIS — L90.5 SCAR: Primary | ICD-10-CM

## 2023-01-23 DIAGNOSIS — Z48.89 ENCOUNTER FOR POSTOPERATIVE WOUND CHECK: ICD-10-CM

## 2023-01-23 PROCEDURE — 99024 POSTOP FOLLOW-UP VISIT: CPT | Mod: GC | Performed by: DERMATOLOGY

## 2023-01-23 NOTE — LETTER
2023       RE: Daily Vyas  [address] [address]     Dear Colleague,    Thank you for referring your patient, Daily Vyas, to the [facility] DERMATOLOGIC SURGERY CLINIC [place] at [facility]. Please see a copy of my visit note below.    Laser- VBeam(Pulsed Dye Laser) Procedure Note: Medical    Dermatology Problem List:  1. Non-scarring alopecia: ddx psoriatic alopecia vs AA, now favoring female pattern hair loss, stable  - Scalp biopsy 2017 most consistent with psoriatic alopecia vs. alopecia areata, initially did not have any evidence of psoriasis elsewhere on body but rash on bilateral ears noted 18    - ILK 10 mg/cc 2017 and 2017 without much improvement.  - Patient stopped clobetasol 0.05% shampoo due to high cost  - Current Tx: alternate ketoconazole 2% shampoo and DHS zinc shampoo daily, laser comb TIW, Rogaine 5% foam daily, daily multivitamin, spironolactone 150 mg daily  2. Seborrheic Dermatitis  - Current Tx: derma-smoothe/fs oil 1-2x weekly for the winter months  3. History of dermatitis, behind ears  - s/p 0.1% Triamcinolone ointment (when needed)  4. History of low iron stores  - WNL 9/3/2020 and 2021  5. 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    Procedure Date: 2023    Attending Staff Surgeon: Kevin Ocampo MD     Fellow Surgeon: Kong Carrington MD     Assistant: Charu Lynne CMA     Operating Room Data:     Surgery/Procedure Date:    SAME      Pre-operative Diagnosis:   Scar  Location: left cheek  Size(cm2): 7 x 7 cm  Number of lesions: 1    Operation/Procedure    Vbeam pulsed dye laser treatment#: 5    Post-operative Diagnosis:  SAME     Laser Settings:  Cheek:  Energy: 6.5 J/cm2  Spot size:10 mm  Pulse width: 6 mS (0.45 thru 40 mS)  Dynamic cooling spray settin mS  Dynamic cooling device delay:  20 mS    Fotofinder photos: No    Anesthesia:  None    Description of  Operation/Procedure:   The nature and purpose of the procedure, associated risks, possible consequences, complications and alternative methods of treatment were explained in detail, this includes but is not limited to hyperpigmentation, hypopigmentation, scarring, bruising, hair loss pain/discomfort, eye injury, hair loss,  and blister. We reviewed that the outcome could be any of the following: no improvement, slight improvement or change in skin color & texture, the skin might be permanently lighter or darker, and though uncommon, superficial scarring may occur.  Multiple treatments may be recommended.     A photo and operative consent were obtained. Time-out was performed.The patient was positioned to optimally expose the area treated. Protective eyewear was worn by the patient and goggles on all personnel in the treatment room. The patient confirmed the site to be treated. The laser energy output was verified by meter reading.      The clinically evident lesion(s) was/were treated with Kathrin Vbeam pulsed dye laser (595 nm) beam as above. A total of 20 pulses were used. The patient tolerated the procedure well and no complications were noted. Post operative instructions were provided. The total laser operation and preparation time was 10 minutes.  The patient was counseled to call immediately for any issues and read the after visit summary for emergency contact information.    The patient will follow-up in 6-8 weeks for repeat treatment. Will discuss repeat PDL vs CO2.     The patient will NOT pay the cosmetic fee today.      Dr. Ocampo staffed the patient was present for the entire procedure.    Kong Carrington MD  Dermatology, PGY-5  Mohs surgery fellow      Attending attestation:  I was present for key elements of the procedure and immediately available for all other portions of the procedure.  I have reviewed the note and edited it as necessary.    Kevin Ocampo M.D.  Professor  Director of Dermatologic  Surgery  Department of Dermatology  [facility]    Dermatology Surgery Clinic  [facility]  [address] [place], [place] [id number]        Again, thank you for allowing me to participate in the care of your patient.      Sincerely,    Kevin Ocampo MD

## 2023-01-23 NOTE — NURSING NOTE
Chief Complaint   Patient presents with     Derm Problem     PDL cheek     Sandrine KUHN, EMT  Dermatology/Dermatology Surgery  [phone]       Dermatology Laser Intake Checklist:  History of psoriasis: No  History of recent tan, indoor or outdoor tanning/vacation or other sun exposure: No  History of vitiligo: No  Family history of vitiligo: No  Recent other cosmetic procedure(microderm abrasion/peel/hair removal/facial etc): No  History of HSV: No  Did the patient start valtrex: N/A  For genital laser hair removal patient only: Is there a history of genital warts or condyloma: No  Tattoo in the area to be treated: No  Is patient using hydroquinone: No  Retinoids and other acne medications stopped for 2 weeks: No  Has the patient had accutane in the last 6-12 months: No  Pregnant or breastfeeding: No  History of skin cancer in area planned for treatment: Yes  History of treatment with gold: No  Changes in medical history: No  Photos obtained: Yes  Does the patient smoke: No  Is the patient on ibuprofen/aspirin/plavix/coumadin/other blood thinner: No  If patient is taking narcotic or diazepam(valium)-does patient have : No  There were no vitals taken for this visit.

## 2023-01-24 NOTE — PROGRESS NOTES
Laser- VBeam(Pulsed Dye Laser) Procedure Note: Medical    Dermatology Problem List:  1. Non-scarring alopecia: ddx psoriatic alopecia vs AA, now favoring female pattern hair loss, stable  - Scalp biopsy 2017 most consistent with psoriatic alopecia vs. alopecia areata, initially did not have any evidence of psoriasis elsewhere on body but rash on bilateral ears noted 18    - ILK 10 mg/cc 2017 and 2017 without much improvement.  - Patient stopped clobetasol 0.05% shampoo due to high cost  - Current Tx: alternate ketoconazole 2% shampoo and DHS zinc shampoo daily, laser comb TIW, Rogaine 5% foam daily, daily multivitamin, spironolactone 150 mg daily  2. Seborrheic Dermatitis  - Current Tx: derma-smoothe/fs oil 1-2x weekly for the winter months  3. History of dermatitis, behind ears  - s/p 0.1% Triamcinolone ointment (when needed)  4. History of low iron stores  - WNL 9/3/2020 and 2021  5. 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    Procedure Date: 2023    Attending Staff Surgeon: Kevin Ocampo MD     Fellow Surgeon: Kong Carrington MD     Assistant: Charu Lynne CMA     Operating Room Data:     Surgery/Procedure Date:    SAME      Pre-operative Diagnosis:   Scar  Location: left cheek  Size(cm2): 7 x 7 cm  Number of lesions: 1    Operation/Procedure    Vbeam pulsed dye laser treatment#: 5    Post-operative Diagnosis:  SAME     Laser Settings:  Cheek:  Energy: 6.5 J/cm2  Spot size:10 mm  Pulse width: 6 mS (0.45 thru 40 mS)  Dynamic cooling spray settin mS  Dynamic cooling device delay:  20 mS    Fotofinder photos: No    Anesthesia:  None    Description of Operation/Procedure:   The nature and purpose of the procedure, associated risks, possible consequences, complications and alternative methods of treatment were explained in detail, this includes but is not limited to hyperpigmentation, hypopigmentation, scarring, bruising, hair loss pain/discomfort, eye injury, hair loss,  and blister. We  reviewed that the outcome could be any of the following: no improvement, slight improvement or change in skin color & texture, the skin might be permanently lighter or darker, and though uncommon, superficial scarring may occur.  Multiple treatments may be recommended.     A photo and operative consent were obtained. Time-out was performed.The patient was positioned to optimally expose the area treated. Protective eyewear was worn by the patient and goggles on all personnel in the treatment room. The patient confirmed the site to be treated. The laser energy output was verified by meter reading.      The clinically evident lesion(s) was/were treated with Kathrin Vbeam pulsed dye laser (595 nm) beam as above. A total of 20 pulses were used. The patient tolerated the procedure well and no complications were noted. Post operative instructions were provided. The total laser operation and preparation time was 10 minutes.  The patient was counseled to call immediately for any issues and read the after visit summary for emergency contact information.    The patient will follow-up in 6-8 weeks for repeat treatment. Will discuss repeat PDL vs CO2.     The patient will NOT pay the cosmetic fee today.      Dr. Ocampo staffed the patient was present for the entire procedure.    Kong Carrington MD  Dermatology, PGY-5  Mohs surgery fellow      Attending attestation:  I was present for key elements of the procedure and immediately available for all other portions of the procedure.  I have reviewed the note and edited it as necessary.    Kevin Ocampo M.D.  Professor  Director of Dermatologic Surgery  Department of Dermatology  [facility]    Dermatology Surgery Clinic  [facility] [place] [phone]

## 2023-03-28 NOTE — PROGRESS NOTES
[facility] Dermatology Note  Encounter Date: Mar 31, 2023  Office Visit     Dermatology Problem List:  1. Non-scarring alopecia: ddx psoriatic alopecia vs AA, now favoring female pattern hair loss, stable  - Scalp biopsy 1/2017 most consistent with psoriatic alopecia vs. alopecia areata, initially did not have any evidence of psoriasis elsewhere on body but rash on bilateral ears noted 5/7/18    - ILK 10 mg/cc 4/2017 and 5/2017 without much improvement.  - Patient stopped clobetasol 0.05% shampoo due to high cost  - Current Tx: alternate ketoconazole 2% shampoo and DHS zinc shampoo daily, laser comb TIW, Rogaine 5% foam daily, daily multivitamin, spironolactone 150 mg daily  2. Seborrheic Dermatitis  - Current Tx: derma-smoothe/fs oil 1-2x weekly for the winter months  3. History of dermatitis, behind ears  - s/p 0.1% Triamcinolone ointment (when needed)  4. History of low iron stores  - WNL 9/3/2020 and 11/2021  5. 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 Milia, right medial cheek, s/p extraction 3/31/2023    # NUB, right anterior thigh, ddx: rule out dysplastic nevus, s/p shave bx 3/31/2023  # Nodule, right axilla, ddx: cyst vs other, pending ultrasound  ____________________________________________    Assessment & Plan:    # MIlia, right medial cheek. Attempted extraction today to confirm dx due to concerns of surrounding erythema.   - Extraction today, see procedure note below - consistent with milia    # NUB, right anterior thigh, ddx: rule out dysplastic nevus  - Shave biopsy today, see procedure note below    # Papulonodule, right axilla, ddx: cyst vs lipoma v LN v other  - Ultrasound ordered today to characterize    # Multiple benign nevi --> photo of trunk/ext taken today for clinical monitoring  # Solar lentigines   - Monitor for ABCDEs of melanoma   - Continue sun protection - recommend SPF 30 or higher with frequent application   - Return sooner if noticing changing or  symptomatic lesions    # Cherry angiomas  - Reassured patient of benign nature, no treatment necessary    Procedures Performed:   - Shave biopsy procedure note, location(s): see above. After discussion of benefits and risks including but not limited to bleeding, infection, scar, incomplete removal, recurrence, and non-diagnostic biopsy, written consent and photographs were obtained. The area was cleaned with isopropyl alcohol. 0.5mL of 1% lidocaine with epinephrine was injected to obtain adequate anesthesia of lesion(s). Shave biopsy at site(s) performed. Hemostasis was achieved with aluminium chloride. Petrolatum ointment and a sterile dressing were applied. The patient tolerated the procedure and no complications were noted. The patient was provided with verbal and written post care instructions.   - After verbal consent obtained, skin cleansed with alcohol prep pad and lesion nicked with #11 blade. Contents expressed. Patient tolerated procedure well.    Follow-up: 1 year(s) in-person, or earlier for new or changing lesions    Staff and Scribe:     Scribe Disclosure:  I, BEATRIZ COVINGTON, am serving as a scribe to document services personally performed by Tamar Marin MD based on data collection and the provider's statements to me.     ,Provider Disclosure:   The documentation recorded by the scribe accurately reflects the services I personally performed and the decisions made by me.    Tamar Marin MD    Department of Dermatology  [facility]: Phone: [phone], Fax: [phone]  3/31/2023     ____________________________________________    CC: Skin Check (FBSE. Patient has a spot of concern in her right arm pit.)    HPI:  Ms. Daily Vyas is a(n) 27 year old female who presents today as a return patient for FBSE. Last seen by Dr. Ocampo on 1/23/23, at which time the patient underwent PDL for treatment of  scar on the left cheek.     The patient reports a small lump that she thinks is an ingrown hair in her right armpit. It has been present for at least a year but denies any growing or changing. Numerous moles on her body. History of a fibrous histiocytoma on the left cheek. There is also a bump near her nose that has been present for 8-9 months.     Patient is otherwise feeling well, without additional skin concerns.    Labs Reviewed:  N/A    Physical Exam:  Vitals: There were no vitals taken for this visit.  SKIN: Full skin, excluding the groin, which includes the head/face, both arms, chest, back, abdomen,both legs, buttocks, digits and/or nails, was examined.  - Right anterior thigh, 4 mm two toned/medium light brown macule  - Right axilla, subtle somewhat rope-like subcutaneous papulonodule, no overlying punctum.  - Right medial cheek, white papule with slight surrounding erythema.  - Well healed scar on the left cheek.  - There are dome shaped bright red papules on the trunk and extremities.   - Multiple regular brown pigmented macules and papules are identified on the trunk and extremities.   - Scattered brown macules on sun exposed areas.   - No other lesions of concern on areas examined.     Medications:  Current Outpatient Medications   Medication     clobetasol propionate (CLOBEX) 0.05 % external shampoo     desogestrel-ethinyl estradiol (JOSS/VELIVET) 0.1/0.125/0.15 -0.025 MG tablet     Fluocinolone Acetonide Scalp 0.01 % OIL oil     ibuprofen (ADVIL/MOTRIN) 200 MG tablet     ketoconazole (NIZORAL) 2 % external shampoo     minoxidil (LONITEN) 2.5 MG tablet     spironolactone (ALDACTONE) 50 MG tablet     triamcinolone (KENALOG) 0.1 % ointment     clobetasol (TEMOVATE) 0.05 % external solution     No current facility-administered medications for this visit.      Past Medical History:   Patient Active Problem List   Diagnosis     Alopecia     Dermatitis, seborrheic     Loss of hair     Low iron stores      Past Medical History:   Diagnosis Date     Eczema      Nickel allergy     identified by allergy to belt buckle     Uncomplicated asthma         CC No referring provider defined for this encounter. on close of this encounter.

## 2023-03-31 ENCOUNTER — OFFICE VISIT (OUTPATIENT)
Dept: DERMATOLOGY | Facility: CLINIC | Age: 28
End: 2023-03-31
Payer: COMMERCIAL

## 2023-03-31 DIAGNOSIS — L72.0 MILIA: ICD-10-CM

## 2023-03-31 DIAGNOSIS — D18.01 CHERRY ANGIOMA: ICD-10-CM

## 2023-03-31 DIAGNOSIS — L90.5 SCAR: Primary | ICD-10-CM

## 2023-03-31 DIAGNOSIS — L81.4 SOLAR LENTIGO: ICD-10-CM

## 2023-03-31 DIAGNOSIS — D22.9 MULTIPLE BENIGN NEVI: ICD-10-CM

## 2023-03-31 DIAGNOSIS — R22.9 SUBCUTANEOUS NODULE: ICD-10-CM

## 2023-03-31 DIAGNOSIS — D49.2 NEOPLASM OF UNSPECIFIED BEHAVIOR OF BONE, SOFT TISSUE, AND SKIN: ICD-10-CM

## 2023-03-31 PROCEDURE — 11102 TANGNTL BX SKIN SINGLE LES: CPT | Performed by: DERMATOLOGY

## 2023-03-31 PROCEDURE — 88305 TISSUE EXAM BY PATHOLOGIST: CPT | Mod: 26 | Performed by: DERMATOLOGY

## 2023-03-31 PROCEDURE — 99213 OFFICE O/P EST LOW 20 MIN: CPT | Mod: 25 | Performed by: DERMATOLOGY

## 2023-03-31 PROCEDURE — 88305 TISSUE EXAM BY PATHOLOGIST: CPT | Mod: TC | Performed by: DERMATOLOGY

## 2023-03-31 NOTE — NURSING NOTE
Lidocaine-epinephrine 1-1:472799 % injection   1mL once for one use, starting 3/31/2023 ending 3/31/2023,  2mL disp, R-0, injection  Injected by Gina Nettles RN

## 2023-03-31 NOTE — NURSING NOTE
Dermatology Rooming Note    Daily Vyas's goals for this visit include:   Chief Complaint   Patient presents with     Skin Check     FBSE. Patient has a spot of concern in her right arm pit.     Christiano Hoffmann, EMT-B

## 2023-03-31 NOTE — LETTER
Date:March 31, 2023      Provider requested that no letter be sent. Do not send.       [facility]

## 2023-03-31 NOTE — PATIENT INSTRUCTIONS
Patient Education     Checking for Skin Cancer  You can find cancer early by checking your skin each month. There are 3 kinds of skin cancer. They are melanoma, basal cell carcinoma, and squamous cell carcinoma. Doing monthly skin checks is the best way to find new marks or skin changes. Follow the instructions below for checking your skin.   The ABCDEs of checking moles for melanoma   Check your moles or growths for signs of melanoma using ABCDE:   Asymmetry: the sides of the mole or growth don t match  Border: the edges are ragged, notched, or blurred  Color: the color within the mole or growth varies  Diameter: the mole or growth is larger than 6 mm (size of a pencil eraser)  Evolving: the size, shape, or color of the mole or growth is changing (evolving is not shown in the images below)    Checking for other types of skin cancer  Basal cell carcinoma or squamous cell carcinoma have symptoms such as:     A spot or mole that looks different from all other marks on your skin  Changes in how an area feels, such as itching, tenderness, or pain  Changes in the skin's surface, such as oozing, bleeding, or scaliness  A sore that does not heal  New swelling or redness beyond the border of a mole    Who s at risk?  Anyone can get skin cancer. But you are at greater risk if you have:   Fair skin, light-colored hair, or light-colored eyes  Many moles or abnormal moles on your skin  A history of sunburns from sunlight or tanning beds  A family history of skin cancer  A history of exposure to radiation or chemicals  A weakened immune system  If you have had skin cancer in the past, you are at risk for recurring skin cancer.   How to check your skin  Do your monthly skin checkups in front of a full-length mirror. Check all parts of your body, including your:   Head (ears, face, neck, and scalp)  Torso (front, back, and sides)  Arms (tops, undersides, upper, and lower armpits)  Hands (palms, backs, and fingers, including  under the nails)  Buttocks and genitals  Legs (front, back, and sides)  Feet (tops, soles, toes, including under the nails, and between toes)  If you have a lot of moles, take digital photos of them each month. Make sure to take photos both up close and from a distance. These can help you see if any moles change over time.   Most skin changes are not cancer. But if you see any changes in your skin, call your doctor right away. Only he or she can diagnose a problem. If you have skin cancer, seeing your doctor can be the first step toward getting the treatment that could save your life.   [employer] last reviewed this educational content on 4/1/2019 2000-2020 [employer]. [address] [place] [address], [place] [address]. All rights reserved. This information is not intended as a substitute for professional medical care. Always follow your healthcare professional's instructions.       When should I call my doctor?  If you are worsening or not improving, please, contact us or seek urgent care as noted below.     Who should I call with questions (adults)?  [facility] (adult and pediatric): [phone]  [facility] (adult): [phone]  For urgent needs outside of business hours call the [facility] at [phone] and ask for the dermatology resident on call to be paged  If this is a medical emergency and you are unable to reach an ER, Call [phone]    Who should I call with questions (pediatric)?  [facility]- Pediatric Dermatology  Dr. Libertad Lino, Dr. Danny Albright, Dr. Aileen Sevilla, CATE Bailey, Dr. Blanca Chisholm, Dr. Velvet Huston & Dr. Ruben Bates  Non-urgent nurse triage line; [phone]- Dana and Jessi BEEBE Care Coordinatorraúl Sanderson (/Complex ) [phone]    If you need a prescription refill, please contact your pharmacy. Refills are approved or denied by our  Physicians during normal business hours, Monday through Fridays  Per office policy, refills will not be granted if you have not been seen within the past year (or sooner depending on your child's condition)    Scheduling Information:  Pediatric Appointment Scheduling and Call Center [phone]  Radiology Scheduling- [phone]  Sedation Unit Scheduling- [phone]  [facility] Scheduling- General [phone]; Pediatric Dermatology [phone]  Main  Services: [phone]  Hungarian: [phone]  Swazi: [phone]  Hmong/Scottish/French: [phone]  Preadmission Nursing Department Fax Number: [phone] (Fax all pre-operative paperwork to this number)    For urgent matters arising during evenings, weekends, or holidays that cannot wait for normal business hours please call [phone] and ask for the dermatology resident on call to be paged. Wound Care After a Biopsy    What is a skin biopsy?  A skin biopsy allows the doctor to examine a very small piece of tissue under the microscope to determine the diagnosis and the best treatment for the skin condition. A local anesthetic (numbing medicine)  is injected with a very small needle into the skin area to be tested. A small piece of skin is taken from the area. Sometimes a suture (stitch) is used.     What are the risks of a skin biopsy?  I will experience scar, bleeding, swelling, pain, crusting and redness. I may experience incomplete removal or recurrence. Risks of this procedure are excessive bleeding, bruising, infection, nerve damage, numbness, thick (hypertrophic or keloidal) scar and non-diagnostic biopsy.    How should I care for my wound for the first 24 hours?  Keep the wound dry and covered for 24 hours  If it bleeds, hold direct pressure on the area for 15 minutes. If bleeding does not stop then go to the emergency room  Avoid strenuous exercise the first 1-2 days or as your doctor instructs you    How should I care for the wound  after 24 hours?  After 24 hours, remove the bandage  You may bathe or shower as normal  If you had a scalp biopsy, you can shampoo as usual and can use shower water to clean the biopsy site daily  Clean the wound twice a day with gentle soap and water  Do not scrub, be gentle  Apply white petroleum/Vaseline after cleaning the wound with a cotton swab or a clean finger, and keep the site covered with a Bandaid /bandage. Bandages are not necessary with a scalp biopsy  If you are unable to cover the site with a Bandaid /bandage, re-apply ointment 2-3 times a day to keep the site moist. Moisture will help with healing  Avoid strenuous activity for first 1-2 days  Avoid lakes, rivers, pools, and oceans until the stitches are removed or the site is healed    How do I clean my wound?  Wash hands thoroughly with soap or use hand  before all wound care  Clean the wound with gentle soap and water  Apply white petroleum/Vaseline  to wound after it is clean  Replace the Bandaid /bandage to keep the wound covered for the first few days or as instructed by your doctor  If you had a scalp biopsy, warm shower water to the area on a daily basis should suffice    What should I use to clean my wound?   Cotton-tipped applicators (Qtips )  White petroleum jelly (Vaseline ). Use a clean new container and use Q-tips to apply.  Bandaids   as needed  Gentle soap     How should I care for my wound long term?  Do not get your wound dirty  Keep up with wound care for one week or until the area is healed.  A small scab will form and fall off by itself when the area is completely healed. The area will be red and will become pink in color as it heals. Sun protection is very important for how your scar will turn out. Sunscreen with an SPF 30 or greater is recommended once the area is healed.  If you have stitches, stitches need to be removed in 14 days. You may return to our clinic for this or you may have it done locally at your doctor s  office.  You should have some soreness but it should be mild and slowly go away over several days. Talk to your doctor about using tylenol for pain,    When should I call my doctor?  If you have increased:   Pain or swelling  Pus or drainage (clear or slightly yellow drainage is ok)  Temperature over 100F  Spreading redness or warmth around wound    When will I hear about my results?  The biopsy results can take 2 weeks to come back.  Your results will automatically release to [employer] before your provider has even reviewed them.  The clinic will call you with the results, send you a "[employer] message, or have you schedule a follow-up clinic or phone time to discuss the results.  Contact our clinics if you do not hear from us in 2 weeks.    Who should I call with questions?  [facility]: [phone]  [facility]: [phone]  For urgent needs outside of business hours call the [facility] at [phone] and ask for the dermatology resident on call

## 2023-03-31 NOTE — LETTER
3/31/2023       RE: Daily Vyas  [address] [address]     Dear Colleague,    Thank you for referring your patient, Daily Vyas, to the [facility] [place] at [facility]. Please see a copy of my visit note below.    [facility] Dermatology Note  Encounter Date: Mar 31, 2023  Office Visit     Dermatology Problem List:  1. Non-scarring alopecia: ddx psoriatic alopecia vs AA, now favoring female pattern hair loss, stable  - Scalp biopsy 1/2017 most consistent with psoriatic alopecia vs. alopecia areata, initially did not have any evidence of psoriasis elsewhere on body but rash on bilateral ears noted 5/7/18    - ILK 10 mg/cc 4/2017 and 5/2017 without much improvement.  - Patient stopped clobetasol 0.05% shampoo due to high cost  - Current Tx: alternate ketoconazole 2% shampoo and DHS zinc shampoo daily, laser comb TIW, Rogaine 5% foam daily, daily multivitamin, spironolactone 150 mg daily  2. Seborrheic Dermatitis  - Current Tx: derma-smoothe/fs oil 1-2x weekly for the winter months  3. History of dermatitis, behind ears  - s/p 0.1% Triamcinolone ointment (when needed)  4. History of low iron stores  - WNL 9/3/2020 and 11/2021  5. 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 Milia, right medial cheek, s/p extraction 3/31/2023    # NUB, right anterior thigh, ddx: rule out dysplastic nevus, s/p shave bx 3/31/2023  # Nodule, right axilla, ddx: cyst vs other, pending ultrasound  ____________________________________________    Assessment & Plan:    # MIlia, right medial cheek. Attempted extraction today to confirm dx due to concerns of surrounding erythema.   - Extraction today, see procedure note below - consistent with milia    # NUB, right anterior thigh, ddx: rule out dysplastic nevus  - Shave biopsy today, see procedure note below    # Papulonodule, right axilla, ddx: cyst vs lipoma v LN v  other  - Ultrasound ordered today to characterize    # Multiple benign nevi --> photo of trunk/ext taken today for clinical monitoring  # Solar lentigines   - Monitor for ABCDEs of melanoma   - Continue sun protection - recommend SPF 30 or higher with frequent application   - Return sooner if noticing changing or symptomatic lesions    # Cherry angiomas  - Reassured patient of benign nature, no treatment necessary    Procedures Performed:   - Shave biopsy procedure note, location(s): see above. After discussion of benefits and risks including but not limited to bleeding, infection, scar, incomplete removal, recurrence, and non-diagnostic biopsy, written consent and photographs were obtained. The area was cleaned with isopropyl alcohol. 0.5mL of 1% lidocaine with epinephrine was injected to obtain adequate anesthesia of lesion(s). Shave biopsy at site(s) performed. Hemostasis was achieved with aluminium chloride. Petrolatum ointment and a sterile dressing were applied. The patient tolerated the procedure and no complications were noted. The patient was provided with verbal and written post care instructions.   - After verbal consent obtained, skin cleansed with alcohol prep pad and lesion nicked with #11 blade. Contents expressed. Patient tolerated procedure well.    Follow-up: 1 year(s) in-person, or earlier for new or changing lesions    Staff and Scribe:     Scribe Disclosure:  I, BEATRIZ COVINGTON, am serving as a scribe to document services personally performed by Tamar Marin MD based on data collection and the provider's statements to me.     ,Provider Disclosure:   The documentation recorded by the scribe accurately reflects the services I personally performed and the decisions made by me.    Tamar Marin MD    Department of Dermatology  [facility]: Phone: [phone], Fax: [phone]  3/31/2023      ____________________________________________    CC: Skin Check (FBSE. Patient has a spot of concern in her right arm pit.)    HPI:  Ms. Daily Vyas is a(n) 27 year old female who presents today as a return patient for FBSE. Last seen by Dr. Ocampo on 1/23/23, at which time the patient underwent PDL for treatment of scar on the left cheek.     The patient reports a small lump that she thinks is an ingrown hair in her right armpit. It has been present for at least a year but denies any growing or changing. Numerous moles on her body. History of a fibrous histiocytoma on the left cheek. There is also a bump near her nose that has been present for 8-9 months.     Patient is otherwise feeling well, without additional skin concerns.    Labs Reviewed:  N/A    Physical Exam:  Vitals: There were no vitals taken for this visit.  SKIN: Full skin, excluding the groin, which includes the head/face, both arms, chest, back, abdomen,both legs, buttocks, digits and/or nails, was examined.  - Right anterior thigh, 4 mm two toned/medium light brown macule  - Right axilla, subtle somewhat rope-like subcutaneous papulonodule, no overlying punctum.  - Right medial cheek, white papule with slight surrounding erythema.  - Well healed scar on the left cheek.  - There are dome shaped bright red papules on the trunk and extremities.   - Multiple regular brown pigmented macules and papules are identified on the trunk and extremities.   - Scattered brown macules on sun exposed areas.   - No other lesions of concern on areas examined.     Medications:  Current Outpatient Medications   Medication     clobetasol propionate (CLOBEX) 0.05 % external shampoo     desogestrel-ethinyl estradiol (JOSS/VELIVET) 0.1/0.125/0.15 -0.025 MG tablet     Fluocinolone Acetonide Scalp 0.01 % OIL oil     ibuprofen (ADVIL/MOTRIN) 200 MG tablet     ketoconazole (NIZORAL) 2 % external shampoo     minoxidil (LONITEN) 2.5 MG tablet     spironolactone  (ALDACTONE) 50 MG tablet     triamcinolone (KENALOG) 0.1 % ointment     clobetasol (TEMOVATE) 0.05 % external solution     No current facility-administered medications for this visit.      Past Medical History:   Patient Active Problem List   Diagnosis     Alopecia     Dermatitis, seborrheic     Loss of hair     Low iron stores     Past Medical History:   Diagnosis Date     Eczema      Nickel allergy     identified by allergy to belt buckle     Uncomplicated asthma         CC No referring provider defined for this encounter. on close of this encounter.      Again, thank you for allowing me to participate in the care of your patient.      Sincerely,    Tamar Marin MD

## 2023-04-04 ENCOUNTER — OFFICE VISIT (OUTPATIENT)
Dept: DERMATOLOGY | Facility: CLINIC | Age: 28
End: 2023-04-04
Payer: COMMERCIAL

## 2023-04-04 DIAGNOSIS — L90.5 SCAR: Primary | ICD-10-CM

## 2023-04-04 PROCEDURE — 99024 POSTOP FOLLOW-UP VISIT: CPT | Mod: GC | Performed by: DERMATOLOGY

## 2023-04-04 ASSESSMENT — PAIN SCALES - GENERAL: PAINLEVEL: NO PAIN (0)

## 2023-04-04 NOTE — NURSING NOTE
Chief Complaint   Patient presents with     Laser Treatment     PDL face       Charu EMMANUEL CMA      Dermatology Laser Intake Checklist:  History of psoriasis: No  History of recent tan, indoor or outdoor tanning/vacation or other sun exposure: No  History of vitiligo: No  Family history of vitiligo: N/A  Recent other cosmetic procedure(microderm abrasion/peel/hair removal/facial etc): No  History of HSV: No  Did the patient start valtrex: N/A  For genital laser hair removal patient only: Is there a history of genital warts or condyloma: No  Tattoo in the area to be treated: No  Is patient using hydroquinone: No  Retinoids and other acne medications stopped for 2 weeks: No  Has the patient had accutane in the last 6-12 months: No  Pregnant or breastfeeding: No  History of skin cancer in area planned for treatment: Yes  History of treatment with gold: No  Changes in medical history: No  Photos obtained: Yes  Does the patient smoke: No  Is the patient on ibuprofen/aspirin/plavix/coumadin/other blood thinner: No  If patient is taking narcotic or diazepam(valium)-does patient have : N/A  There were no vitals taken for this visit.

## 2023-04-04 NOTE — LETTER
4/4/2023       RE: Daily Vyas  [address] [address]     Dear Colleague,    Thank you for referring your patient, Daily Vyas, to the [facility] DERMATOLOGIC SURGERY CLINIC [place] [facility]. Please see a copy of my visit note below.    Laser- VBeam(Pulsed Dye Laser) Procedure Note: Medical      Dermatology Problem List:  1. Non-scarring alopecia: ddx psoriatic alopecia vs AA, now favoring female pattern hair loss, stable  - Scalp biopsy 1/2017 most consistent with psoriatic alopecia vs. alopecia areata, initially did not have any evidence of psoriasis elsewhere on body but rash on bilateral ears noted 5/7/18    - ILK 10 mg/cc 4/2017 and 5/2017 without much improvement.  - Patient stopped clobetasol 0.05% shampoo due to high cost  - Current Tx: alternate ketoconazole 2% shampoo and DHS zinc shampoo daily, laser comb TIW, Rogaine 5% foam daily, daily multivitamin, spironolactone 150 mg daily  2. Seborrheic Dermatitis  - Current Tx: derma-smoothe/fs oil 1-2x weekly for the winter months  3. History of dermatitis, behind ears  - s/p 0.1% Triamcinolone ointment (when needed)  4. History of low iron stores  - WNL 9/3/2020 and 11/2021  5. 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 Milia, right medial cheek, s/p extraction 3/31/2023  7. Junctional dysplastic nevus with moderate atypia, right anterior thigh, s/p shave bx 3/31/2023  # Nodule, right axilla, ddx: cyst vs other, pending ultrasound    Procedure Date: Apr 4, 2023    Attending Staff Surgeon: Kevin Ocampo MD    Fellow Surgeon: Kong Carrington MD    Assistant: Charu Lynne CMA    Operating Room Data:     Surgery/Procedure Date:    SAME     Pre-operative Diagnosis:   Scar  Location: left cheek   Size(cm2): 7 x 7 cm  Number of lesions: 1    Operation/Procedure    Vbeam pulsed dye laser treatment#: 6     Post-operative Diagnosis:  SAME    Laser Settings:  Energy: 6.5  J/cm2  Spot size:10mm   Pulse width:  6 mS (0.45 thru 40 mS)  Dynamic cooling spray settin mS  Dynamic cooling device delay:  20 mS    Fotofinder photos: No    Anesthesia:  None    Description of Operation/Procedure:   The nature and purpose of the procedure, associated risks, possible consequences, complications and alternative methods of treatment were explained in detail, this includes but is not limited to hyperpigmentation, hypopigmentation, scarring, bruising, hair loss pain/discomfort, eye injury, hair loss,  and blister. We reviewed that the outcome could be any of the following: no improvement, slight improvement or change in skin color & texture, the skin might be permanently lighter or darker, and though uncommon, superficial scarring may occur.  Multiple treatments may be recommended. The patient was counseled was counseled that this laser is being used off label.   A photo and operative consent were obtained. Time-out was performed.The patient was positioned to optimally expose the area treated. Protective eyewear was worn by the patient and goggles on all personnel in the treatment room. The patient confirmed the site to be treated. The laser energy output was verified by meter reading.      The clinically evident lesion(s) was/were treated with Kathrin Vbeam pulsed dye laser (595 nm) beam as above. A total of 20 pulses were used. The patient tolerated the procedure well and no complications were noted. Post operative instructions were provided. The total laser operation and preparation time was 10 minutes.  The patient was counseled to call immediately for any issues and read the after visit summary for emergency contact information. The patient was counseled that [employer] messaging response may be delayed by several days, therefore, phone is preferred for emergency issues.     The patient will follow-up PRN for touch up. Patient will reach out if she is interested in Sculptra.     The patient  will NOT pay the cosmetic fee today.     Dr. Ocampo staffed the patient was present for identifying and marking target area(s)    Kong Carrington MD  Dermatology, PGY-5  Mohs surgery fellow    Staff Involved:  Scribe/Staff    Scribe Disclosure:  I, BEATRIZ COVINGTON, am serving as a scribe to document services personally performed by Kevin Ocampo MD based on data collection and the provider's statements to me.       Attending attestation:  I was present for key elements of the procedure and immediately available for all other portions of the procedure.  I have reviewed the note and edited it as necessary.    Kevin Ocampo M.D.  Professor  Director of Dermatologic Surgery  Department of Dermatology  [facility]    Dermatology Surgery Clinic  [facility] Surgery [place] [id number]

## 2023-04-04 NOTE — PROGRESS NOTES
Laser- VBeam(Pulsed Dye Laser) Procedure Note: Medical      Dermatology Problem List:  1. Non-scarring alopecia: ddx psoriatic alopecia vs AA, now favoring female pattern hair loss, stable  - Scalp biopsy 2017 most consistent with psoriatic alopecia vs. alopecia areata, initially did not have any evidence of psoriasis elsewhere on body but rash on bilateral ears noted 18    - ILK 10 mg/cc 2017 and 2017 without much improvement.  - Patient stopped clobetasol 0.05% shampoo due to high cost  - Current Tx: alternate ketoconazole 2% shampoo and DHS zinc shampoo daily, laser comb TIW, Rogaine 5% foam daily, daily multivitamin, spironolactone 150 mg daily  2. Seborrheic Dermatitis  - Current Tx: derma-smoothe/fs oil 1-2x weekly for the winter months  3. History of dermatitis, behind ears  - s/p 0.1% Triamcinolone ointment (when needed)  4. History of low iron stores  - WNL 9/3/2020 and 2021  5. 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 Milia, right medial cheek, s/p extraction 3/31/2023  7. Junctional dysplastic nevus with moderate atypia, right anterior thigh, s/p shave bx 3/31/2023  # Nodule, right axilla, ddx: cyst vs other, pending ultrasound    Procedure Date: 2023    Attending Staff Surgeon: Kevin Ocampo MD    Fellow Surgeon: Kong Carrington MD    Assistant: Charu Lynne CMA    Operating Room Data:     Surgery/Procedure Date:    SAME     Pre-operative Diagnosis:   Scar  Location: left cheek   Size(cm2): 7 x 7 cm  Number of lesions: 1    Operation/Procedure    Vbeam pulsed dye laser treatment#: 6     Post-operative Diagnosis:  SAME    Laser Settings:  Energy: 6.5 J/cm2  Spot size:10mm   Pulse width:  6 mS (0.45 thru 40 mS)  Dynamic cooling spray settin mS  Dynamic cooling device delay:  20 mS    Fotofinder photos: No    Anesthesia:  None    Description of Operation/Procedure:   The nature and purpose of the procedure, associated risks, possible consequences, complications and  alternative methods of treatment were explained in detail, this includes but is not limited to hyperpigmentation, hypopigmentation, scarring, bruising, hair loss pain/discomfort, eye injury, hair loss,  and blister. We reviewed that the outcome could be any of the following: no improvement, slight improvement or change in skin color & texture, the skin might be permanently lighter or darker, and though uncommon, superficial scarring may occur.  Multiple treatments may be recommended. The patient was counseled was counseled that this laser is being used off label.   A photo and operative consent were obtained. Time-out was performed.The patient was positioned to optimally expose the area treated. Protective eyewear was worn by the patient and goggles on all personnel in the treatment room. The patient confirmed the site to be treated. The laser energy output was verified by meter reading.      The clinically evident lesion(s) was/were treated with Kathrin Vbeam pulsed dye laser (595 nm) beam as above. A total of 20 pulses were used. The patient tolerated the procedure well and no complications were noted. Post operative instructions were provided. The total laser operation and preparation time was 10 minutes.  The patient was counseled to call immediately for any issues and read the after visit summary for emergency contact information. The patient was counseled that [employer] messaging response may be delayed by several days, therefore, phone is preferred for emergency issues.     The patient will follow-up PRN for touch up. Patient will reach out if she is interested in Sculptra.     The patient will NOT pay the cosmetic fee today.     Dr. Ocampo staffed the patient was present for identifying and marking target area(s)    Kong Carrington MD  Dermatology, PGY-5  Mohs surgery fellow    Staff Involved:  Scribe/Staff    Scribe Disclosure:  BEATRIZ SENIOR, am serving as a scribe to document services personally  performed by Kevin Ocampo MD based on data collection and the provider's statements to me.       Attending attestation:  I was present for key elements of the procedure and immediately available for all other portions of the procedure.  I have reviewed the note and edited it as necessary.    Kevin Ocampo M.D.  Professor  Director of Dermatologic Surgery  Department of Dermatology  [facility]    Dermatology Surgery Clinic  [facility] Surgery [place] [phone]

## 2023-04-13 ENCOUNTER — ANCILLARY PROCEDURE (OUTPATIENT)
Dept: MAMMOGRAPHY | Facility: CLINIC | Age: 28
End: 2023-04-13
Attending: DERMATOLOGY
Payer: COMMERCIAL

## 2023-04-13 DIAGNOSIS — D49.2 NEOPLASM OF UNSPECIFIED BEHAVIOR OF BONE, SOFT TISSUE, AND SKIN: ICD-10-CM

## 2023-04-13 PROCEDURE — 76882 US LMTD JT/FCL EVL NVASC XTR: CPT | Mod: RT | Performed by: RADIOLOGY

## 2023-04-17 ENCOUNTER — OFFICE VISIT (OUTPATIENT)
Dept: PODIATRY | Facility: CLINIC | Age: 28
End: 2023-04-17
Payer: COMMERCIAL

## 2023-04-17 ENCOUNTER — LAB (OUTPATIENT)
Dept: LAB | Facility: CLINIC | Age: 28
End: 2023-04-17
Payer: COMMERCIAL

## 2023-04-17 VITALS — DIASTOLIC BLOOD PRESSURE: 74 MMHG | SYSTOLIC BLOOD PRESSURE: 120 MMHG | WEIGHT: 234 LBS | BODY MASS INDEX: 36.3 KG/M2

## 2023-04-17 DIAGNOSIS — M21.41 ACQUIRED PES PLANUS, RIGHT: ICD-10-CM

## 2023-04-17 DIAGNOSIS — M21.42 PES PLANUS OF LEFT FOOT: Primary | ICD-10-CM

## 2023-04-17 DIAGNOSIS — R74.01 ELEVATED AST (SGOT): ICD-10-CM

## 2023-04-17 LAB
ALBUMIN SERPL-MCNC: 3.8 G/DL (ref 3.4–5)
ALP SERPL-CCNC: 94 U/L (ref 40–150)
ALT SERPL W P-5'-P-CCNC: 27 U/L (ref 0–50)
AST SERPL W P-5'-P-CCNC: 15 U/L (ref 0–45)
BILIRUB DIRECT SERPL-MCNC: <0.1 MG/DL (ref 0–0.2)
BILIRUB SERPL-MCNC: 0.3 MG/DL (ref 0.2–1.3)
PROT SERPL-MCNC: 7.1 G/DL (ref 6.8–8.8)

## 2023-04-17 PROCEDURE — 80076 HEPATIC FUNCTION PANEL: CPT

## 2023-04-17 PROCEDURE — 99203 OFFICE O/P NEW LOW 30 MIN: CPT | Performed by: PODIATRIST

## 2023-04-17 PROCEDURE — 36415 COLL VENOUS BLD VENIPUNCTURE: CPT

## 2023-04-17 ASSESSMENT — PAIN SCALES - GENERAL: PAINLEVEL: MILD PAIN (2)

## 2023-04-17 NOTE — PATIENT INSTRUCTIONS
PATIENT INSTRUCTIONS - Podiatry / Foot & Ankle Surgery    [facility] LOCATIONS  [facility]  [address]  [place] [address]  Phone: [phone]  Fax: [phone] [facility]  [address]  [place] [address]  Phone: [phone]   Fax: [phone]   [facility]  [address]  [place] [address]  Phone: [phone]  Fax: [phone] [facility]  [address]  [place] [address]  Phone: [phone]   Fax: [phone]   [facility]   [address]  [place] [address]  Phone: [phone]  Fax: [phone] * Please call any location listed to make an appointment for a casting/fitting. Your referral was sent to their central office and they will all have the order on file.         Over the counter:  SuperFeet orthotics  SuperFeet are over the counter (OTC), you do not need a prescription.  Wear Superfeet orthotics in all of your shoes.  Remove the existing liner and replace it with SuperFeet.    SuperFeet are available on [employer], at [employer] and most specialty running stores.  Blue or berry color

## 2023-04-17 NOTE — LETTER
4/17/2023         RE: Daily Vyas  [address] [address]        Dear Colleague,    Thank you for referring your patient, Daily Vyas, to the [facility]. Please see a copy of my visit note below.    Assessment:      ICD-10-CM    1. Pes planus of left foot  M21.42 [facility] DME Orthotic; Foot Orthotics     CANCELED: XR Foot Bilateral G/E 3 Views      2. Acquired pes planus, right  M21.41 [facility] DME Orthotic; Foot Orthotics             Plan:    Pt is looking for orthotics only.    Return for Weight Bearing foot/ankle x-rays if symptoms not improved with orthotics        Mary Prado DPM                              Chief Complaint:     Patient presents with:  Left Foot - Pain: Had custom orthotics in the past. Wishes to get order for orthotics again. Discomfort with long hikes. Pes planus.  Right Foot - Pain       HPI:  Daily Vyas is a 27 year old year old female who presents for foot concern.      Had orthotics in the past, helped    Past Medical & Surgical History:  Past Medical History:   Diagnosis Date     Eczema      Nickel allergy     identified by allergy to belt buckle     Uncomplicated asthma       Past Surgical History:   Procedure Laterality Date     BIOPSY       HEAD & NECK SURGERY        Family History   Problem Relation Age of Onset     Factor IX deficiency Mother      Diabetes Maternal Grandmother      Melanoma No family hx of      Skin Cancer No family hx of         Social History:  ?  History   Smoking Status     Never   Smokeless Tobacco     Never     History   Drug Use Unknown     Social History    Substance and Sexual Activity      Alcohol use: Yes        Comment: Socially      Allergies:  ?   Allergies   Allergen Reactions     Fexofenadine Other (See Comments)     Bloody nose  Other reaction(s): Bloody Nose  Other reaction(s): Bloody Nose        Medications:    Current Outpatient Medications    Medication     clobetasol (TEMOVATE) 0.05 % external solution     clobetasol propionate (CLOBEX) 0.05 % external shampoo     desogestrel-ethinyl estradiol (JOSS/VELIVET) 0.1/0.125/0.15 -0.025 MG tablet     Fluocinolone Acetonide Scalp 0.01 % OIL oil     ibuprofen (ADVIL/MOTRIN) 200 MG tablet     ketoconazole (NIZORAL) 2 % external shampoo     minoxidil (LONITEN) 2.5 MG tablet     spironolactone (ALDACTONE) 50 MG tablet     triamcinolone (KENALOG) 0.1 % ointment     No current facility-administered medications for this visit.       Physical Exam:    Vitals:  [unfilled]  General:  WD/WN, in NAD.  A&O x3.  Dermatologic:  Skin is intact, open lesions absent.   Skin texture, turgor is normal.  Vascular:  Digital capillary refill time normal bilateral.  Skin temperature is normal bilateral.  Neurologic:    Gross sensation normal.  Gait and balance normal.  Musculoskeletal:  Mild pain to palpation of plantar 1st TMTJ b/l.  No pain to palpation sinus tarsi, PT tendon b/l  Double limb heel rise-  heel  reducible bilateral.  Single limb heel rise- pain absent,  weakness absent bilateral.     Stance:   Moderate to significant valgus b/l            Again, thank you for allowing me to participate in the care of your patient.        Sincerely,        Mary Prado DPM

## 2023-04-17 NOTE — PROGRESS NOTES
Assessment:      ICD-10-CM    1. Pes planus of left foot  M21.42 [facility] DME Orthotic; Foot Orthotics     CANCELED: XR Foot Bilateral G/E 3 Views      2. Acquired pes planus, right  M21.41 [facility] DME Orthotic; Foot Orthotics             Plan:    Pt is looking for orthotics only.    Return for Weight Bearing foot/ankle x-rays if symptoms not improved with orthotics        Mary Prado DPM                              Chief Complaint:     Patient presents with:  Left Foot - Pain: Had custom orthotics in the past. Wishes to get order for orthotics again. Discomfort with long hikes. Pes planus.  Right Foot - Pain       HPI:  Daily Vyas is a 27 year old year old female who presents for foot concern.      Had orthotics in the past, helped    Past Medical & Surgical History:  Past Medical History:   Diagnosis Date     Eczema      Nickel allergy     identified by allergy to belt buckle     Uncomplicated asthma       Past Surgical History:   Procedure Laterality Date     BIOPSY       HEAD & NECK SURGERY        Family History   Problem Relation Age of Onset     Factor IX deficiency Mother      Diabetes Maternal Grandmother      Melanoma No family hx of      Skin Cancer No family hx of         Social History:  ?  History   Smoking Status     Never   Smokeless Tobacco     Never     History   Drug Use Unknown     Social History    Substance and Sexual Activity      Alcohol use: Yes        Comment: Socially      Allergies:  ?   Allergies   Allergen Reactions     Fexofenadine Other (See Comments)     Bloody nose  Other reaction(s): Bloody Nose  Other reaction(s): Bloody Nose        Medications:    Current Outpatient Medications   Medication     clobetasol (TEMOVATE) 0.05 % external solution     clobetasol propionate (CLOBEX) 0.05 % external shampoo     desogestrel-ethinyl estradiol (JOSS/VELIVET) 0.1/0.125/0.15 -0.025 MG tablet     Fluocinolone Acetonide Scalp 0.01 % OIL oil      ibuprofen (ADVIL/MOTRIN) 200 MG tablet     ketoconazole (NIZORAL) 2 % external shampoo     minoxidil (LONITEN) 2.5 MG tablet     spironolactone (ALDACTONE) 50 MG tablet     triamcinolone (KENALOG) 0.1 % ointment     No current facility-administered medications for this visit.       Physical Exam:    Vitals:  [unfilled]  General:  WD/WN, in NAD.  A&O x3.  Dermatologic:  Skin is intact, open lesions absent.   Skin texture, turgor is normal.  Vascular:  Digital capillary refill time normal bilateral.  Skin temperature is normal bilateral.  Neurologic:    Gross sensation normal.  Gait and balance normal.  Musculoskeletal:  Mild pain to palpation of plantar 1st TMTJ b/l.  No pain to palpation sinus tarsi, PT tendon b/l  Double limb heel rise-  heel  reducible bilateral.  Single limb heel rise- pain absent,  weakness absent bilateral.     Stance:   Moderate to significant valgus b/l

## 2023-04-24 NOTE — NURSING NOTE
Dermatology Rooming Note    Daily Vyas's goals for this visit include:   Chief Complaint   Patient presents with     Hair Loss     Non-scarring alopecia - Daily notes that she is still loosing hair.      Annette Couch, CMA   Non-Graft Cartilage Fenestration Text: The cartilage was fenestrated with a 2mm punch biopsy to help facilitate healing.

## 2023-05-03 NOTE — PROGRESS NOTES
[facility] Dermatology Note  Encounter Date: May 12, 2023  Office Visit     Dermatology Problem List:  1. Non-scarring alopecia: ddx psoriatic alopecia vs AA, now favoring female pattern hair loss, stable  - Scalp biopsy 1/2017 most consistent with psoriatic alopecia vs. alopecia areata, initially did not have any evidence of psoriasis elsewhere on body but rash on bilateral ears noted 5/7/18    - ILK 10 mg/cc 4/2017 and 5/2017 without much improvement.  - Patient stopped clobetasol 0.05% shampoo due to high cost  - Current Tx: alternate ketoconazole 2% shampoo and DHS zinc shampoo daily, laser comb TIW, Rogaine 5% foam daily, daily multivitamin, spironolactone 150 mg daily, restart fluocinolone oil until end of May 2023  - Labs Pending 5/12/23: CBC, CMP, Ferritin, Iron, Testosterone (on spironolactone/combined OCP)  - Hair metrix: 11/1/21, 5/9/22, 11/7/22, 5/12/23  2. Seborrheic Dermatitis  - Current Tx: derma-smoothe/fs oil 1-2x weekly for the winter months  3. History of dermatitis, behind ears  - s/p 0.1% Triamcinolone ointment (when needed)  4. History of low iron stores  - WNL 9/3/2020 and 11/2021  5. 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 Milia, right medial cheek, s/p extraction 3/31/2023  7. Junctional dysplastic nevus with moderate atypia, right anterior thigh, s/p shave bx 3/31/2023  # Nodule, right axilla, ddx: cyst vs other, pending ultrasound  ____________________________________________    Assessment & Plan:     # Non-scarring alopecia presently favoring female pattern hair loss: previously ddx w/ psoriatic alopecia vs AA. Overall stable to improved compared to last visit. Discussed addition of low dose oral minoxidil, patient is interested in trying. Given that she is on spironolactone, we will repeat safety labs today prior to initiation.  - Hair metrix performed today   - Labs ordered: CBC, CMP, Iron studies (ferritin, iron), androgen studies (free and total  testosterone; note, pt currently on Spironolactone and combined OCP)  - Continue spironolactone 150mg daily. BP today: 129/83.   - Continue minoxidil 5% foam to scalp nightly.   - Continue alternating ketoconazole 2% shampoo and [employer] & Shou[employer]ers  - Continue with laser comb 3x weekly  - Restart derma-smoothe/FS oil at least until end of May (scalp dry today)  - Start low-dose oral minoxidil pending lab results.      Procedures Performed:   Hair Metrix:  - Frontal Scalp: 135>158, scale but improved  - Mid-Scalp:165>180, scale but improved  - Vertex Scalp: 158>177 scale improved  - Occipital Scalp: 169>151  - Right Temporal Scalp: 135>120  - Left Temporal Scalp: 109>129    Follow-up: 6 month(s) in-person, or earlier for new or changing lesions    Staff and Medical Student:     Seen and Staffed with Dr. Velvet Barillas, MS4  [facility] [employer]    Staff Physician:  I was present with the medical student who participated in the service and in the documentation of the note. I have verified the history and personally performed the physical exam and medical decision making. I agree with the assessment and plan of care as documented in the note.       Velvet Huston MD  Professor and Chair  Department of Dermatology  [facility]: Phone: [phone], Fax:[phone]  [facility]: Phone: [phone], Fax: [phone]        ____________________________________________    CC: No chief complaint on file.    HPI:  Ms. Daily Vyas is a 27 year old female who presents as a return patient for follow-up of hair loss, diagnosed as non scarring alopecia.   - Last seen in-clinic on 4/4/23 by Dr. Ocampo   - Shedding or thinning, or both: stable shedding  - Current tx:  alternate ketoconazole 2% shampoo and DHS zinc shampoo daily, laser comb TIW, Rogaine 5% foam daily, daily  multivitamin, spironolactone 150 mg daily  - If using Rogaine, 1 cannister lasts how lon month  - Scalp or hair care habits/products: Ketoconazole 2% shampoo, DHS zinc shampoo  Yes Any new medications, supplements, or products? (please list below)  - changed multivitamin to one with higher iron back in December     No Scalp pain   No Scalp burning   No Scalp itching    No Eyebrow changes    No Eyelash changes   N/A Beard changes    No Other body hair changes    No Nail changes    No Additional symptoms? (please list below)     - Overall course: stable, maybe improved  - COVID status: yes, 2022    Patient is otherwise feeling well, in usual state of health, and has no additional skin concerns today.     Labs:  CBC , CMP , Iron studies  and TSH reviewed.    Physical Exam:  Vitals: There were no vitals taken for this visit.  GEN: Well developed, well-nourished, in no acute distress, in a pleasant mood.    SKIN: Focused examination of scalp, face and hands was performed.  - Hammad part width of 1.1, 1.2  - Vaughn 2  - The layers of hair regrowth layers were noted to be  - 1-2 cm for the first  - 3-4 cm at the second  - 6-7 cm at the third   - 10-12 cm at the fourth   - mild diffuse erythema   - No perifollicular erythema  - No perifollicular scale   - mild scaling of the scalp   -  negative hair pull test   - normal eyelash density  - normal eyebrow density  - no nail pitting or dystrophy   - no scalp folliculitis/pustules   - In comparison to prior photographs, improved, particularly in hair part  - No other lesions of concern on areas examined.       Medications:  Current Outpatient Medications   Medication     clobetasol (TEMOVATE) 0.05 % external solution     clobetasol propionate (CLOBEX) 0.05 % external shampoo     desogestrel-ethinyl estradiol (JOSS/VELIVET) 0.1/0.125/0.15 -0.025 MG tablet     Fluocinolone Acetonide Scalp 0.01 % OIL oil     ibuprofen (ADVIL/MOTRIN) 200 MG tablet     ketoconazole  (NIZORAL) 2 % external shampoo     minoxidil (LONITEN) 2.5 MG tablet     spironolactone (ALDACTONE) 50 MG tablet     triamcinolone (KENALOG) 0.1 % ointment     No current facility-administered medications for this visit.      Past Medical History:   Patient Active Problem List   Diagnosis     Alopecia     Dermatitis, seborrheic     Loss of hair     Low iron stores     Past Medical History:   Diagnosis Date     Eczema      Nickel allergy     identified by allergy to belt buckle     Uncomplicated asthma        CC Neena Massey MD  No address on file on close of this encounter.

## 2023-05-12 ENCOUNTER — OFFICE VISIT (OUTPATIENT)
Dept: DERMATOLOGY | Facility: CLINIC | Age: 28
End: 2023-05-12
Payer: COMMERCIAL

## 2023-05-12 ENCOUNTER — LAB (OUTPATIENT)
Dept: LAB | Facility: CLINIC | Age: 28
End: 2023-05-12
Payer: COMMERCIAL

## 2023-05-12 VITALS — SYSTOLIC BLOOD PRESSURE: 129 MMHG | HEART RATE: 100 BPM | DIASTOLIC BLOOD PRESSURE: 83 MMHG

## 2023-05-12 DIAGNOSIS — L65.9 LOSS OF HAIR: ICD-10-CM

## 2023-05-12 DIAGNOSIS — L30.9 DERMATITIS: ICD-10-CM

## 2023-05-12 DIAGNOSIS — L65.9 LOSS OF HAIR: Primary | ICD-10-CM

## 2023-05-12 LAB
ALBUMIN SERPL BCG-MCNC: 4.6 G/DL (ref 3.5–5.2)
ALP SERPL-CCNC: 99 U/L (ref 35–104)
ALT SERPL W P-5'-P-CCNC: 16 U/L (ref 10–35)
ANION GAP SERPL CALCULATED.3IONS-SCNC: 8 MMOL/L (ref 7–15)
AST SERPL W P-5'-P-CCNC: 18 U/L (ref 10–35)
BASOPHILS # BLD AUTO: 0.1 10E3/UL (ref 0–0.2)
BASOPHILS NFR BLD AUTO: 1 %
BILIRUB SERPL-MCNC: 0.3 MG/DL
BUN SERPL-MCNC: 10.6 MG/DL (ref 6–20)
CALCIUM SERPL-MCNC: 10.2 MG/DL (ref 8.6–10)
CHLORIDE SERPL-SCNC: 106 MMOL/L (ref 98–107)
CREAT SERPL-MCNC: 0.78 MG/DL (ref 0.51–0.95)
DEPRECATED HCO3 PLAS-SCNC: 26 MMOL/L (ref 22–29)
EOSINOPHIL # BLD AUTO: 0.1 10E3/UL (ref 0–0.7)
EOSINOPHIL NFR BLD AUTO: 2 %
ERYTHROCYTE [DISTWIDTH] IN BLOOD BY AUTOMATED COUNT: 12.7 % (ref 10–15)
FERRITIN SERPL-MCNC: 38 NG/ML (ref 6–175)
GFR SERPL CREATININE-BSD FRML MDRD: >90 ML/MIN/1.73M2
GLUCOSE SERPL-MCNC: 91 MG/DL (ref 70–99)
HCT VFR BLD AUTO: 43.6 % (ref 35–47)
HGB BLD-MCNC: 14.6 G/DL (ref 11.7–15.7)
IMM GRANULOCYTES # BLD: 0 10E3/UL
IMM GRANULOCYTES NFR BLD: 0 %
IRON BINDING CAPACITY (ROCHE): 363 UG/DL (ref 240–430)
IRON SATN MFR SERPL: 27 % (ref 15–46)
IRON SERPL-MCNC: 98 UG/DL (ref 37–145)
LYMPHOCYTES # BLD AUTO: 1.9 10E3/UL (ref 0.8–5.3)
LYMPHOCYTES NFR BLD AUTO: 27 %
MCH RBC QN AUTO: 29.8 PG (ref 26.5–33)
MCHC RBC AUTO-ENTMCNC: 33.5 G/DL (ref 31.5–36.5)
MCV RBC AUTO: 89 FL (ref 78–100)
MONOCYTES # BLD AUTO: 0.6 10E3/UL (ref 0–1.3)
MONOCYTES NFR BLD AUTO: 9 %
NEUTROPHILS # BLD AUTO: 4.3 10E3/UL (ref 1.6–8.3)
NEUTROPHILS NFR BLD AUTO: 61 %
NRBC # BLD AUTO: 0 10E3/UL
NRBC BLD AUTO-RTO: 0 /100
PLATELET # BLD AUTO: 360 10E3/UL (ref 150–450)
POTASSIUM SERPL-SCNC: 4.8 MMOL/L (ref 3.4–5.3)
PROT SERPL-MCNC: 7.5 G/DL (ref 6.4–8.3)
RBC # BLD AUTO: 4.9 10E6/UL (ref 3.8–5.2)
SHBG SERPL-SCNC: 120 NMOL/L (ref 30–135)
SODIUM SERPL-SCNC: 140 MMOL/L (ref 136–145)
WBC # BLD AUTO: 7 10E3/UL (ref 4–11)

## 2023-05-12 PROCEDURE — 84403 ASSAY OF TOTAL TESTOSTERONE: CPT | Performed by: PATHOLOGY

## 2023-05-12 PROCEDURE — 82728 ASSAY OF FERRITIN: CPT | Performed by: PATHOLOGY

## 2023-05-12 PROCEDURE — 80053 COMPREHEN METABOLIC PANEL: CPT | Performed by: PATHOLOGY

## 2023-05-12 PROCEDURE — 99000 SPECIMEN HANDLING OFFICE-LAB: CPT | Performed by: PATHOLOGY

## 2023-05-12 PROCEDURE — 84270 ASSAY OF SEX HORMONE GLOBUL: CPT | Performed by: PATHOLOGY

## 2023-05-12 PROCEDURE — 36415 COLL VENOUS BLD VENIPUNCTURE: CPT | Performed by: PATHOLOGY

## 2023-05-12 PROCEDURE — 83540 ASSAY OF IRON: CPT | Performed by: PATHOLOGY

## 2023-05-12 PROCEDURE — 83550 IRON BINDING TEST: CPT | Performed by: PATHOLOGY

## 2023-05-12 PROCEDURE — 85025 COMPLETE CBC W/AUTO DIFF WBC: CPT | Performed by: PATHOLOGY

## 2023-05-12 PROCEDURE — 99214 OFFICE O/P EST MOD 30 MIN: CPT | Performed by: DERMATOLOGY

## 2023-05-12 ASSESSMENT — PAIN SCALES - GENERAL: PAINLEVEL: NO PAIN (0)

## 2023-05-12 NOTE — NURSING NOTE
Chief Complaint   Patient presents with     Hair Loss     Pt following up for hair loss.     Curt Yan, EMT

## 2023-05-12 NOTE — LETTER
5/12/2023       RE: Daily Vyas  [address] [facility] [address]     Dear Colleague,    Thank you for referring your patient, Daily Vyas, to the [facility] DERMATOLOGY CLINIC [place] [facility]. Please see a copy of my visit note below.    [facility] Dermatology Note  Encounter Date: May 12, 2023  Office Visit     Dermatology Problem List:  1. Non-scarring alopecia: ddx psoriatic alopecia vs AA, now favoring female pattern hair loss, stable  - Scalp biopsy 1/2017 most consistent with psoriatic alopecia vs. alopecia areata, initially did not have any evidence of psoriasis elsewhere on body but rash on bilateral ears noted 5/7/18    - ILK 10 mg/cc 4/2017 and 5/2017 without much improvement.  - Patient stopped clobetasol 0.05% shampoo due to high cost  - Current Tx: alternate ketoconazole 2% shampoo and DHS zinc shampoo daily, laser comb TIW, Rogaine 5% foam daily, daily multivitamin, spironolactone 150 mg daily, restart fluocinolone oil until end of May 2023  - Labs Pending 5/12/23: CBC, CMP, Ferritin, Iron, Testosterone (on spironolactone/combined OCP)  - Hair metrix: 11/1/21, 5/9/22, 11/7/22, 5/12/23  2. Seborrheic Dermatitis  - Current Tx: derma-smoothe/fs oil 1-2x weekly for the winter months  3. History of dermatitis, behind ears  - s/p 0.1% Triamcinolone ointment (when needed)  4. History of low iron stores  - WNL 9/3/2020 and 11/2021  5. 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 Milia, right medial cheek, s/p extraction 3/31/2023  7. Junctional dysplastic nevus with moderate atypia, right anterior thigh, s/p shave bx 3/31/2023  # Nodule, right axilla, ddx: cyst vs other, pending ultrasound  ____________________________________________    Assessment & Plan:     # Non-scarring alopecia presently favoring female pattern hair loss: previously ddx w/ psoriatic alopecia vs AA. Overall stable to improved  compared to last visit. Discussed addition of low dose oral minoxidil, patient is interested in trying. Given that she is on spironolactone, we will repeat safety labs today prior to initiation.  - Hair metrix performed today   - Labs ordered: CBC, CMP, Iron studies (ferritin, iron), androgen studies (free and total testosterone; note, pt currently on Spironolactone and combined OCP)  - Continue spironolactone 150mg daily. BP today: 129/83.   - Continue minoxidil 5% foam to scalp nightly.   - Continue alternating ketoconazole 2% shampoo and [employer]  - Continue with laser comb 3x weekly  - Restart derma-smoothe/FS oil at least until end of May (scalp dry today)  - Start low-dose oral minoxidil pending lab results.      Procedures Performed:   Hair Metrix:  - Frontal Scalp: 135>158, scale but improved  - Mid-Scalp:165>180, scale but improved  - Vertex Scalp: 158>177 scale improved  - Occipital Scalp: 169>151  - Right Temporal Scalp: 135>120  - Left Temporal Scalp: 109>129    Follow-up: 6 month(s) in-person, or earlier for new or changing lesions    Staff and Medical Student:     Seen and Staffed with Dr. Velvet Barillas, MS4  [employer]    Staff Physician:  I was present with the medical student who participated in the service and in the documentation of the note. I have verified the history and personally performed the physical exam and medical decision making. I agree with the assessment and plan of care as documented in the note.       Velvet Huston MD  Professor and Chair  Department of Dermatology  [facility]: Phone: [phone], Fax:[phone]  [facility]: Phone: [phone], Fax: [phone]        ____________________________________________    CC: No chief complaint on file.    HPI:  Ms. Daily Vyas is a 27 year old female who presents  as a return patient for follow-up of hair loss, diagnosed as non scarring alopecia.   - Last seen in-clinic on 23 by Dr. Ocampo   - Shedding or thinning, or both: stable shedding  - Current tx:  alternate ketoconazole 2% shampoo and DHS zinc shampoo daily, laser comb TIW, Rogaine 5% foam daily, daily multivitamin, spironolactone 150 mg daily  - If using Rogaine, 1 cannister lasts how lon month  - Scalp or hair care habits/products: Ketoconazole 2% shampoo, DHS zinc shampoo  Yes Any new medications, supplements, or products? (please list below)  - changed multivitamin to one with higher iron back in December     No Scalp pain   No Scalp burning   No Scalp itching    No Eyebrow changes    No Eyelash changes   N/A Beard changes    No Other body hair changes    No Nail changes    No Additional symptoms? (please list below)     - Overall course: stable, maybe improved  - COVID status: yes, 2022    Patient is otherwise feeling well, in usual state of health, and has no additional skin concerns today.     Labs:  CBC , CMP , Iron studies  and TSH reviewed.    Physical Exam:  Vitals: There were no vitals taken for this visit.  GEN: Well developed, well-nourished, in no acute distress, in a pleasant mood.    SKIN: Focused examination of scalp, face and hands was performed.  - Hammad part width of 1.1, 1.2  - Vaughn 2  - The layers of hair regrowth layers were noted to be  - 1-2 cm for the first  - 3-4 cm at the second  - 6-7 cm at the third   - 10-12 cm at the fourth   - mild diffuse erythema   - No perifollicular erythema  - No perifollicular scale   - mild scaling of the scalp   -  negative hair pull test   - normal eyelash density  - normal eyebrow density  - no nail pitting or dystrophy   - no scalp folliculitis/pustules   - In comparison to prior photographs, improved, particularly in hair part  - No other lesions of concern on areas examined.       Medications:  Current Outpatient Medications   Medication      clobetasol (TEMOVATE) 0.05 % external solution     clobetasol propionate (CLOBEX) 0.05 % external shampoo     desogestrel-ethinyl estradiol (JOSS/VELIVET) 0.1/0.125/0.15 -0.025 MG tablet     Fluocinolone Acetonide Scalp 0.01 % OIL oil     ibuprofen (ADVIL/MOTRIN) 200 MG tablet     ketoconazole (NIZORAL) 2 % external shampoo     minoxidil (LONITEN) 2.5 MG tablet     spironolactone (ALDACTONE) 50 MG tablet     triamcinolone (KENALOG) 0.1 % ointment     No current facility-administered medications for this visit.      Past Medical History:   Patient Active Problem List   Diagnosis     Alopecia     Dermatitis, seborrheic     Loss of hair     Low iron stores     Past Medical History:   Diagnosis Date     Eczema      Nickel allergy     identified by allergy to belt buckle     Uncomplicated asthma        CC Neena Massey MD  No address on file on close of this encounter.      Again, thank you for allowing me to participate in the care of your patient.      Sincerely,    Velvet Huston MD

## 2023-05-16 LAB
TESTOST FREE SERPL-MCNC: 0.2 NG/DL
TESTOST SERPL-MCNC: 29 NG/DL (ref 8–60)

## 2023-06-08 ENCOUNTER — TELEPHONE (OUTPATIENT)
Dept: SURGERY | Facility: CLINIC | Age: 28
End: 2023-06-08
Payer: COMMERCIAL

## 2023-06-08 NOTE — TELEPHONE ENCOUNTER
PREVISIT INFORMATION                                                    Daily Vyas scheduled for future visit at [facility] specialty clinics.    Patient is scheduled to see Dr. Ramirez on 6/9/23  Reason for visit: Facial scar  Referring provider Kevin Ocampo MD  Has patient seen previous specialist? No  Medical Records:  Available in chart.  Patient was previously seen at a [facility] or [facility] facility.    REVIEW                                                      New patient packet mailed to patient: No  Medication reconciliation complete: No      Current Outpatient Medications   Medication Sig Dispense Refill     clobetasol (TEMOVATE) 0.05 % external solution Apply to affected areas of scalp (above ears) once daily. (Patient not taking: Reported on 7/12/2022) 50 mL 3     clobetasol propionate (CLOBEX) 0.05 % external shampoo APPLY TO DRY SCALP, LEAVE ON 10 MINS THEN LATHER & RINSE*DO EVERY OTHER DAY ROTATE W/KETOCONAZOLE 118 mL 0     desogestrel-ethinyl estradiol (JOSS/VELIVET) 0.1/0.125/0.15 -0.025 MG tablet Take 1 tablet by mouth daily 84 tablet 3     Fluocinolone Acetonide Scalp 0.01 % OIL oil Apply 1-2 caps full to scalp for 4 hours, preferably overnight, then shampoo. Apply 1-2 times weekly. 118 mL 1     ibuprofen (ADVIL/MOTRIN) 200 MG tablet Take 200 mg by mouth as needed       ketoconazole (NIZORAL) 2 % external shampoo APPLY & LATHER ONTO DAMP SCALP & LEAVE ON 3-5MIN FOR 3 TIMES WEEKLY 120 mL 4     minoxidil (LONITEN) 2.5 MG tablet Take 1/2 tablet once daily (1.25 mg) 60 tablet 3     spironolactone (ALDACTONE) 50 MG tablet Take 3 tablets (150 mg) by mouth daily 270 tablet 3     triamcinolone (KENALOG) 0.1 % ointment Apply topically 2 times daily To ears for 2-3 weeks, then a few times per week for maintenance. 80 g 3       Allergies: Fexofenadine        PLAN/FOLLOW-UP NEEDED                                                      Previsit review complete.  Patient will  see provider at future scheduled appointment.     Patient Reminders Given:  Please, make sure you bring an updated list of your medications.   If you are having a procedure, please, present 15 minutes early.  If you need to cancel or reschedule,please call [phone].    Domenica Cordero LPN

## 2023-06-09 ENCOUNTER — OFFICE VISIT (OUTPATIENT)
Dept: SURGERY | Facility: CLINIC | Age: 28
End: 2023-06-09
Attending: DERMATOLOGY
Payer: COMMERCIAL

## 2023-06-09 VITALS — BODY MASS INDEX: 33.74 KG/M2 | HEIGHT: 67 IN | WEIGHT: 215 LBS

## 2023-06-09 DIAGNOSIS — L90.5 SCAR: ICD-10-CM

## 2023-06-09 PROCEDURE — 99205 OFFICE O/P NEW HI 60 MIN: CPT | Performed by: PLASTIC SURGERY

## 2023-06-09 NOTE — LETTER
6/9/2023         RE: Daily Vyas  [address] [facility] [address]        Dear Colleague,    Thank you for referring your patient, Daily Vyas, to the [facility]. Please see a copy of my visit note below.    CONSULT NOTE    REFERRING PROVIDER:  Kevin Ocampo    PRESENTING COMPLAINT:  Consultation for left facial Mohs reconstruction with asymmetry.    HISTORY OF PRESENTING COMPLAINT:  Ms. Vyas is 27 years old.  She was unfortunately diagnosed with a fibrous histiocytoma of the left cheek requiring Mohs excision and reconstruction with a cheek advancement flap and a full-thickness graft back in 03/2022.  She also underwent postoperative PDL and CO2 laser over the year.  Overall, she is happy with the aesthetics except for a few things that she wants to know if she can improve.  First is the indentation, second is some of the wideness of the scar in certain areas, and third is the fullness in the lower cheek.  She has no functional deficits.    PAST MEDICAL HISTORY:  Alopecia, eczema and asthma.    PAST SURGICAL HISTORY:  Mohs reconstruction.    MEDICATIONS:  Spironolactone and oral contraceptive pill.    ALLERGIES:  Fexofenadine.    SOCIAL HISTORY:  Does not smoke or drink.  Works in a [employer].  Lives in [place].    REVIEW OF SYSTEMS:  Denies chest pain, shortness of breath, MI, CVA, DVT and PE.    PHYSICAL EXAMINATION:  Vital signs are stable.  She is afebrile, in no obvious distress.  She is 5 feet 7 inches, 215 pounds, BMI 33 kg/m2.  On examination of her face on the left side, she has a cheek advancement flap with a transverse scar across the lower lid/cheek junction and then an anterior incision across the medial labial fold area.  She has some fullness in the lower end of this scar, probably from the standing cutaneous cones.  She has a significant depression in the malar area compared to the right side.  Overall, the scar quality is good.  It has matured  well, narrow, flat and good color match.  Certain areas are a little wide, especially in the transverse mid portion and in the vertical mid portion.    ASSESSMENT AND PLAN:  Based upon the above findings, a diagnosis of Mohs surgery for a tumor with reconstruction with asymmetries was made.  I had a colleen, detailed discussion with the patient in the presence of my resident, Pastor Yates, who was present from beginning to end.  I discussed with the patient and her mother in detail the issues at hand.  I think overall improvements can be made in the facial indentation as well as the fullness in the lower portion of the vertical scar.  I think fat grafting would be the way to proceed.  This may require stages.  I was very clear about expectation management and the fact that the scar itself cannot be fully pushed out, but the fullness can be improved.  Other options would be the free flaps versus implants were also discussed.  Temporary fillers were also discussed.  All these concepts were explained in detail.  The widened scars could be narrowed at the same time; however, that may cause ectropion for the lid in the superior aspect and some lip retraction in the inferior aspect.  She will think about it and let me know how she wants to proceed.  In the interim, we will try and get prior authorization.  All her questions were answered.  She was happy with the visit.  I will see her back if she wants to proceed.    Total time spent in the encounter today, including chart review, the visit itself and post visit paperwork, was 60 minutes.     Socorro Ramirez MD     cc:  Kevin BOURGEOIS [facility]  [address] [place] [address]        Again, thank you for allowing me to participate in the care of your patient.        Sincerely,        CHRISTELLE Ramirez MD

## 2023-06-09 NOTE — PROGRESS NOTES
CONSULT NOTE    REFERRING PROVIDER:  Kevin Ocampo    PRESENTING COMPLAINT:  Consultation for left facial Mohs reconstruction with asymmetry.    HISTORY OF PRESENTING COMPLAINT:  Ms. Vyas is 27 years old.  She was unfortunately diagnosed with a fibrous histiocytoma of the left cheek requiring Mohs excision and reconstruction with a cheek advancement flap and a full-thickness graft back in 03/2022.  She also underwent postoperative PDL and CO2 laser over the year.  Overall, she is happy with the aesthetics except for a few things that she wants to know if she can improve.  First is the indentation, second is some of the wideness of the scar in certain areas, and third is the fullness in the lower cheek.  She has no functional deficits.    PAST MEDICAL HISTORY:  Alopecia, eczema and asthma.    PAST SURGICAL HISTORY:  Mohs reconstruction.    MEDICATIONS:  Spironolactone and oral contraceptive pill.    ALLERGIES:  Fexofenadine.    SOCIAL HISTORY:  Does not smoke or drink.  Works in a [email or web address].  Lives in [place].    REVIEW OF SYSTEMS:  Denies chest pain, shortness of breath, MI, CVA, DVT and PE.    PHYSICAL EXAMINATION:  Vital signs are stable.  She is afebrile, in no obvious distress.  She is 5 feet 7 inches, 215 pounds, BMI 33 kg/m2.  On examination of her face on the left side, she has a cheek advancement flap with a transverse scar across the lower lid/cheek junction and then an anterior incision across the medial labial fold area.  She has some fullness in the lower end of this scar, probably from the standing cutaneous cones.  She has a significant depression in the malar area compared to the right side.  Overall, the scar quality is good.  It has matured well, narrow, flat and good color match.  Certain areas are a little wide, especially in the transverse mid portion and in the vertical mid portion.    ASSESSMENT AND PLAN:  Based upon the above findings, a diagnosis of Mohs surgery for a tumor with  reconstruction with asymmetries was made.  I had a colleen, detailed discussion with the patient in the presence of my resident, Pastor Yates, who was present from beginning to end.  I discussed with the patient and her mother in detail the issues at hand.  I think overall improvements can be made in the facial indentation as well as the fullness in the lower portion of the vertical scar.  I think fat grafting would be the way to proceed.  This may require stages.  I was very clear about expectation management and the fact that the scar itself cannot be fully pushed out, but the fullness can be improved.  Other options would be the free flaps versus implants were also discussed.  Temporary fillers were also discussed.  All these concepts were explained in detail.  The widened scars could be narrowed at the same time; however, that may cause ectropion for the lid in the superior aspect and some lip retraction in the inferior aspect.  She will think about it and let me know how she wants to proceed.  In the interim, we will try and get prior authorization.  All her questions were answered.  She was happy with the visit.  I will see her back if she wants to proceed.    Total time spent in the encounter today, including chart review, the visit itself and post visit paperwork, was 60 minutes.     Socorro Ramirez MD     cc:  Kevin Ocampo  [facility]  [address] [place] [id number]

## 2023-06-09 NOTE — NURSING NOTE
Per Dr. Ramirez  photos to be taken. Name and  confirmed with patient then photos taken with patient facing forward, and side photos..Marisela Serrano RN

## 2023-06-09 NOTE — NURSING NOTE
"Daily Vyas's chief complaint for this visit includes:  Chief Complaint   Patient presents with     New Patient     consult facial scar revision // appt per patient // referred by Kevin Ocampo     PCP: No Ref-Primary, Physician    Referring Provider:  Kevin Ocampo MD  [address] [place] [address]     1.702 m (5' 7\")   Wt 97.5 kg (215 lb)   BMI 33.67 kg/m    Data Unavailable        Allergies   Allergen Reactions     Fexofenadine Other (See Comments)     Bloody nose  Other reaction(s): Bloody Nose  Other reaction(s): Bloody Nose         Do you need any medication refills at today's visit? NO    Steffi banuelos Clinic Visit Facilitator- Surgical Specialties       "

## 2023-06-14 ENCOUNTER — TELEPHONE (OUTPATIENT)
Dept: SURGERY | Facility: CLINIC | Age: 28
End: 2023-06-14
Payer: COMMERCIAL

## 2023-06-14 NOTE — TELEPHONE ENCOUNTER
Kevin, thanks for the consult! Discussed options and most likely fat grafting and some minor scar revisions could help.     PA team, we need to PA this and see if covered.     If so, please let me know and the patient know so we can plan surgery if they want to proceed.     Thanks     Socorro

## 2023-06-16 NOTE — TELEPHONE ENCOUNTER
PB DOS: TBD *Pending CPT Codes   Type of Procedure: fat grafting and some minor scar revisions   CPT Codes:   ICD10 Codes:  Surgeon/Ordering provider: CHRISTELLE Ramirez MD [id number]  Pre-cert/Authorization completed:    Payer: [facility] [place]  Spoke to [employer]  Ref. # / Auth #   Valid Dates:     This is not a guarantee of payment. Payment is subject to the patient's plan benefits, medical necessity and eligibility at the time services are rendered.

## 2023-06-20 NOTE — TELEPHONE ENCOUNTER
PB DOS: TBD   Type of Procedure: fat grafting and some minor scar revisions   CPT Codes:   98254- Grafting of autologous fat harvested by liposuction technique to face, eyelids, mouth, neck, ears, orbits, genitalia, hands, and/or feet; 25 cc or less injectate            15793- Grafting of autologous fat harvested by liposuction technique to face, eyelids, mouth, neck, ears, orbits, genitalia, hands, and/or feet; each additional 25 cc injectate, or part thereof (List separately in addition to code for primary procedure)          85266- Grafting of autologous soft tissue, other, harvested by direct excision (eg, fat, dermis, fascia)           17862- Excision, other benign lesion including margins, except skin tag (unless listed elsewhere), face, ears, eyelids, nose, lips, mucous membrane; excised diameter 0.5 cm or less     06221- Excision, other benign lesion including margins, except skin tag (unless listed elsewhere), face, ears, eyelids, nose, lips, mucous membrane; excised diameter 0.6 to 1.0 cm       43518- Excision, other benign lesion including margins, except skin tag (unless listed elsewhere), face, ears, eyelids, nose, lips, mucous membrane; excised diameter 1.1 to 2.0 cm       32899- Excision, other benign lesion including margins, except skin tag (unless listed elsewhere), face, ears, eyelids, nose, lips, mucous membrane; excised diameter 2.1 to 3.0 cm       87268- Excision, other benign lesion including margins, except skin tag (unless listed elsewhere), face, ears, eyelids, nose, lips, mucous membrane; excised diameter 3.1 to 4.0 cm       00958- Excision, other benign lesion including margins, except skin tag (unless listed elsewhere), face, ears, eyelids, nose, lips, mucous membrane; excised diameter over 4.0 cm          10535- Repair, intermediate, wounds of face, ears, eyelids, nose, lips and/or mucous membranes; 2.5 cm or less   55490- Repair, intermediate, wounds of face, ears, eyelids, nose, lips  and/or mucous membranes; 2.6 cm to 5.0 cm      80434- Repair, intermediate, wounds of face, ears, eyelids, nose, lips and/or mucous membranes; 5.1 cm to 7.5 cm                        57996- Repair, complex, forehead, cheeks, chin, mouth, neck, axillae, genitalia, hands and/or feet; 1.1 cm to 2.5 cm                  22367- Repair, complex, forehead, cheeks, chin, mouth, neck, axillae, genitalia, hands and/or feet; 2.6 cm to 7.5 cm                    ICD10 Codes:L90.5  Z98.890    Surgeon/Ordering provider: CHRISTELLE Ramirez MD [id number]  Pre-cert/Authorization completed:  no PA required   Payer: [facility] [place]  Spoke to [employer]  Ref. # / Auth #   Valid Dates:     This is not a guarantee of payment. Payment is subject to the patient's plan benefits, medical necessity and eligibility at the time services are rendered.

## 2023-08-14 DIAGNOSIS — L65.9 ALOPECIA: ICD-10-CM

## 2023-08-14 DIAGNOSIS — L21.9 DERMATITIS, SEBORRHEIC: ICD-10-CM

## 2023-08-14 DIAGNOSIS — L63.9 AA (ALOPECIA AREATA): ICD-10-CM

## 2023-08-14 DIAGNOSIS — L65.9 LOSS OF HAIR: ICD-10-CM

## 2023-08-14 NOTE — TELEPHONE ENCOUNTER
ketoconazole (NIZORAL) 2 % external shampoo     spironolactone (ALDACTONE) 50 MG tablet     [facility] [address] [place] [facility], [place] - [facility]       Last seen: May 2023, next appointment: November 2023

## 2023-08-16 NOTE — TELEPHONE ENCOUNTER
spironolactone (ALDACTONE) 50 MG tablet  Last Written Prescription Date:   8/18/2020  Last Fill Quantity: 270,   # refills: 3  Last Office Visit :  5/12/2023  Future Office visit:  11/14/2023  Routing refill request to provider for review/approval because:  Gaps in refills.  Last filled Aug 2020  Refer to Provider for review     ketoconazole (NIZORAL) 2 % external shampoo  Last Written Prescription Date:   11/1/2021  Last Fill Quantity: 120  # refills: 4  Last Office Visit :  5/12/2023  Future Office visit:  11/14/2023  Routing refill request to provider for review/approval because:  Gaps in refills.  Last filled Nov 2021  Refer to Provider for review     Claudette Slade RN  Central Triage Red Flags/Med Refills

## 2023-08-18 RX ORDER — SPIRONOLACTONE 50 MG/1
150 TABLET, FILM COATED ORAL DAILY
Qty: 270 TABLET | Refills: 1 | Status: SHIPPED | OUTPATIENT
Start: 2023-08-18 | End: 2024-03-19

## 2023-08-18 RX ORDER — KETOCONAZOLE 20 MG/ML
SHAMPOO TOPICAL
Qty: 120 ML | Refills: 11 | Status: SHIPPED | OUTPATIENT
Start: 2023-08-18 | End: 2024-03-19

## 2023-10-10 ENCOUNTER — OFFICE VISIT (OUTPATIENT)
Dept: PLASTIC SURGERY | Facility: CLINIC | Age: 28
End: 2023-10-10

## 2023-10-10 DIAGNOSIS — D23.9 FIBROUS HISTIOCYTOMA: Primary | ICD-10-CM

## 2023-10-10 NOTE — LETTER
10/10/2023       RE: Daily Vyas  [address] [facility] [id number]     Dear Colleague,    Thank you for referring your patient, Daily Vyas, to the [facility] at [facility]. Please see a copy of my visit note below.    Facial Plastic and Reconstructive Surgery Consultation  10/10/23     HPI:   I had the pleasure of seeing Daily Vyas today in clinic for consultation for surgical facial rejuvenation. Daily Vyas is a 28 year old female. She was diagnosed with a fibrous histiocytoma of the left cheek and underwent mohs resection and reconstruction with an advancement flap and full thickness graft with Dr. Kevin Ocampo. She has undergone PDL and CO2 laser treatment of the area as well. She presents today for discussion of further reconstruction. She is bothered by the large volume deficit that she now has at the left inferior malar eminence from the tumor removal.         Review Of Systems  ROS: 10 point ROS neg other than the symptoms noted above in the HPI.    Patient Active Problem List   Diagnosis     Alopecia     Dermatitis, seborrheic     Loss of hair     Low iron stores     Past Surgical History:   Procedure Laterality Date     BIOPSY       HEAD & NECK SURGERY       Current Outpatient Medications   Medication Sig Dispense Refill     clobetasol (TEMOVATE) 0.05 % external solution Apply to affected areas of scalp (above ears) once daily. (Patient not taking: Reported on 7/12/2022) 50 mL 3     clobetasol propionate (CLOBEX) 0.05 % external shampoo APPLY TO DRY SCALP, LEAVE ON 10 MINS THEN LATHER & RINSE*DO EVERY OTHER DAY ROTATE W/KETOCONAZOLE 118 mL 0     desogestrel-ethinyl estradiol (JOSS/VELIVET) 0.1/0.125/0.15 -0.025 MG tablet Take 1 tablet by mouth daily 84 tablet 3     Fluocinolone Acetonide Scalp 0.01 % OIL oil Apply 1-2 caps full to scalp for 4 hours, preferably overnight, then shampoo. Apply 1-2 times weekly.  118 mL 1     ibuprofen (ADVIL/MOTRIN) 200 MG tablet Take 200 mg by mouth as needed       ketoconazole (NIZORAL) 2 % external shampoo APPLY & LATHER ONTO DAMP SCALP & LEAVE ON 3-5MIN FOR 3 TIMES WEEKLY 120 mL 11     minoxidil (LONITEN) 2.5 MG tablet Take 1/2 tablet once daily (1.25 mg) 60 tablet 3     spironolactone (ALDACTONE) 50 MG tablet Take 3 tablets (150 mg) by mouth daily 270 tablet 1     triamcinolone (KENALOG) 0.1 % ointment Apply topically 2 times daily To ears for 2-3 weeks, then a few times per week for maintenance. 80 g 3     Fexofenadine  Social History     Socioeconomic History     Marital status: Single     Spouse name: Not on file     Number of children: Not on file     Years of education: Not on file     Highest education level: Not on file   Occupational History     Not on file   Tobacco Use     Smoking status: Never     Smokeless tobacco: Never   Vaping Use     Vaping Use: Never used   Substance and Sexual Activity     Alcohol use: Yes     Comment: Socially     Drug use: Never     Sexual activity: Not Currently     Partners: Male     Birth control/protection: Condom, Pill   Other Topics Concern     Parent/sibling w/ CABG, MI or angioplasty before 65F 55M? No   Social History Narrative     Not on file     Social Determinants of Health     Financial Resource Strain: Not on file   Food Insecurity: Not on file   Transportation Needs: Not on file   Physical Activity: Not on file   Stress: Not on file   Social Connections: Not on file   Interpersonal Safety: Not on file   Housing Stability: Not on file     Family History   Problem Relation Age of Onset     Factor IX deficiency Mother      Diabetes Maternal Grandmother      Melanoma No family hx of      Skin Cancer No family hx of        PE:  Alert and Oriented, Answering Questions Appropriately  Atraumatic, Normocephalic  There is scarring of the left face consistent with the adjacent tissue rearrangement and extends from the infraorbital crease  medially and inferiorly to the site of where the nasolabial fold would be. There is a significant volume deficit just inferior to the left malar eminence. There is some tension on the tissue here as well.   Skin: Edmond 1-2  Facial Nerve Intact and facial movement symmetric  EOMI  Nasal Exam: No external Deformity  Lips/Teeth/Toungue/Gums: Lips intact  Neck: Trachea midline  Chest: No wheezing, cyanosis, or stridor  Card: not diaphoretic  Neuro/Psych: CN's 2-12 intact, Moves all extremities, ambulation in intact, positive affect, no notable muscle weakness         IMPRESSION/PLAN: Daily Vyas is a 28 year old female who underwent excision and reconstruction of a left cheek fibrous histiocytoma on March 2022 and is here to discuss reconstructive options. She has a volume deficit present at the left inferior malar eminence along with expected scarring from the adjacent tissue rearrangement. We discussed options to add volume to this area including a customized implant, fat grafting, and dermal fillers. We discussed at length the risks and benefits of each of these options. With an implant, we discussed a separate incision would need to be made intraorally and possibly       ***Risks and benefits were discussed including but not limited to bleeding, hematoma, infection, scarring, asymmetry, irregularities, numbness/tingling (temporary or permanent), damage to motor nerves (temporary or permanent), need for additional procedures.       Photodocumentation was obtained.         Updated photodocumentation obtained.    Rebecca Mathur RN  10/10/2023 10:35 AM      Again, thank you for allowing me to participate in the care of your patient.      Sincerely,    Kelly Manuel MD

## 2023-10-10 NOTE — LETTER
October 10, 2023  Re: Daily Vyas  1995    Dear Dr. Alexander,    Thank you so much for referring Daily Vyas to the [facility]. I had the pleasure of visiting with Daily today.     Attached you will find a copy of my note. Please feel free to reach out to me with any questions, [phone].     Facial Plastic and Reconstructive Surgery Consultation  10/10/23     HPI:   I had the pleasure of seeing Daily Vyas today in clinic for consultation for surgical facial rejuvenation. Daily Vyas is a 28 year old female. She was diagnosed with a fibrous histiocytoma of the left cheek and underwent mohs resection and reconstruction with an advancement flap and full thickness graft with Dr. Kevin Ocampo. She has undergone PDL and CO2 laser treatment of the area as well. She presents today for discussion of further reconstruction. She is bothered by the large volume deficit that she now has at the left inferior malar eminence from the tumor removal.         Review Of Systems  ROS: 10 point ROS neg other than the symptoms noted above in the HPI.    Patient Active Problem List   Diagnosis     Alopecia     Dermatitis, seborrheic     Loss of hair     Low iron stores     Past Surgical History:   Procedure Laterality Date     BIOPSY       HEAD & NECK SURGERY       Current Outpatient Medications   Medication Sig Dispense Refill     clobetasol (TEMOVATE) 0.05 % external solution Apply to affected areas of scalp (above ears) once daily. (Patient not taking: Reported on 7/12/2022) 50 mL 3     clobetasol propionate (CLOBEX) 0.05 % external shampoo APPLY TO DRY SCALP, LEAVE ON 10 MINS THEN LATHER & RINSE*DO EVERY OTHER DAY ROTATE W/KETOCONAZOLE 118 mL 0     desogestrel-ethinyl estradiol (JOSS/VELIVET) 0.1/0.125/0.15 -0.025 MG tablet Take 1 tablet by mouth daily 84 tablet 3     Fluocinolone Acetonide Scalp 0.01 % OIL oil Apply 1-2 caps full to scalp for 4 hours, preferably overnight, then shampoo. Apply 1-2  times weekly. 118 mL 1     ibuprofen (ADVIL/MOTRIN) 200 MG tablet Take 200 mg by mouth as needed       ketoconazole (NIZORAL) 2 % external shampoo APPLY & LATHER ONTO DAMP SCALP & LEAVE ON 3-5MIN FOR 3 TIMES WEEKLY 120 mL 11     minoxidil (LONITEN) 2.5 MG tablet Take 1/2 tablet once daily (1.25 mg) 60 tablet 3     spironolactone (ALDACTONE) 50 MG tablet Take 3 tablets (150 mg) by mouth daily 270 tablet 1     triamcinolone (KENALOG) 0.1 % ointment Apply topically 2 times daily To ears for 2-3 weeks, then a few times per week for maintenance. 80 g 3     Fexofenadine  Social History     Socioeconomic History     Marital status: Single     Spouse name: Not on file     Number of children: Not on file     Years of education: Not on file     Highest education level: Not on file   Occupational History     Not on file   Tobacco Use     Smoking status: Never     Smokeless tobacco: Never   Vaping Use     Vaping Use: Never used   Substance and Sexual Activity     Alcohol use: Yes     Comment: Socially     Drug use: Never     Sexual activity: Not Currently     Partners: Male     Birth control/protection: Condom, Pill   Other Topics Concern     Parent/sibling w/ CABG, MI or angioplasty before 65F 55M? No   Social History Narrative     Not on file     Social Determinants of Health     Financial Resource Strain: Not on file   Food Insecurity: Not on file   Transportation Needs: Not on file   Physical Activity: Not on file   Stress: Not on file   Social Connections: Not on file   Interpersonal Safety: Not on file   Housing Stability: Not on file     Family History   Problem Relation Age of Onset     Factor IX deficiency Mother      Diabetes Maternal Grandmother      Melanoma No family hx of      Skin Cancer No family hx of        PE:  Alert and Oriented, Answering Questions Appropriately  Atraumatic, Normocephalic  There is scarring of the left face consistent with the adjacent tissue rearrangement and extends from the infraorbital  crease medially and inferiorly to the site of where the nasolabial fold would be. There is a significant volume deficit just inferior to the left malar eminence. There is some tension on the tissue here as well.   Skin: Edmond 1-2  Facial Nerve Intact and facial movement symmetric  EOMI  Nasal Exam: No external Deformity  Lips/Teeth/Toungue/Gums: Lips intact  Neck: Trachea midline  Chest: No wheezing, cyanosis, or stridor  Card: not diaphoretic  Neuro/Psych: CN's 2-12 intact, Moves all extremities, ambulation in intact, positive affect, no notable muscle weakness         IMPRESSION/PLAN: Daily Vyas is a 28 year old female who underwent excision and reconstruction of a left cheek fibrous histiocytoma on March 2022 and is here to discuss reconstructive options. She has a volume deficit present at the left inferior malar eminence along with expected scarring from the adjacent tissue rearrangement. We discussed options to add volume to this area including a customized implant, fat grafting, and dermal fillers. We discussed at length the risks and benefits of each of these options. With an implant, we discussed a separate incision would need to be made intraorally and possibly       ***Risks and benefits were discussed including but not limited to bleeding, hematoma, infection, scarring, asymmetry, irregularities, numbness/tingling (temporary or permanent), damage to motor nerves (temporary or permanent), need for additional procedures.       Photodocumentation was obtained.         Updated photodocumentation obtained.    Rebecca Mathur RN  10/10/2023 10:35 AM        Your trust in our practice and care is much appreciated.    Sincerely,  Kelly Manuel MD

## 2023-10-10 NOTE — PROGRESS NOTES
Facial Plastic and Reconstructive Surgery Consultation  10/10/23     HPI:   I had the pleasure of seeing Daily Vyas today in clinic for consultation for surgical facial rejuvenation. Daily Vyas is a 28 year old female. She was diagnosed with a fibrous histiocytoma of the left cheek and underwent mohs resection and reconstruction with an advancement flap and full thickness graft with Dr. Kevin Ocampo. She has undergone PDL and CO2 laser treatment of the area as well. She presents today for discussion of further reconstruction. She is bothered by the large volume deficit that she now has at the left inferior malar eminence from the tumor removal.         Review Of Systems  ROS: 10 point ROS neg other than the symptoms noted above in the HPI.    Patient Active Problem List   Diagnosis     Alopecia     Dermatitis, seborrheic     Loss of hair     Low iron stores     Past Surgical History:   Procedure Laterality Date     BIOPSY       HEAD & NECK SURGERY       Current Outpatient Medications   Medication Sig Dispense Refill     clobetasol (TEMOVATE) 0.05 % external solution Apply to affected areas of scalp (above ears) once daily. (Patient not taking: Reported on 7/12/2022) 50 mL 3     clobetasol propionate (CLOBEX) 0.05 % external shampoo APPLY TO DRY SCALP, LEAVE ON 10 MINS THEN LATHER & RINSE*DO EVERY OTHER DAY ROTATE W/KETOCONAZOLE 118 mL 0     desogestrel-ethinyl estradiol (JOSS/VELIVET) 0.1/0.125/0.15 -0.025 MG tablet Take 1 tablet by mouth daily 84 tablet 3     Fluocinolone Acetonide Scalp 0.01 % OIL oil Apply 1-2 caps full to scalp for 4 hours, preferably overnight, then shampoo. Apply 1-2 times weekly. 118 mL 1     ibuprofen (ADVIL/MOTRIN) 200 MG tablet Take 200 mg by mouth as needed       ketoconazole (NIZORAL) 2 % external shampoo APPLY & LATHER ONTO DAMP SCALP & LEAVE ON 3-5MIN FOR 3 TIMES WEEKLY 120 mL 11     minoxidil (LONITEN) 2.5 MG tablet Take 1/2 tablet once daily (1.25 mg) 60 tablet 3      spironolactone (ALDACTONE) 50 MG tablet Take 3 tablets (150 mg) by mouth daily 270 tablet 1     triamcinolone (KENALOG) 0.1 % ointment Apply topically 2 times daily To ears for 2-3 weeks, then a few times per week for maintenance. 80 g 3     Fexofenadine  Social History     Socioeconomic History     Marital status: Single     Spouse name: Not on file     Number of children: Not on file     Years of education: Not on file     Highest education level: Not on file   Occupational History     Not on file   Tobacco Use     Smoking status: Never     Smokeless tobacco: Never   Vaping Use     Vaping Use: Never used   Substance and Sexual Activity     Alcohol use: Yes     Comment: Socially     Drug use: Never     Sexual activity: Not Currently     Partners: Male     Birth control/protection: Condom, Pill   Other Topics Concern     Parent/sibling w/ CABG, MI or angioplasty before 65F 55M? No   Social History Narrative     Not on file     Social Determinants of Health     Financial Resource Strain: Not on file   Food Insecurity: Not on file   Transportation Needs: Not on file   Physical Activity: Not on file   Stress: Not on file   Social Connections: Not on file   Interpersonal Safety: Not on file   Housing Stability: Not on file     Family History   Problem Relation Age of Onset     Factor IX deficiency Mother      Diabetes Maternal Grandmother      Melanoma No family hx of      Skin Cancer No family hx of        PE:  Alert and Oriented, Answering Questions Appropriately  Atraumatic, Normocephalic  There is scarring of the left face consistent with the adjacent tissue rearrangement and extends from the infraorbital crease medially and inferiorly to the site of where the nasolabial fold would be. There is a significant volume deficit just inferior to the left malar eminence. There is some tension on the tissue here as well.   Skin: Edmond 1-2  Facial Nerve Intact and facial movement symmetric  EOMI  Nasal Exam: No  external Deformity  Lips/Teeth/Toungue/Gums: Lips intact  Neck: Trachea midline  Chest: No wheezing, cyanosis, or stridor  Card: not diaphoretic  Neuro/Psych: CN's 2-12 intact, Moves all extremities, ambulation in intact, positive affect, no notable muscle weakness         IMPRESSION/PLAN: Daily Vyas is a 28 year old female who underwent excision and reconstruction of a left cheek fibrous histiocytoma on March 2022 and is here to discuss reconstructive options. She has a volume deficit present at the left inferior malar eminence along with expected scarring from the adjacent tissue rearrangement. We discussed options to add volume to this area including a customized implant, fat grafting, and dermal fillers. We discussed at length the risks and benefits of each of these options. With an implant, we discussed a separate incision would need to be made intraorally and possibly overlying the skin in a previous incision site. We discussed the details of fat grafting and that this may take a few operations to get enough volume. We discussed ways to improve the scar including dermabrasion. There is a small standing cutaneous deformity at the very inferior aspect of the incision that I would not address until the volume has been restored to the cheek.     She is going to discuss options with her mom and I am going to call her later this week or next week to further discuss.       Risks and benefits were discussed including but not limited to bleeding, hematoma, infection, scarring, asymmetry, irregularities, numbness/tingling (temporary or permanent), damage to motor nerves (temporary or permanent), need for additional procedures.       Photodocumentation was obtained.

## 2023-10-25 RX ORDER — METRONIDAZOLE 500 MG/1
1 TABLET ORAL
COMMUNITY
Start: 2023-09-13 | End: 2024-02-12

## 2023-11-29 DIAGNOSIS — M95.2 ACQUIRED FACIAL DEFORMITY: Primary | ICD-10-CM

## 2023-12-18 ENCOUNTER — TELEPHONE (OUTPATIENT)
Dept: OTOLARYNGOLOGY | Facility: CLINIC | Age: 28
End: 2023-12-18
Payer: COMMERCIAL

## 2023-12-18 DIAGNOSIS — M95.2 ACQUIRED FACIAL DEFORMITY: Primary | ICD-10-CM

## 2023-12-18 RX ORDER — CEFAZOLIN SODIUM 2 G/50ML
2 SOLUTION INTRAVENOUS SEE ADMIN INSTRUCTIONS
Status: CANCELLED | OUTPATIENT
Start: 2023-12-18

## 2023-12-18 RX ORDER — CEFAZOLIN SODIUM 2 G/50ML
2 SOLUTION INTRAVENOUS
Status: CANCELLED | OUTPATIENT
Start: 2023-12-18

## 2023-12-18 NOTE — TELEPHONE ENCOUNTER
Called patient to schedule surgery with Dr. Manuel. No answer, callback number [phone] left on  for patient.     Sherin Raza on 12/18/2023 at 11:30 AM

## 2023-12-22 NOTE — TELEPHONE ENCOUNTER
Addended by: LEIDA GALLOWAY on: 1/4/2019 04:48 PM     Modules accepted: Orders     Called patient to schedule surgery with Dr. Manuel. No answer, callback number [phone] left on  for patient.     Sherin Raza on 12/22/2023 at 8:28 AM

## 2023-12-22 NOTE — TELEPHONE ENCOUNTER
Patient is scheduled for surgery with Dr. Manuel.     Spoke with: Patient     Date of Surgery: 2/16/24    Location: [facility] OR     Pre op with Provider: KRISTOPHER     H&P: Patient will schedule pre op with Angeline Griffin. Informed patient pre op will need to be done within 30 days of surgery date.     Additional imaging/appointments: Patient will be scheduled for a 1 week post op with Dr. Manuel.     Surgery packet: Per patients preference, will send packet through [employer]. Patient made aware arrival time will not be listed within packet.      Additional comments: Patient made aware a pre op nurse will call 2-5 days prior to surgery to go over further details/give arrival time.         Sherin Raza on 12/22/2023 at 10:16 AM

## 2024-01-08 ENCOUNTER — TELEPHONE (OUTPATIENT)
Dept: OTOLARYNGOLOGY | Facility: CLINIC | Age: 29
End: 2024-01-08
Payer: COMMERCIAL

## 2024-01-16 ENCOUNTER — DOCUMENTATION ONLY (OUTPATIENT)
Dept: PLASTIC SURGERY | Facility: CLINIC | Age: 29
End: 2024-01-16

## 2024-01-23 ENCOUNTER — TELEPHONE (OUTPATIENT)
Dept: PLASTIC SURGERY | Facility: CLINIC | Age: 29
End: 2024-01-23

## 2024-01-26 ENCOUNTER — PREP FOR PROCEDURE (OUTPATIENT)
Dept: OTOLARYNGOLOGY | Facility: CLINIC | Age: 29
End: 2024-01-26
Payer: COMMERCIAL

## 2024-01-29 ENCOUNTER — TELEPHONE (OUTPATIENT)
Dept: OTOLARYNGOLOGY | Facility: CLINIC | Age: 29
End: 2024-01-29
Payer: COMMERCIAL

## 2024-02-05 ENCOUNTER — ANCILLARY PROCEDURE (OUTPATIENT)
Dept: CT IMAGING | Facility: CLINIC | Age: 29
End: 2024-02-05
Attending: STUDENT IN AN ORGANIZED HEALTH CARE EDUCATION/TRAINING PROGRAM
Payer: COMMERCIAL

## 2024-02-05 DIAGNOSIS — M95.2 ACQUIRED FACIAL DEFORMITY: ICD-10-CM

## 2024-02-05 PROCEDURE — 70486 CT MAXILLOFACIAL W/O DYE: CPT | Mod: TC | Performed by: RADIOLOGY

## 2024-02-12 ENCOUNTER — OFFICE VISIT (OUTPATIENT)
Dept: FAMILY MEDICINE | Facility: CLINIC | Age: 29
End: 2024-02-12
Payer: COMMERCIAL

## 2024-02-12 VITALS
HEART RATE: 68 BPM | RESPIRATION RATE: 16 BRPM | HEIGHT: 67 IN | OXYGEN SATURATION: 97 % | WEIGHT: 243 LBS | TEMPERATURE: 97.6 F | BODY MASS INDEX: 38.14 KG/M2 | DIASTOLIC BLOOD PRESSURE: 87 MMHG | SYSTOLIC BLOOD PRESSURE: 133 MMHG

## 2024-02-12 DIAGNOSIS — E66.812 CLASS 2 OBESITY DUE TO EXCESS CALORIES WITHOUT SERIOUS COMORBIDITY WITH BODY MASS INDEX (BMI) OF 38.0 TO 38.9 IN ADULT: ICD-10-CM

## 2024-02-12 DIAGNOSIS — E66.09 CLASS 2 OBESITY DUE TO EXCESS CALORIES WITHOUT SERIOUS COMORBIDITY WITH BODY MASS INDEX (BMI) OF 38.0 TO 38.9 IN ADULT: ICD-10-CM

## 2024-02-12 DIAGNOSIS — Z01.818 PREOP GENERAL PHYSICAL EXAM: Primary | ICD-10-CM

## 2024-02-12 DIAGNOSIS — M95.2 ACQUIRED FACIAL DEFORMITY: ICD-10-CM

## 2024-02-12 DIAGNOSIS — Z13.1 SCREENING FOR DIABETES MELLITUS: ICD-10-CM

## 2024-02-12 LAB
ANION GAP SERPL CALCULATED.3IONS-SCNC: 10 MMOL/L (ref 7–15)
BUN SERPL-MCNC: 13 MG/DL (ref 6–20)
CALCIUM SERPL-MCNC: 9.6 MG/DL (ref 8.6–10)
CHLORIDE SERPL-SCNC: 106 MMOL/L (ref 98–107)
CREAT SERPL-MCNC: 0.73 MG/DL (ref 0.51–0.95)
DEPRECATED HCO3 PLAS-SCNC: 25 MMOL/L (ref 22–29)
EGFRCR SERPLBLD CKD-EPI 2021: >90 ML/MIN/1.73M2
ERYTHROCYTE [DISTWIDTH] IN BLOOD BY AUTOMATED COUNT: 12.8 % (ref 10–15)
GLUCOSE SERPL-MCNC: 90 MG/DL (ref 70–99)
HBA1C MFR BLD: 5 % (ref 0–5.6)
HCT VFR BLD AUTO: 44.6 % (ref 35–47)
HGB BLD-MCNC: 14.5 G/DL (ref 11.7–15.7)
MCH RBC QN AUTO: 29.4 PG (ref 26.5–33)
MCHC RBC AUTO-ENTMCNC: 32.5 G/DL (ref 31.5–36.5)
MCV RBC AUTO: 90 FL (ref 78–100)
PLATELET # BLD AUTO: 359 10E3/UL (ref 150–450)
POTASSIUM SERPL-SCNC: 4.3 MMOL/L (ref 3.4–5.3)
RBC # BLD AUTO: 4.94 10E6/UL (ref 3.8–5.2)
SODIUM SERPL-SCNC: 141 MMOL/L (ref 135–145)
WBC # BLD AUTO: 6.2 10E3/UL (ref 4–11)

## 2024-02-12 PROCEDURE — 85027 COMPLETE CBC AUTOMATED: CPT | Performed by: PHYSICIAN ASSISTANT

## 2024-02-12 PROCEDURE — 83036 HEMOGLOBIN GLYCOSYLATED A1C: CPT | Performed by: PHYSICIAN ASSISTANT

## 2024-02-12 PROCEDURE — 80048 BASIC METABOLIC PNL TOTAL CA: CPT | Performed by: PHYSICIAN ASSISTANT

## 2024-02-12 PROCEDURE — 36415 COLL VENOUS BLD VENIPUNCTURE: CPT | Performed by: PHYSICIAN ASSISTANT

## 2024-02-12 PROCEDURE — 99214 OFFICE O/P EST MOD 30 MIN: CPT | Performed by: PHYSICIAN ASSISTANT

## 2024-02-12 RX ORDER — NORGESTIMATE AND ETHINYL ESTRADIOL 7DAYSX3 LO
1 KIT ORAL DAILY
COMMUNITY
Start: 2023-10-13 | End: 2024-06-18

## 2024-02-12 ASSESSMENT — PAIN SCALES - GENERAL: PAINLEVEL: NO PAIN (1)

## 2024-02-12 NOTE — PATIENT INSTRUCTIONS
Preparing for Your Surgery  Getting started  A nurse will call you to review your health history and instructions. They will give you an arrival time based on your scheduled surgery time. Please be ready to share:  Your doctor's clinic name and phone number  Your medical, surgical, and anesthesia history  A list of allergies and sensitivities  A list of medicines, including herbal treatments and over-the-counter drugs  Whether the patient has a legal guardian (ask how to send us the papers in advance)  Please tell us if you're pregnant--or if there's any chance you might be pregnant. Some surgeries may injure a fetus (unborn baby), so they require a pregnancy test. Surgeries that are safe for a fetus don't always need a test, and you can choose whether to have one.   If you have a child who's having surgery, please ask for a copy of Preparing for Your Child's Surgery.    Preparing for surgery  Within 10 to 30 days of surgery: Have a pre-op exam (sometimes called an H&P, or History and Physical). This can be done at a clinic or pre-operative center.  If you're having a , you may not need this exam. Talk to your care team.  At your pre-op exam, talk to your care team about all medicines you take. If you need to stop any medicines before surgery, ask when to start taking them again.  We do this for your safety. Many medicines can make you bleed too much during surgery. Some change how well surgery (anesthesia) drugs work.  Call your insurance company to let them know you're having surgery. (If you don't have insurance, call [phone].)  Call your clinic if there's any change in your health. This includes signs of a cold or flu (sore throat, runny nose, cough, rash, fever). It also includes a scrape or scratch near the surgery site.  If you have questions on the day of surgery, call your hospital or surgery center.  Eating and drinking guidelines  For your safety: Unless your surgeon tells you otherwise,  follow the guidelines below.  Eat and drink as usual until 8 hours before you arrive for surgery. After that, no food or milk.  Drink clear liquids until 2 hours before you arrive. These are liquids you can see through, like water, Gatorade, and Propel Water. They also include plain black coffee and tea (no cream or milk), candy, and breath mints. You can spit out gum when you arrive.  If you drink alcohol: Stop drinking it the night before surgery.  If your care team tells you to take medicine on the morning of surgery, it's okay to take it with a sip of water.  Preventing infection  Shower or bathe the night before and morning of your surgery. Follow the instructions your clinic gave you. (If no instructions, use regular soap.)  Don't shave or clip hair near your surgery site. We'll remove the hair if needed.  Don't smoke or vape the morning of surgery. You may chew nicotine gum up to 2 hours before surgery. A nicotine patch is okay.  Note: Some surgeries require you to completely quit smoking and nicotine. Check with your surgeon.  Your care team will make every effort to keep you safe from infection. We will:  Clean our hands often with soap and water (or an alcohol-based hand rub).  Clean the skin at your surgery site with a special soap that kills germs.  Give you a special gown to keep you warm. (Cold raises the risk of infection.)  Wear special hair covers, masks, gowns and gloves during surgery.  Give antibiotic medicine, if prescribed. Not all surgeries need antibiotics.  What to bring on the day of surgery  Photo ID and insurance card  Copy of your health care directive, if you have one  Glasses and hearing aids (bring cases)  You can't wear contacts during surgery  Inhaler and eye drops, if you use them (tell us about these when you arrive)  CPAP machine or breathing device, if you use them  A few personal items, if spending the night  If you have . . .  A pacemaker, ICD (cardiac defibrillator) or other  implant: Bring the ID card.  An implanted stimulator: Bring the remote control.  A legal guardian: Bring a copy of the certified (court-stamped) guardianship papers.  Please remove any jewelry, including body piercings. Leave jewelry and other valuables at home.  If you're going home the day of surgery  You must have a responsible adult drive you home. They should stay with you overnight as well.  If you don't have someone to stay with you, and you aren't safe to go home alone, we may keep you overnight. Insurance often won't pay for this.  After surgery  If it's hard to control your pain or you need more pain medicine, please call your surgeon's office.  Questions?   If you have any questions for your care team, list them here: _________________________________________________________________________________________________________________________________________________________________________ ____________________________________ ____________________________________ ____________________________________  For informational purposes only. Not to replace the advice of your health care provider. Copyright   2003, 2019 [facility]. All rights reserved. Clinically reviewed by She Allen MD. [employer] 530853 - REV 12/22.    How to Take Your Medication Before Surgery  - Take all of your medications before surgery as usual

## 2024-02-12 NOTE — PROGRESS NOTES
Preoperative Evaluation  [facility]  [address] [facility] [address]  Phone: [phone]  Primary Provider: Angeline Griffin  Pre-op Performing Provider: DOMINICK KRAMER  Feb 12, 2024     Daily is a 28 year old, presenting for the following:  Pre-Op Exam        2/12/2024    10:08 AM   Additional Questions   Roomed by Marcy   Accompanied by self         2/12/2024    10:08 AM   Patient Reported Additional Medications   Patient reports taking the following new medications n/a     Surgical Information  Surgery/Procedure: facial implant -   Midfacial implant placement to left face     Surgery Location: [facility]  Surgeon: Kelly Manuel MD   Surgery Date: 02/16/2024  Time of Surgery: TDB  Where patient plans to recover: At home with family  Fax number for surgical facility: Note does not need to be faxed, will be available electronically in Epic.    Assessment & Plan     The proposed surgical procedure is considered LOW risk.    Preop general physical exam  As long as labs ok, pt may proceed with proposed procedure as planned.   - Basic metabolic panel  (Ca, Cl, CO2, Creat, Gluc, K, Na, BUN); Future  - CBC with platelets; Future    Screening for diabetes mellitus  - Hemoglobin A1c; Future    Acquired facial deformity    Class 2 obesity due to excess calories without serious comorbidity with body mass index (BMI) of 38.0 to 38.9 in adult       - No identified additional risk factors other than previously addressed    Antiplatelet or Anticoagulation Medication Instructions   - Patient is on no antiplatelet or anticoagulation medications.    Additional Medication Instructions  Patient is to take all scheduled medications on the day of surgery EXCEPT for modifications listed below: stopped supplements and advised not to take OTC pain relievers prior to surgery.     Recommendation  APPROVAL GIVEN to proceed with proposed procedure pending review of diagnostic  evaluation.      Subjective       HPI related to upcoming procedure: 2 years ago had tumor removed from cheek through Mohs surgery (benign) and wants to improve appearance. Chronic issues include alopecia and obesity. Stopped supplements already. Takes no OTC pain meds at this time.         2/6/2024     4:53 PM   Preop Questions   1. Have you ever had a heart attack or stroke? No   2. Have you ever had surgery on your heart or blood vessels, such as a stent placement, a coronary artery bypass, or surgery on an artery in your head, neck, heart, or legs? No   3. Do you have chest pain with activity? No   4. Do you have a history of  heart failure? No   5. Do you currently have a cold, bronchitis or symptoms of other infection? No   6. Do you have a cough, shortness of breath, or wheezing? No   7. Do you or anyone in your family have previous history of blood clots? YES - mom, factor V leiden, pt tested negative   8. Do you or does anyone in your family have a serious bleeding problem such as prolonged bleeding following surgeries or cuts? No   9. Have you ever had problems with anemia or been told to take iron pills? No   10. Have you had any abnormal blood loss such as black, tarry or bloody stools, or abnormal vaginal bleeding? No   11. Have you ever had a blood transfusion? No   12. Are you willing to have a blood transfusion if it is medically needed before, during, or after your surgery? Yes   13. Have you or any of your relatives ever had problems with anesthesia? No   14. Do you have sleep apnea, excessive snoring or daytime drowsiness? No   15. Do you have any artifical heart valves or other implanted medical devices like a pacemaker, defibrillator, or continuous glucose monitor? No   16. Do you have artificial joints? No   17. Are you allergic to latex? No   18. Is there any chance that you may be pregnant? No       Health Care Directive  Patient does not have a Health Care Directive or Living Will: Discussed  advance care planning with patient; information given to patient to review.    Preoperative Review of    reviewed - no record of controlled substances prescribed.      Patient Active Problem List    Diagnosis Date Noted    Low iron stores 01/06/2018     Priority: Medium    Dermatitis, seborrheic 09/30/2017     Priority: Medium    Loss of hair 09/30/2017     Priority: Medium    Alopecia 01/28/2017     Priority: Medium      Past Medical History:   Diagnosis Date    Cancer (H)     Eczema     Nickel allergy     identified by allergy to belt buckle    Uncomplicated asthma      Past Surgical History:   Procedure Laterality Date    BIOPSY      HEAD & NECK SURGERY       Current Outpatient Medications   Medication Sig Dispense Refill    clobetasol propionate (CLOBEX) 0.05 % external shampoo APPLY TO DRY SCALP, LEAVE ON 10 MINS THEN LATHER & RINSE*DO EVERY OTHER DAY ROTATE W/KETOCONAZOLE 118 mL 0    desogestrel-ethinyl estradiol (JOSS/VELIVET) 0.1/0.125/0.15 -0.025 MG tablet Take 1 tablet by mouth daily 84 tablet 3    Fluocinolone Acetonide Scalp 0.01 % OIL oil Apply 1-2 caps full to scalp for 4 hours, preferably overnight, then shampoo. Apply 1-2 times weekly. 118 mL 1    ketoconazole (NIZORAL) 2 % external shampoo APPLY & LATHER ONTO DAMP SCALP & LEAVE ON 3-5MIN FOR 3 TIMES WEEKLY 120 mL 11    minoxidil (LONITEN) 2.5 MG tablet Take 1/2 tablet once daily (1.25 mg) 60 tablet 3    norgestim-eth estrad triphasic (ORTHO TRI-CYCLEN LO) 0.18/0.215/0.25 MG-25 MCG tablet Take 1 tablet by mouth daily      spironolactone (ALDACTONE) 50 MG tablet Take 3 tablets (150 mg) by mouth daily 270 tablet 1    triamcinolone (KENALOG) 0.1 % ointment Apply topically 2 times daily To ears for 2-3 weeks, then a few times per week for maintenance. 80 g 3    clobetasol (TEMOVATE) 0.05 % external solution Apply to affected areas of scalp (above ears) once daily. (Patient not taking: Reported on 7/12/2022) 50 mL 3    ibuprofen (ADVIL/MOTRIN) 200  "MG tablet Take 200 mg by mouth as needed (Patient not taking: Reported on 2/12/2024)      metroNIDAZOLE (FLAGYL) 500 MG tablet Take 1 tablet by mouth 2 times daily         Allergies   Allergen Reactions    Fexofenadine Other (See Comments)     Bloody nose  Other reaction(s): Bloody Nose  Other reaction(s): Bloody Nose        Social History     Tobacco Use    Smoking status: Never    Smokeless tobacco: Never   Substance Use Topics    Alcohol use: Yes     Comment: Socially     Family History   Problem Relation Age of Onset    Factor IX deficiency Mother     Hypertension Mother     Diabetes Maternal Grandmother     Colon Cancer Paternal Grandmother     Melanoma No family hx of     Skin Cancer No family hx of      History   Drug Use Unknown       Review of Systems    Review of Systems  Constitutional, HEENT, cardiovascular, pulmonary, GI, , musculoskeletal, neuro, skin, endocrine and psych systems are negative, except as otherwise noted.    Objective    /87   Pulse 68   Temp 97.6  F (36.4  C) (Tympanic)   Resp 16   Ht 1.702 m (5' 7\")   Wt 110.2 kg (243 lb)   LMP 02/12/2024   SpO2 97%   BMI 38.06 kg/m     Estimated body mass index is 38.06 kg/m  as calculated from the following:    Height as of this encounter: 1.702 m (5' 7\").    Weight as of this encounter: 110.2 kg (243 lb).  Physical Exam  Vitals reviewed.   Constitutional:       Appearance: Normal appearance. She is well-developed.   HENT:      Head: Normocephalic and atraumatic.      Jaw: No trismus.      Right Ear: Tympanic membrane, ear canal and external ear normal.      Left Ear: Tympanic membrane, ear canal and external ear normal.      Nose: Nose normal. No rhinorrhea.      Mouth/Throat:      Mouth: Mucous membranes are moist.      Dentition: Normal dentition.      Tongue: No lesions. Tongue does not deviate from midline.      Pharynx: Oropharynx is clear. Uvula midline.      Tonsils: No tonsillar exudate. 2+ on the right. 2+ on the left. "   Eyes:      General: Lids are normal. No allergic shiner.     Extraocular Movements: Extraocular movements intact.      Conjunctiva/sclera: Conjunctivae normal.   Neck:      Thyroid: No thyroid mass, thyromegaly or thyroid tenderness.      Trachea: Trachea normal.   Cardiovascular:      Rate and Rhythm: Normal rate and regular rhythm.      Pulses:           Posterior tibial pulses are 2+ on the right side and 2+ on the left side.      Heart sounds: Normal heart sounds. No murmur heard.  Pulmonary:      Effort: Pulmonary effort is normal.      Breath sounds: Normal breath sounds.   Abdominal:      General: Abdomen is flat. Bowel sounds are normal.      Palpations: Abdomen is soft.      Tenderness: There is no abdominal tenderness.      Hernia: No hernia is present.   Musculoskeletal:      Cervical back: Normal range of motion.      Right lower leg: No edema.      Left lower leg: No edema.      Comments: Ambulatory without assistive device  Limbs with grossly normal AROM   Lymphadenopathy:      Head:      Right side of head: No submental, submandibular, tonsillar, preauricular or posterior auricular adenopathy.      Left side of head: No submental, submandibular, tonsillar, preauricular or posterior auricular adenopathy.      Cervical: No cervical adenopathy.      Upper Body:      Right upper body: No supraclavicular adenopathy.   Skin:     General: Skin is warm and dry.      Capillary Refill: Capillary refill takes 2 to 3 seconds.      Findings: No lesion, rash or wound.   Neurological:      General: No focal deficit present.      Mental Status: She is alert.      Motor: Motor function is intact.      Coordination: Coordination is intact.      Gait: Gait is intact.      Deep Tendon Reflexes:      Reflex Scores:       Patellar reflexes are 2+ on the right side and 2+ on the left side.     Comments: CN II-XII grossly intact   Psychiatric:         Attention and Perception: Attention normal.         Mood and Affect:  Mood normal.         Behavior: Behavior is cooperative.         Thought Content: Thought content normal.         Recent Labs   Lab Test 05/12/23  1158 11/07/22  1725   HGB 14.6  --      --     137   POTASSIUM 4.8 4.0   CR 0.78 0.62        Diagnostics  Labs pending at this time.  Results will be reviewed when available.   No EKG required for low risk surgery (cataract, skin procedure, breast biopsy, etc).    Revised Cardiac Risk Index (RCRI)  The patient has the following serious cardiovascular risks for perioperative complications:   - No serious cardiac risks = 0 points     RCRI Interpretation: 0 points: Class I (very low risk - 0.4% complication rate)    Signed Electronically by: DOMINICK KRAMER PA-C  Copy of this evaluation report is provided to requesting physician.

## 2024-02-13 ENCOUNTER — OFFICE VISIT (OUTPATIENT)
Dept: PLASTIC SURGERY | Facility: CLINIC | Age: 29
End: 2024-02-13
Payer: COMMERCIAL

## 2024-02-13 DIAGNOSIS — Z98.890 POSTOPERATIVE STATE: ICD-10-CM

## 2024-02-13 DIAGNOSIS — D23.9 FIBROUS HISTIOCYTOMA: Primary | ICD-10-CM

## 2024-02-13 RX ORDER — OXYCODONE HYDROCHLORIDE 5 MG/1
5 TABLET ORAL EVERY 6 HOURS PRN
Qty: 10 TABLET | Refills: 0 | Status: SHIPPED | OUTPATIENT
Start: 2024-02-13 | End: 2024-02-16

## 2024-02-13 RX ORDER — CHLORHEXIDINE GLUCONATE ORAL RINSE 1.2 MG/ML
15 SOLUTION DENTAL 2 TIMES DAILY
Qty: 420 ML | Refills: 3 | Status: SHIPPED | OUTPATIENT
Start: 2024-02-13 | End: 2024-02-27

## 2024-02-13 RX ORDER — ONDANSETRON 4 MG/1
4 TABLET, ORALLY DISINTEGRATING ORAL EVERY 6 HOURS PRN
Qty: 8 TABLET | Refills: 0 | Status: SHIPPED | OUTPATIENT
Start: 2024-02-13 | End: 2024-04-01

## 2024-02-13 NOTE — PROGRESS NOTES
Facial Plastic and Reconstructive Surgery Follow up      HPI:   I had the pleasure of seeing Daily Vyas today in follow up to discuss her upcoming surgery. 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 She now has a volume deficit of the left cheek. We are planning for a custom midface implant this week. We discussed the procedure and her questions at length today.         Review Of Systems  ROS: 10 point ROS neg other than the symptoms noted above in the HPI.    Patient Active Problem List   Diagnosis     Alopecia     Dermatitis, seborrheic     Loss of hair     Low iron stores     Past Surgical History:   Procedure Laterality Date     BIOPSY       HEAD & NECK SURGERY       Current Outpatient Medications   Medication Sig Dispense Refill     clobetasol propionate (CLOBEX) 0.05 % external shampoo APPLY TO DRY SCALP, LEAVE ON 10 MINS THEN LATHER & RINSE*DO EVERY OTHER DAY ROTATE W/KETOCONAZOLE 118 mL 0     Fluocinolone Acetonide Scalp 0.01 % OIL oil Apply 1-2 caps full to scalp for 4 hours, preferably overnight, then shampoo. Apply 1-2 times weekly. 118 mL 1     ketoconazole (NIZORAL) 2 % external shampoo APPLY & LATHER ONTO DAMP SCALP & LEAVE ON 3-5MIN FOR 3 TIMES WEEKLY 120 mL 11     minoxidil (LONITEN) 2.5 MG tablet Take 1/2 tablet once daily (1.25 mg) 60 tablet 3     norgestim-eth estrad triphasic (ORTHO TRI-CYCLEN LO) 0.18/0.215/0.25 MG-25 MCG tablet Take 1 tablet by mouth daily       spironolactone (ALDACTONE) 50 MG tablet Take 3 tablets (150 mg) by mouth daily 270 tablet 1     triamcinolone (KENALOG) 0.1 % ointment Apply topically 2 times daily To ears for 2-3 weeks, then a few times per week for maintenance. 80 g 3     Fexofenadine  Social History     Socioeconomic History     Marital status: Single     Spouse name: Not on file     Number of children: Not on file     Years of education: Not on file     Highest education level: Not on  file   Occupational History     Not on file   Tobacco Use     Smoking status: Never     Smokeless tobacco: Never   Vaping Use     Vaping Use: Never used   Substance and Sexual Activity     Alcohol use: Yes     Comment: Socially     Drug use: Never     Sexual activity: Not Currently     Partners: Male     Birth control/protection: Condom, Pill   Other Topics Concern     Parent/sibling w/ CABG, MI or angioplasty before 65F 55M? No   Social History Narrative     Not on file     Social Determinants of Health     Financial Resource Strain: Not on file   Food Insecurity: Not on file   Transportation Needs: Not on file   Physical Activity: Not on file   Stress: Not on file   Social Connections: Not on file   Interpersonal Safety: Low Risk  (2/12/2024)    Interpersonal Safety      Do you feel physically and emotionally safe where you currently live?: Yes      Within the past 12 months, have you been hit, slapped, kicked or otherwise physically hurt by someone?: No      Within the past 12 months, have you been humiliated or emotionally abused in other ways by your partner or ex-partner?: No   Housing Stability: Not on file     Family History   Problem Relation Age of Onset     Factor IX deficiency Mother      Hypertension Mother      Diabetes Maternal Grandmother      Colon Cancer Paternal Grandmother      Melanoma No family hx of      Skin Cancer No family hx of        PE:  Alert and Oriented, Answering Questions Appropriately  Atraumatic, Normocephalic, Face Symmetric  Skin: Edmond 2  Left facial scarring and volume deficit consistent with prior surgery.   EOMI  Nasal Exam: No external Deformity  Chin: Normal   Lips/Teeth/Toungue/Gums: Lips intact  Neck: Trachea midline  Chest: No wheezing, cyanosis, or stridor  Card: not diaphoretic  Neuro/Psych: CN's 2-12 intact, Moves all extremities, ambulation in intact, positive affect, no notable muscle weakness         IMPRESSION/PLAN: Daily Vyas is a 28 year old  female. Plan is to undergo placement of a left custom midface implant this Friday. We discussed the procedure at length today. Risks and benefits were discussed including but not limited to bleeding, hematoma, infection, scarring, asymmetry, irregularities, numbness/tingling (temporary or permanent), damage to motor nerves (temporary or permanent), need for additional procedures.       Photodocumentation was obtained.     I spent a total of 40 minutes in the care of patient Daily Vyas during today's office visit. This time includes reviewing the patient's chart and prior history, obtaining a history, performing an examination and evaluation and counseling the patient. This time also includes ordering mediations or tests necessary in addition to communication to other member's of the patient's health care team. Time spent in documentation and care coordination is included.

## 2024-02-15 ENCOUNTER — ANESTHESIA EVENT (OUTPATIENT)
Dept: SURGERY | Facility: CLINIC | Age: 29
End: 2024-02-15
Payer: COMMERCIAL

## 2024-02-16 ENCOUNTER — ANESTHESIA (OUTPATIENT)
Dept: SURGERY | Facility: CLINIC | Age: 29
End: 2024-02-16
Payer: COMMERCIAL

## 2024-02-16 ENCOUNTER — HOSPITAL ENCOUNTER (OUTPATIENT)
Facility: CLINIC | Age: 29
Discharge: HOME | End: 2024-02-16
Attending: STUDENT IN AN ORGANIZED HEALTH CARE EDUCATION/TRAINING PROGRAM | Admitting: STUDENT IN AN ORGANIZED HEALTH CARE EDUCATION/TRAINING PROGRAM
Payer: COMMERCIAL

## 2024-02-16 VITALS
DIASTOLIC BLOOD PRESSURE: 81 MMHG | SYSTOLIC BLOOD PRESSURE: 133 MMHG | HEIGHT: 67 IN | OXYGEN SATURATION: 95 % | TEMPERATURE: 98 F | WEIGHT: 244.93 LBS | BODY MASS INDEX: 38.44 KG/M2 | RESPIRATION RATE: 16 BRPM | HEART RATE: 79 BPM

## 2024-02-16 PROCEDURE — 250N000011 HC RX IP 250 OP 636: Performed by: NURSE ANESTHETIST, CERTIFIED REGISTERED

## 2024-02-16 PROCEDURE — 258N000003 HC RX IP 258 OP 636

## 2024-02-16 PROCEDURE — 250N000009 HC RX 250: Performed by: NURSE ANESTHETIST, CERTIFIED REGISTERED

## 2024-02-16 PROCEDURE — 710N000010 HC RECOVERY PHASE 1, LEVEL 2, PER MIN: Performed by: STUDENT IN AN ORGANIZED HEALTH CARE EDUCATION/TRAINING PROGRAM

## 2024-02-16 PROCEDURE — 250N000012 HC RX MED GY IP 250 OP 636 PS 637

## 2024-02-16 PROCEDURE — C1713 ANCHOR/SCREW BN/BN,TIS/BN: HCPCS | Performed by: STUDENT IN AN ORGANIZED HEALTH CARE EDUCATION/TRAINING PROGRAM

## 2024-02-16 PROCEDURE — 272N000001 HC OR GENERAL SUPPLY STERILE: Performed by: STUDENT IN AN ORGANIZED HEALTH CARE EDUCATION/TRAINING PROGRAM

## 2024-02-16 PROCEDURE — 21356 OPN TX DPRSD ZYGOMATIC ARCH: CPT | Performed by: NURSE ANESTHETIST, CERTIFIED REGISTERED

## 2024-02-16 PROCEDURE — 360N000076 HC SURGERY LEVEL 3, PER MIN: Performed by: STUDENT IN AN ORGANIZED HEALTH CARE EDUCATION/TRAINING PROGRAM

## 2024-02-16 PROCEDURE — 250N000009 HC RX 250: Performed by: STUDENT IN AN ORGANIZED HEALTH CARE EDUCATION/TRAINING PROGRAM

## 2024-02-16 PROCEDURE — 250N000011 HC RX IP 250 OP 636: Performed by: STUDENT IN AN ORGANIZED HEALTH CARE EDUCATION/TRAINING PROGRAM

## 2024-02-16 PROCEDURE — 250N000013 HC RX MED GY IP 250 OP 250 PS 637

## 2024-02-16 PROCEDURE — 250N000013 HC RX MED GY IP 250 OP 250 PS 637: Performed by: ANESTHESIOLOGY

## 2024-02-16 PROCEDURE — 21356 OPN TX DPRSD ZYGOMATIC ARCH: CPT | Performed by: STUDENT IN AN ORGANIZED HEALTH CARE EDUCATION/TRAINING PROGRAM

## 2024-02-16 PROCEDURE — 250N000011 HC RX IP 250 OP 636

## 2024-02-16 PROCEDURE — 999N000141 HC STATISTIC PRE-PROCEDURE NURSING ASSESSMENT: Performed by: STUDENT IN AN ORGANIZED HEALTH CARE EDUCATION/TRAINING PROGRAM

## 2024-02-16 PROCEDURE — 710N000012 HC RECOVERY PHASE 2, PER MINUTE: Performed by: STUDENT IN AN ORGANIZED HEALTH CARE EDUCATION/TRAINING PROGRAM

## 2024-02-16 PROCEDURE — 250N000025 HC SEVOFLURANE, PER MIN: Performed by: STUDENT IN AN ORGANIZED HEALTH CARE EDUCATION/TRAINING PROGRAM

## 2024-02-16 PROCEDURE — 370N000017 HC ANESTHESIA TECHNICAL FEE, PER MIN: Performed by: STUDENT IN AN ORGANIZED HEALTH CARE EDUCATION/TRAINING PROGRAM

## 2024-02-16 RX ORDER — FENTANYL CITRATE 50 UG/ML
50 INJECTION, SOLUTION INTRAMUSCULAR; INTRAVENOUS EVERY 5 MIN PRN
Status: DISCONTINUED | OUTPATIENT
Start: 2024-02-16 | End: 2024-02-16 | Stop reason: HOSPADM

## 2024-02-16 RX ORDER — ONDANSETRON 2 MG/ML
4 INJECTION INTRAMUSCULAR; INTRAVENOUS EVERY 30 MIN PRN
Status: DISCONTINUED | OUTPATIENT
Start: 2024-02-16 | End: 2024-02-16 | Stop reason: HOSPADM

## 2024-02-16 RX ORDER — OXYCODONE HYDROCHLORIDE 10 MG/1
10 TABLET ORAL
Status: CANCELLED | OUTPATIENT
Start: 2024-02-16

## 2024-02-16 RX ORDER — ONDANSETRON 2 MG/ML
4 INJECTION INTRAMUSCULAR; INTRAVENOUS EVERY 30 MIN PRN
Status: CANCELLED | OUTPATIENT
Start: 2024-02-16

## 2024-02-16 RX ORDER — LIDOCAINE 40 MG/G
CREAM TOPICAL
Status: DISCONTINUED | OUTPATIENT
Start: 2024-02-16 | End: 2024-02-16 | Stop reason: HOSPADM

## 2024-02-16 RX ORDER — SODIUM CHLORIDE, SODIUM LACTATE, POTASSIUM CHLORIDE, CALCIUM CHLORIDE 600; 310; 30; 20 MG/100ML; MG/100ML; MG/100ML; MG/100ML
INJECTION, SOLUTION INTRAVENOUS CONTINUOUS
Status: DISCONTINUED | OUTPATIENT
Start: 2024-02-16 | End: 2024-02-16 | Stop reason: HOSPADM

## 2024-02-16 RX ORDER — ACETAMINOPHEN 325 MG/1
975 TABLET ORAL ONCE
Status: COMPLETED | OUTPATIENT
Start: 2024-02-16 | End: 2024-02-16

## 2024-02-16 RX ORDER — ONDANSETRON 4 MG/1
4 TABLET, ORALLY DISINTEGRATING ORAL EVERY 30 MIN PRN
Status: CANCELLED | OUTPATIENT
Start: 2024-02-16

## 2024-02-16 RX ORDER — HYDROMORPHONE HCL IN WATER/PF 6 MG/30 ML
0.4 PATIENT CONTROLLED ANALGESIA SYRINGE INTRAVENOUS EVERY 5 MIN PRN
Status: DISCONTINUED | OUTPATIENT
Start: 2024-02-16 | End: 2024-02-16 | Stop reason: HOSPADM

## 2024-02-16 RX ORDER — FENTANYL CITRATE 50 UG/ML
25 INJECTION, SOLUTION INTRAMUSCULAR; INTRAVENOUS EVERY 5 MIN PRN
Status: DISCONTINUED | OUTPATIENT
Start: 2024-02-16 | End: 2024-02-16 | Stop reason: HOSPADM

## 2024-02-16 RX ORDER — OXYCODONE HYDROCHLORIDE 5 MG/1
5 TABLET ORAL
Status: CANCELLED | OUTPATIENT
Start: 2024-02-16

## 2024-02-16 RX ORDER — LIDOCAINE HYDROCHLORIDE AND EPINEPHRINE 10; 10 MG/ML; UG/ML
INJECTION, SOLUTION INFILTRATION; PERINEURAL PRN
Status: DISCONTINUED | OUTPATIENT
Start: 2024-02-16 | End: 2024-02-16 | Stop reason: HOSPADM

## 2024-02-16 RX ORDER — CEFAZOLIN SODIUM/WATER 2 G/20 ML
2 SYRINGE (ML) INTRAVENOUS SEE ADMIN INSTRUCTIONS
Status: DISCONTINUED | OUTPATIENT
Start: 2024-02-16 | End: 2024-02-16 | Stop reason: HOSPADM

## 2024-02-16 RX ORDER — CEFAZOLIN SODIUM/WATER 2 G/20 ML
2 SYRINGE (ML) INTRAVENOUS
Status: COMPLETED | OUTPATIENT
Start: 2024-02-16 | End: 2024-02-16

## 2024-02-16 RX ORDER — APREPITANT 40 MG/1
40 CAPSULE ORAL ONCE
Status: COMPLETED | OUTPATIENT
Start: 2024-02-16 | End: 2024-02-16

## 2024-02-16 RX ORDER — DEXAMETHASONE SODIUM PHOSPHATE 4 MG/ML
INJECTION, SOLUTION INTRA-ARTICULAR; INTRALESIONAL; INTRAMUSCULAR; INTRAVENOUS; SOFT TISSUE PRN
Status: DISCONTINUED | OUTPATIENT
Start: 2024-02-16 | End: 2024-02-16

## 2024-02-16 RX ORDER — ACETAMINOPHEN 325 MG/1
975 TABLET ORAL ONCE
Status: DISCONTINUED | OUTPATIENT
Start: 2024-02-16 | End: 2024-02-16

## 2024-02-16 RX ORDER — LIDOCAINE HYDROCHLORIDE 20 MG/ML
INJECTION, SOLUTION INFILTRATION; PERINEURAL PRN
Status: DISCONTINUED | OUTPATIENT
Start: 2024-02-16 | End: 2024-02-16

## 2024-02-16 RX ORDER — PROPOFOL 10 MG/ML
INJECTION, EMULSION INTRAVENOUS PRN
Status: DISCONTINUED | OUTPATIENT
Start: 2024-02-16 | End: 2024-02-16

## 2024-02-16 RX ORDER — HYDROMORPHONE HCL IN WATER/PF 6 MG/30 ML
0.2 PATIENT CONTROLLED ANALGESIA SYRINGE INTRAVENOUS EVERY 5 MIN PRN
Status: DISCONTINUED | OUTPATIENT
Start: 2024-02-16 | End: 2024-02-16 | Stop reason: HOSPADM

## 2024-02-16 RX ORDER — FENTANYL CITRATE 50 UG/ML
INJECTION, SOLUTION INTRAMUSCULAR; INTRAVENOUS PRN
Status: DISCONTINUED | OUTPATIENT
Start: 2024-02-16 | End: 2024-02-16

## 2024-02-16 RX ORDER — ONDANSETRON 4 MG/1
4 TABLET, ORALLY DISINTEGRATING ORAL EVERY 30 MIN PRN
Status: DISCONTINUED | OUTPATIENT
Start: 2024-02-16 | End: 2024-02-16 | Stop reason: HOSPADM

## 2024-02-16 RX ORDER — ONDANSETRON 2 MG/ML
INJECTION INTRAMUSCULAR; INTRAVENOUS PRN
Status: DISCONTINUED | OUTPATIENT
Start: 2024-02-16 | End: 2024-02-16

## 2024-02-16 RX ADMIN — DEXAMETHASONE SODIUM PHOSPHATE 10 MG: 4 INJECTION, SOLUTION INTRA-ARTICULAR; INTRALESIONAL; INTRAMUSCULAR; INTRAVENOUS; SOFT TISSUE at 15:02

## 2024-02-16 RX ADMIN — Medication 2 G: at 14:17

## 2024-02-16 RX ADMIN — FENTANYL CITRATE 50 MCG: 50 INJECTION INTRAMUSCULAR; INTRAVENOUS at 15:01

## 2024-02-16 RX ADMIN — ACETAMINOPHEN 975 MG: 325 TABLET, FILM COATED ORAL at 12:26

## 2024-02-16 RX ADMIN — MIDAZOLAM 2 MG: 1 INJECTION INTRAMUSCULAR; INTRAVENOUS at 14:17

## 2024-02-16 RX ADMIN — FENTANYL CITRATE 50 MCG: 50 INJECTION, SOLUTION INTRAMUSCULAR; INTRAVENOUS at 16:23

## 2024-02-16 RX ADMIN — PROPOFOL 200 MG: 10 INJECTION, EMULSION INTRAVENOUS at 14:34

## 2024-02-16 RX ADMIN — LIDOCAINE HYDROCHLORIDE 100 MG: 20 INJECTION, SOLUTION INFILTRATION; PERINEURAL at 14:34

## 2024-02-16 RX ADMIN — Medication 50 MG: at 14:35

## 2024-02-16 RX ADMIN — FENTANYL CITRATE 50 MCG: 50 INJECTION INTRAMUSCULAR; INTRAVENOUS at 14:52

## 2024-02-16 RX ADMIN — HYDROMORPHONE HYDROCHLORIDE 0.2 MG: 0.2 INJECTION, SOLUTION INTRAMUSCULAR; INTRAVENOUS; SUBCUTANEOUS at 17:00

## 2024-02-16 RX ADMIN — ACETAMINOPHEN 975 MG: 325 TABLET, FILM COATED ORAL at 18:03

## 2024-02-16 RX ADMIN — Medication 10 MG: at 15:32

## 2024-02-16 RX ADMIN — HYDROMORPHONE HYDROCHLORIDE 0.2 MG: 0.2 INJECTION, SOLUTION INTRAMUSCULAR; INTRAVENOUS; SUBCUTANEOUS at 17:13

## 2024-02-16 RX ADMIN — SUGAMMADEX 200 MG: 100 INJECTION, SOLUTION INTRAVENOUS at 15:56

## 2024-02-16 RX ADMIN — APREPITANT 40 MG: 40 CAPSULE ORAL at 12:26

## 2024-02-16 RX ADMIN — FENTANYL CITRATE 25 MCG: 50 INJECTION, SOLUTION INTRAMUSCULAR; INTRAVENOUS at 16:44

## 2024-02-16 RX ADMIN — FENTANYL CITRATE 50 MCG: 50 INJECTION INTRAMUSCULAR; INTRAVENOUS at 14:34

## 2024-02-16 RX ADMIN — ONDANSETRON 4 MG: 2 INJECTION INTRAMUSCULAR; INTRAVENOUS at 15:39

## 2024-02-16 RX ADMIN — ONDANSETRON 4 MG: 2 INJECTION INTRAMUSCULAR; INTRAVENOUS at 16:58

## 2024-02-16 RX ADMIN — FENTANYL CITRATE 25 MCG: 50 INJECTION, SOLUTION INTRAMUSCULAR; INTRAVENOUS at 16:32

## 2024-02-16 RX ADMIN — Medication 20 MG: at 14:46

## 2024-02-16 RX ADMIN — HYDROMORPHONE HYDROCHLORIDE 0.5 MG: 1 INJECTION, SOLUTION INTRAMUSCULAR; INTRAVENOUS; SUBCUTANEOUS at 15:16

## 2024-02-16 RX ADMIN — HYDROMORPHONE HYDROCHLORIDE 0.2 MG: 0.2 INJECTION, SOLUTION INTRAMUSCULAR; INTRAVENOUS; SUBCUTANEOUS at 17:40

## 2024-02-16 RX ADMIN — FENTANYL CITRATE 50 MCG: 50 INJECTION INTRAMUSCULAR; INTRAVENOUS at 14:17

## 2024-02-16 RX ADMIN — SODIUM CHLORIDE, POTASSIUM CHLORIDE, SODIUM LACTATE AND CALCIUM CHLORIDE: 600; 310; 30; 20 INJECTION, SOLUTION INTRAVENOUS at 14:17

## 2024-02-16 RX ADMIN — HYDROMORPHONE HYDROCHLORIDE 0.5 MG: 1 INJECTION, SOLUTION INTRAMUSCULAR; INTRAVENOUS; SUBCUTANEOUS at 16:03

## 2024-02-16 ASSESSMENT — ACTIVITIES OF DAILY LIVING (ADL)
ADLS_ACUITY_SCORE: 27
ADLS_ACUITY_SCORE: 29

## 2024-02-16 NOTE — ANESTHESIA PREPROCEDURE EVALUATION
Anesthesia Pre-Procedure Evaluation    Patient: Daily Vyas   MRN: [id number] : 1995        Procedure : Procedure(s):  Midfacial implant placement to left face          Past Medical History:   Diagnosis Date    Alopecia     Cancer (H)     Eczema     Nickel allergy     identified by allergy to belt buckle    Obese     Uncomplicated asthma       Past Surgical History:   Procedure Laterality Date    BIOPSY      HEAD & NECK SURGERY        Allergies   Allergen Reactions    Fexofenadine Other (See Comments)     Bloody nose  Other reaction(s): Bloody Nose  Other reaction(s): Bloody Nose      Social History     Tobacco Use    Smoking status: Never    Smokeless tobacco: Never   Substance Use Topics    Alcohol use: Yes     Comment: Socially      Wt Readings from Last 1 Encounters:   24 110.2 kg (243 lb)        Anesthesia Evaluation   Pt has had prior anesthetic. Type: General.        ROS/MED HX  ENT/Pulmonary:     (+)                     Intermittent, asthma  Treatment: Inhaler prn,                 Neurologic:  - neg neurologic ROS     Cardiovascular:  - neg cardiovascular ROS     METS/Exercise Tolerance:     Hematologic:  - neg hematologic  ROS     Musculoskeletal:  - neg musculoskeletal ROS     GI/Hepatic:  - neg GI/hepatic ROS     Renal/Genitourinary:  - neg Renal ROS     Endo:     (+)               Obesity,       Psychiatric/Substance Use:  - neg psychiatric ROS     Infectious Disease:       Malignancy:       Other:            Physical Exam    Airway        Mallampati: II   TM distance: > 3 FB   Neck ROM: full   Mouth opening: > 3 cm    Respiratory Devices and Support         Dental           Cardiovascular          Rhythm and rate: regular and normal     Pulmonary           breath sounds clear to auscultation           OUTSIDE LABS:  CBC:   Lab Results   Component Value Date    WBC 6.2 2024    WBC 7.0 2023    HGB 14.5 2024    HGB 14.6 2023    HCT 44.6 2024    HCT 43.6  "05/12/2023     02/12/2024     05/12/2023     BMP:   Lab Results   Component Value Date     02/12/2024     05/12/2023    POTASSIUM 4.3 02/12/2024    POTASSIUM 4.8 05/12/2023    CHLORIDE 106 02/12/2024    CHLORIDE 106 05/12/2023    CO2 25 02/12/2024    CO2 26 05/12/2023    BUN 13.0 02/12/2024    BUN 10.6 05/12/2023    CR 0.73 02/12/2024    CR 0.78 05/12/2023    GLC 90 02/12/2024    GLC 91 05/12/2023     COAGS: No results found for: \"PTT\", \"INR\", \"FIBR\"  POC: No results found for: \"BGM\", \"HCG\", \"HCGS\"  HEPATIC:   Lab Results   Component Value Date    ALBUMIN 4.6 05/12/2023    PROTTOTAL 7.5 05/12/2023    ALT 16 05/12/2023    AST 18 05/12/2023    ALKPHOS 99 05/12/2023    BILITOTAL 0.3 05/12/2023     OTHER:   Lab Results   Component Value Date    A1C 5.0 02/12/2024    VIPIN 9.6 02/12/2024    TSH 2.81 11/07/2022       Anesthesia Plan    ASA Status:  2    NPO Status:  NPO Appropriate    Anesthesia Type: General.     - Airway: ETT   Induction: Intravenous, Propofol.   Maintenance: Balanced.        Consents    Anesthesia Plan(s) and associated risks, benefits, and realistic alternatives discussed. Questions answered and patient/representative(s) expressed understanding.     - Discussed:     - Discussed with:  Patient      - Extended Intubation/Ventilatory Support Discussed: No.      - Patient is DNR/DNI Status: No     Use of blood products discussed: No .     Postoperative Care    Pain management: IV analgesics, Oral pain medications, Multi-modal analgesia.   PONV prophylaxis: Ondansetron (or other 5HT-3), Dexamethasone or Solumedrol, Aprepitant     Comments:               Shawn Arceo MD    I have reviewed the pertinent notes and labs in the chart from the past 30 days and (re)examined the patient.  Any updates or changes from those notes are reflected in this note.              # Obesity: Estimated body mass index is 38.06 kg/m  as calculated from the following:    Height as of 2/12/24: 1.702 m " "(5' 7\").    Weight as of 2/12/24: 110.2 kg (243 lb).      "

## 2024-02-16 NOTE — ANESTHESIA CARE TRANSFER NOTE
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    Anesthesia Type:   General     Note:    Oropharynx: oropharynx clear of all foreign objects and spontaneously breathing  Level of Consciousness: awake  Oxygen Supplementation: face mask  Level of Supplemental Oxygen (L/min / FiO2): 6  Independent Airway: airway patency satisfactory and stable  Dentition: dentition unchanged  Vital Signs Stable: post-procedure vital signs reviewed and stable  Report to RN Given: handoff report given  Patient transferred to: PACU    Handoff Report: Identifed the Patient, Identified the Reponsible Provider, Reviewed the pertinent medical history, Discussed the surgical course, Reviewed Intra-OP anesthesia mangement and issues during anesthesia, Set expectations for post-procedure period and Allowed opportunity for questions and acknowledgement of understanding      Vitals:  Vitals Value Taken Time   /67 02/16/24 1607   Temp     Pulse 99 02/16/24 1608   Resp 14 02/16/24 1608   SpO2 98 % 02/16/24 1608   Vitals shown include unfiled device data.    Electronically Signed By: CRYSTAL Swann CRNA  February 16, 2024  4:10 PM

## 2024-02-16 NOTE — ANESTHESIA PROCEDURE NOTES
Airway       Patient location during procedure: OR       Procedure Start/Stop Times: 2/16/2024 2:38 PM  Staff -        CRNA: Richie Daniels APRN CRNA       Performed By: CRNA  Consent for Airway        Urgency: elective  Indications and Patient Condition       Indications for airway management: ryann-procedural and airway protection       Induction type:intravenous       Mask difficulty assessment: 2 - vent by mask + OA or adjuvant +/- NMBA    Final Airway Details       Final airway type: endotracheal airway       Successful airway: ETT - single  Endotracheal Airway Details        ETT size (mm): 7.0       Cuffed: yes       Successful intubation technique: direct laryngoscopy       DL Blade Type: Mendiola 2       Grade View of Cords: 1       Adjucts: stylet       Position: Right       Secured at (cm): 22       Bite block used: None    Post intubation assessment        Placement verified by: capnometry, equal breath sounds and chest rise        Number of attempts at approach: 1       Number of other approaches attempted: 0       Secured with: tape       Ease of procedure: easy       Dentition: Intact and Unchanged    Medication(s) Administered   Medication Administration Time: 2/16/2024 2:38 PM

## 2024-02-16 NOTE — BRIEF OP NOTE
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  Complications: None.  Implants:   Implant Name Type Inv. Item Serial No.  Lot No. LRB No. Used Action   [employer] Priority Customized Midface Recon   NA  [id number] Left 1 Implanted   [id number]- 1.7 x 5 mm self drilling screw   NA  NA Left 1 Wasted   [id number]- 1.7 x 6mm self drilling screw   NA  NA Left 3 Implanted

## 2024-02-16 NOTE — DISCHARGE INSTRUCTIONS
Contacting your Doctor -   To contact a doctor, call Dr Manuel's clinic at [phone] or:  [phone] and ask for the resident on call for ENT-Otolaryngology (answered 24 hours a day)   Emergency Department:  [facility]: [phone]  [facility]: [phone] 911 if you are in need of immediate or emergent help

## 2024-02-16 NOTE — OP NOTE
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 Placement of custom midfacial implant to left face     Surgeon: Dr. Kelly Manuel MD    Assistant:  Nina Guthrie                                  Anesthesia: General     Complications: None    Dressing: None    Drains: None    Specimen/Pathology: None     EBL: 10 cc    Indications: The patient presented with a left facial deformity following excision of a benign fibrous histiocytoma of the left face. We discussed risks, benefits, and alternatives and we opted to go forward with the above mentioned procedure.     Intraoperative findings:   -Custom implant placed and secured with 3 self tapping screws.     Details of the Procedure: After informed consent was obtained, the patient was brought from the preoperative holding area to the operating room and placed supine on the operating table. The planned incisions were marked appropriately and infiltrated with 1% lidocaine with 1:100,000 epinephrine. The patient was then sterilely prepped and draped in the standard surgical fashion.      A left vestibular incision was made with bovie cautery and taken down to the underlying bone. Blunt dissection was then taken in a subperiosteal plane medially to the pyriform aperture, superiorly to the infraorbital rim, and laterally to the zygoma. The infraorbital nerve was identified and preserved. The custom implant was then placed and secured to the underlying bone with 3 self tapping screws. The incision was then closed in a layered fashion using 4-0 vicryl suture deep and 4-0 chromic on the mucosa. The incision was then irrigated and suctioned. This concluded our procedure.     The patient's care was turned over to the anesthesia team who successfully awakened the patient without  complication. All instrument, sharp and lap counts were correct at the end of the case. The patient was transported to the recovery area in stable condition.    I was present and scrubbed for the entire procedure    Kelly Manuel MD

## 2024-02-17 NOTE — ANESTHESIA POSTPROCEDURE EVALUATION
Addended by: Brenda Coles on: 7/14/2017 11:53 AM     Modules accepted: Orders Patient: Daily Vyas    Procedure: Procedure(s):  Midfacial implant placement to left face       Anesthesia Type:  General    Note:  Disposition: Outpatient   Postop Pain Control: Uneventful            Sign Out: Well controlled pain   PONV: No   Neuro/Psych: Uneventful            Sign Out: Acceptable/Baseline neuro status   Airway/Respiratory: Uneventful            Sign Out: Acceptable/Baseline resp. status   CV/Hemodynamics: Uneventful            Sign Out: Acceptable CV status; No obvious hypovolemia; No obvious fluid overload   Other NRE: NONE   DID A NON-ROUTINE EVENT OCCUR? No           Last vitals:  Vitals Value Taken Time   /65 02/16/24 1911   Temp 37.1  C (98.8  F) 02/16/24 1607   Pulse 83 02/16/24 1911   Resp 22 02/16/24 1858   SpO2 96 % 02/16/24 1913   Vitals shown include unfiled device data.    Electronically Signed By: Patel Del Rio MD  February 16, 2024  7:14 PM

## 2024-02-18 DIAGNOSIS — Z98.890 POSTOPERATIVE STATE: Primary | ICD-10-CM

## 2024-02-18 RX ORDER — OXYCODONE HYDROCHLORIDE 5 MG/1
5 TABLET ORAL EVERY 6 HOURS PRN
Qty: 12 TABLET | Refills: 0 | Status: SHIPPED | OUTPATIENT
Start: 2024-02-18 | End: 2024-04-01

## 2024-02-23 ENCOUNTER — OFFICE VISIT (OUTPATIENT)
Dept: PLASTIC SURGERY | Facility: CLINIC | Age: 29
End: 2024-02-23
Payer: COMMERCIAL

## 2024-02-23 DIAGNOSIS — Z98.890 POSTOPERATIVE STATE: Primary | ICD-10-CM

## 2024-02-23 NOTE — PROGRESS NOTES
Saw Daily one week s/p midfacial implant placement to left face (2/16/24).    She reports she is doing very well. She has expected swelling and very minimal bruising. Her incision is healing appropriately. We discussed continued cares and realistic healing expectations, not limited to two week activity restrictions. Pt verbalized understanding of this. All questions answered.    Pt instructed to reach out if questions or concerns arise.    Follow-up appt made.    Rebecca Mathur RN  2/23/2024 4:03 PM

## 2024-03-19 ENCOUNTER — OFFICE VISIT (OUTPATIENT)
Dept: DERMATOLOGY | Facility: CLINIC | Age: 29
End: 2024-03-19
Payer: COMMERCIAL

## 2024-03-19 ENCOUNTER — OFFICE VISIT (OUTPATIENT)
Dept: PLASTIC SURGERY | Facility: CLINIC | Age: 29
End: 2024-03-19
Payer: COMMERCIAL

## 2024-03-19 VITALS — DIASTOLIC BLOOD PRESSURE: 78 MMHG | HEART RATE: 95 BPM | SYSTOLIC BLOOD PRESSURE: 121 MMHG

## 2024-03-19 DIAGNOSIS — L30.9 DERMATITIS: ICD-10-CM

## 2024-03-19 DIAGNOSIS — Z98.890 POSTOPERATIVE STATE: Primary | ICD-10-CM

## 2024-03-19 DIAGNOSIS — L65.9 LOSS OF HAIR: ICD-10-CM

## 2024-03-19 DIAGNOSIS — L21.9 DERMATITIS, SEBORRHEIC: ICD-10-CM

## 2024-03-19 PROCEDURE — 99214 OFFICE O/P EST MOD 30 MIN: CPT | Mod: GC | Performed by: DERMATOLOGY

## 2024-03-19 RX ORDER — CLOBETASOL PROPIONATE 0.05 G/100ML
SHAMPOO TOPICAL
Qty: 118 ML | Refills: 0 | Status: SHIPPED | OUTPATIENT
Start: 2024-03-19 | End: 2024-03-19

## 2024-03-19 RX ORDER — SPIRONOLACTONE 50 MG/1
150 TABLET, FILM COATED ORAL DAILY
Qty: 270 TABLET | Refills: 1 | Status: SHIPPED | OUTPATIENT
Start: 2024-03-19

## 2024-03-19 RX ORDER — MINOXIDIL 2.5 MG/1
TABLET ORAL
Qty: 60 TABLET | Refills: 3 | Status: SHIPPED | OUTPATIENT
Start: 2024-03-19

## 2024-03-19 RX ORDER — CLOBETASOL PROPIONATE 0.05 G/100ML
SHAMPOO TOPICAL
Qty: 118 ML | Refills: 3 | Status: SHIPPED | OUTPATIENT
Start: 2024-03-19 | End: 2024-04-01

## 2024-03-19 RX ORDER — KETOCONAZOLE 20 MG/ML
SHAMPOO TOPICAL
Qty: 120 ML | Refills: 11 | Status: SHIPPED | OUTPATIENT
Start: 2024-03-19

## 2024-03-19 ASSESSMENT — PAIN SCALES - GENERAL: PAINLEVEL: NO PAIN (0)

## 2024-03-19 NOTE — NURSING NOTE
Chief Complaint   Patient presents with    Hair Loss     Patient reports some improvement since their last visit. The patient denies new concerns at this time.      Che Gomez LPN

## 2024-03-19 NOTE — PROGRESS NOTES
[facility] Dermatology Note  Encounter Date: Mar 19, 2024  Office Visit     Dermatology Problem List:  1. Non-scarring alopecia, favor androgenetic alopecia (previous ddx psoriatic alopecia vs AA)  - Scalp biopsy 1/2017 most consistent with psoriatic alopecia vs. alopecia areata, initially did not have any evidence of psoriasis elsewhere on body but rash on bilateral ears noted 5/7/18    - ILK 10 mg/cc 4/2017 and 5/2017 without much improvement.  - Patient stopped clobetasol 0.05% shampoo due to high cost  - Current Tx: oral minoxidil 1.25 mg daily, ketoconazole 2% shampoo, laser comb TIW, spironolactone 150 mg daily, fluocinolone oil 1-2x weekly  - Hair metrix: 11/1/21, 5/9/22, 11/7/22, 5/12/23  2. Seborrheic Dermatitis  - Current Tx: derma-smoothe/fs oil 1-2x weekly for the winter months  3. History of dermatitis, behind ears  - s/p 0.1% Triamcinolone ointment (when needed)  4. History of low iron stores  - WNL 9/3/2020 and 11/2021  5. 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 Milia, right medial cheek, s/p extraction 3/31/2023  7. Junctional dysplastic nevus with moderate atypia, right anterior thigh, s/p shave bx 3/31/2023      Assessment & Plan:     # Non-scarring alopecia presently favoring female pattern hair loss: previously ddx w/ psoriatic alopecia vs AA. Overall improved compared to last visit.   - Continue spironolactone 150 mg daily  - Continue minoxidil 1.25 mg daily  - Ok to discontinue topical minoxidil  - Continue ketoconazole 2% shampoo three times per week  - Restart clobetasol shampoo 1-2x  weekly  - Continue with laser comb 3x weekly  - Ok to stop derma-smoothe/FS oil         Procedures Performed:   None    Follow-up: 6 month(s) in-person, or earlier for new or changing lesions    Staff and Resident:    I, Omid Young MD, discussed and evaluated the patient with Dr. Huston.    Patient was seen and examined with the dermatology resident. I agree with the  history, review of systems, physical examination, assessments and plan.    Velvet Huston MD  Professor and  Chair  Department of Dermatology  [facility]           ____________________________________________    CC: Hair Loss (Patient reports some improvement since their last visit. The patient denies new concerns at this time. )    HPI:  Ms. Daily Vyas is a 28 year old female who presents as a return patient for follow-up of hair loss, diagnosed as non scarring alopecia. Last seen in derm clinic 5/2023 - was started on oral minoxidil 1.25mg daily. Today reports she is tolerating this well. Is also applying minoxidil foam 5% nightly. Washes her hair daily to every other day with ketoconazole shampoo 2-3x per week. She uses fluocinolone scalp oil during winter months once every 2-3 weeks. Denies any scalp itch or burning. Notices some diffuse shedding but less shedding overall compared to when she was seen last year. She also uses LLLT every 10 days. She had a facial reconstruction surgery 2/16/24.   Overall course: stable, maybe improved    Patient is otherwise feeling well, in usual state of health, and has no additional skin concerns today.     Labs:  CBC , CMP , Iron studies  and TSH reviewed.    Physical Exam:  Vitals: /78 (BP Location: Right arm, Patient Position: Sitting)   Pulse 95   LMP 02/08/2024   GEN: Well developed, well-nourished, in no acute distress, in a pleasant mood.    SKIN: Focused examination of scalp, face and hands was performed.  - Hammad part width of 1.2, 1.1   - Patchy erythema on scalp  - Vaughn 2  - The layers of hair regrowth layers were noted to be  - 1-2 cm for the first  - 3-4 cm at the second  - 8-9 cm at the third   - 12-14 cm at the fourth   - mild diffuse erythema   - No perifollicular erythema  - No perifollicular scale   - mild scaling of the scalp   -  negative hair pull test   - normal eyelash density  - normal eyebrow density  - no nail  pitting or dystrophy   - no scalp folliculitis/pustules   - In comparison to prior photographs, improved, particularly in hair part  - No other lesions of concern on areas examined.                         Medications:  Current Outpatient Medications   Medication    clobetasol propionate (CLOBEX) 0.05 % external shampoo    Fluocinolone Acetonide Scalp 0.01 % OIL oil    ketoconazole (NIZORAL) 2 % external shampoo    minoxidil (LONITEN) 2.5 MG tablet    norgestim-eth estrad triphasic (ORTHO TRI-CYCLEN LO) 0.18/0.215/0.25 MG-25 MCG tablet    spironolactone (ALDACTONE) 50 MG tablet    triamcinolone (KENALOG) 0.1 % ointment    ondansetron (ZOFRAN ODT) 4 MG ODT tab    oxyCODONE (ROXICODONE) 5 MG tablet     No current facility-administered medications for this visit.      Past Medical History:   Patient Active Problem List   Diagnosis    Alopecia    Dermatitis, seborrheic    Loss of hair    Low iron stores     Past Medical History:   Diagnosis Date    Alopecia     Cancer (H)     Eczema     Nickel allergy     identified by allergy to belt buckle    Obese     Uncomplicated asthma        CC Neena Massey MD  No address on file on close of this encounter.

## 2024-03-19 NOTE — PROGRESS NOTES
Facial Plastic and Reconstructive Surgery      Daily Vyas is here in follow up. She is s/p LEFT midfacial implant on 2/16/24. She is about 1 month out.     She reports she is happy with how things are going. It still feels odd to her. Still has some tenderness. She continues to have significant fullness at the inferior aspect of her reconstructive incision and prominence to the superior aspect of the incision.     On exam, she is healing great. Her intraoral incision is well approximated and looks good.     A/P: Daily Vyas is a 28 year old female who has a history of an excision of a fibrous histiocytoma of the left cheek s/p excision and reconstruction. We placed a custom midfacial implant on the left due to the residual defect and volume loss from the tumor excision and reconstruction. She is looking great today and healing well. Our next stage will plan to be excision of the inferior incisional prominence and dermabrasion to the superior incision line. We could do this in the office. Risks and benefits were discussed including but not limited to bleeding, infection, numbness, damage to motor nerve (permanent or temporary weakness), hematoma, need for additional procedure.     Kelly Manuel MD

## 2024-03-19 NOTE — LETTER
3/19/2024       RE: Daily Vyas  [address] [address]     Dear Colleague,    Thank you for referring your patient, Daily Vyas, to the [facility] DERMATOLOGY [facility]IC [place] at [facility]. Please see a copy of my visit note below.    [facility] Dermatology Note  Encounter Date: Mar 19, 2024  Office Visit     Dermatology Problem List:  1. Non-scarring alopecia, favor androgenetic alopecia (previous ddx psoriatic alopecia vs AA)  - Scalp biopsy 1/2017 most consistent with psoriatic alopecia vs. alopecia areata, initially did not have any evidence of psoriasis elsewhere on body but rash on bilateral ears noted 5/7/18    - ILK 10 mg/cc 4/2017 and 5/2017 without much improvement.  - Patient stopped clobetasol 0.05% shampoo due to high cost  - Current Tx: oral minoxidil 1.25 mg daily, ketoconazole 2% shampoo, laser comb TIW, spironolactone 150 mg daily, fluocinolone oil 1-2x weekly  - Hair metrix: 11/1/21, 5/9/22, 11/7/22, 5/12/23  2. Seborrheic Dermatitis  - Current Tx: derma-smoothe/fs oil 1-2x weekly for the winter months  3. History of dermatitis, behind ears  - s/p 0.1% Triamcinolone ointment (when needed)  4. History of low iron stores  - WNL 9/3/2020 and 11/2021  5. 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 Milia, right medial cheek, s/p extraction 3/31/2023  7. Junctional dysplastic nevus with moderate atypia, right anterior thigh, s/p shave bx 3/31/2023      Assessment & Plan:     # Non-scarring alopecia presently favoring female pattern hair loss: previously ddx w/ psoriatic alopecia vs AA. Overall improved compared to last visit.   - Continue spironolactone 150 mg daily  - Continue minoxidil 1.25 mg daily  - Ok to discontinue topical minoxidil  - Continue ketoconazole 2% shampoo three times per week  - Restart clobetasol shampoo 1-2x  weekly  - Continue with laser comb 3x weekly  - Ok to stop  derma-smoothe/FS oil         Procedures Performed:   None    Follow-up: 6 month(s) in-person, or earlier for new or changing lesions    Staff and Resident:    I, Omid Young MD, discussed and evaluated the patient with Dr. Huston.    Patient was seen and examined with the dermatology resident. I agree with the history, review of systems, physical examination, assessments and plan.    Velvet Huston MD  Professor and  Chair  Department of Dermatology  [facility]           ____________________________________________    CC: Hair Loss (Patient reports some improvement since their last visit. The patient denies new concerns at this time. )    HPI:  Ms. Daily Vyas is a 28 year old female who presents as a return patient for follow-up of hair loss, diagnosed as non scarring alopecia. Last seen in derm clinic 5/2023 - was started on oral minoxidil 1.25mg daily. Today reports she is tolerating this well. Is also applying minoxidil foam 5% nightly. Washes her hair daily to every other day with ketoconazole shampoo 2-3x per week. She uses fluocinolone scalp oil during winter months once every 2-3 weeks. Denies any scalp itch or burning. Notices some diffuse shedding but less shedding overall compared to when she was seen last year. She also uses LLLT every 10 days. She had a facial reconstruction surgery 2/16/24.   Overall course: stable, maybe improved    Patient is otherwise feeling well, in usual state of health, and has no additional skin concerns today.     Labs:  CBC , CMP , Iron studies  and TSH reviewed.    Physical Exam:  Vitals: /78 (BP Location: Right arm, Patient Position: Sitting)   Pulse 95   LMP 02/08/2024   GEN: Well developed, well-nourished, in no acute distress, in a pleasant mood.    SKIN: Focused examination of scalp, face and hands was performed.  - Hammad part width of 1.2, 1.1   - Patchy erythema on scalp  - Vaughn 2  - The layers of hair regrowth layers were  noted to be  - 1-2 cm for the first  - 3-4 cm at the second  - 8-9 cm at the third   - 12-14 cm at the fourth   - mild diffuse erythema   - No perifollicular erythema  - No perifollicular scale   - mild scaling of the scalp   -  negative hair pull test   - normal eyelash density  - normal eyebrow density  - no nail pitting or dystrophy   - no scalp folliculitis/pustules   - In comparison to prior photographs, improved, particularly in hair part  - No other lesions of concern on areas examined.                         Medications:  Current Outpatient Medications   Medication     clobetasol propionate (CLOBEX) 0.05 % external shampoo     Fluocinolone Acetonide Scalp 0.01 % OIL oil     ketoconazole (NIZORAL) 2 % external shampoo     minoxidil (LONITEN) 2.5 MG tablet     norgestim-eth estrad triphasic (ORTHO TRI-CYCLEN LO) 0.18/0.215/0.25 MG-25 MCG tablet     spironolactone (ALDACTONE) 50 MG tablet     triamcinolone (KENALOG) 0.1 % ointment     ondansetron (ZOFRAN ODT) 4 MG ODT tab     oxyCODONE (ROXICODONE) 5 MG tablet     No current facility-administered medications for this visit.      Past Medical History:   Patient Active Problem List   Diagnosis     Alopecia     Dermatitis, seborrheic     Loss of hair     Low iron stores     Past Medical History:   Diagnosis Date     Alopecia      Cancer (H)      Eczema      Nickel allergy     identified by allergy to belt buckle     Obese      Uncomplicated asthma        CC Neena Massey MD  No address on file on close of this encounter.      Again, thank you for allowing me to participate in the care of your patient.      Sincerely,    Velvet Huston MD

## 2024-03-21 ENCOUNTER — TELEPHONE (OUTPATIENT)
Dept: DERMATOLOGY | Facility: CLINIC | Age: 29
End: 2024-03-21
Payer: COMMERCIAL

## 2024-03-21 NOTE — TELEPHONE ENCOUNTER
MTM referral from: [facility] visit (referral by provider)    MTM referral outreach attempt #2 on March 21, 2024 at 11:21 AM      Outcome: Patient not reachable after several attempts, will route to MTM Pharmacist/Provider as an FYI.  MT scheduling number is .  Thank you for the referral.    Use hbc for the carrier/Plan on the flowsheet      [employer] Message Sent    Thais Dobbins CPhT  MTM      Patient called back and scheduled visit.

## 2024-03-22 NOTE — PROGRESS NOTES
Msg sent to PA team to determine coverage for in-office procedure of scar revision & dermabrasion to facial scar (1 hour). We will be in-touch with pt to schedule once this is determined.    Rebecca Mathur RN  3/22/2024 11:54 AM

## 2024-03-26 ENCOUNTER — MYC MEDICAL ADVICE (OUTPATIENT)
Dept: PLASTIC SURGERY | Facility: CLINIC | Age: 29
End: 2024-03-26

## 2024-03-26 DIAGNOSIS — B99.9 INFECTION: Primary | ICD-10-CM

## 2024-03-27 NOTE — TELEPHONE ENCOUNTER
Spoke with pt about concerns she is having. Swelling appears to be getting better after starting the antibiotic, educated her we would expect this to continue resolving. She was having some itching of her eye that has been eased with lubricating eye drops. She will be reaching out tomorrow to let me know how she is doing. If things do not continue to resolve we will have her come into clinic to be examined by Dr. Manuel.    Rebecca Mathur RN  3/27/2024 9:07 AM

## 2024-03-29 ENCOUNTER — OFFICE VISIT (OUTPATIENT)
Dept: DERMATOLOGY | Facility: CLINIC | Age: 29
End: 2024-03-29
Payer: COMMERCIAL

## 2024-03-29 DIAGNOSIS — D18.01 CHERRY ANGIOMA: ICD-10-CM

## 2024-03-29 DIAGNOSIS — D22.9 MULTIPLE BENIGN NEVI: Primary | ICD-10-CM

## 2024-03-29 DIAGNOSIS — L81.4 LENTIGINES: ICD-10-CM

## 2024-03-29 PROCEDURE — 99213 OFFICE O/P EST LOW 20 MIN: CPT | Mod: GC | Performed by: DERMATOLOGY

## 2024-03-29 ASSESSMENT — PAIN SCALES - GENERAL: PAINLEVEL: NO PAIN (0)

## 2024-03-29 NOTE — PROGRESS NOTES
[facility] Dermatology Note  Encounter Date: Mar 29, 2024  Office Visit     Dermatology Problem List:  1. Non-scarring alopecia, favor androgenetic alopecia (previous ddx psoriatic alopecia vs AA)  - Scalp biopsy 1/2017 most consistent with psoriatic alopecia vs. alopecia areata, initially did not have any evidence of psoriasis elsewhere on body but rash on bilateral ears noted 5/7/18    - ILK 10 mg/cc 4/2017 and 5/2017 without much improvement.  - Patient stopped clobetasol 0.05% shampoo due to high cost  - Current Tx: oral minoxidil 1.25 mg daily, ketoconazole 2% shampoo, laser comb TIW, spironolactone 150 mg daily, fluocinolone oil 1-2x weekly  - Hair metrix: 11/1/21, 5/9/22, 11/7/22, 5/12/23  2. Seborrheic Dermatitis  - Current Tx: derma-smoothe/fs oil 1-2x weekly for the winter months  3. History of dermatitis, behind ears  - s/p 0.1% Triamcinolone ointment (when needed)  4. History of low iron stores  - WNL 9/3/2020 and 11/2021  5. 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 Milia, right medial cheek, s/p extraction 3/31/2023  7. Junctional dysplastic nevus with moderate atypia, right anterior thigh, s/p shave bx 3/31/2023    ____________________________________________    Assessment & Plan:      # History of fibrous histiocytoma, L cheek, s/p MMS 3/2022  - Now s/p cheek implant with facial plastics Feb 2024.  - Started amoxicillin 4 days ago out of concern for possible post-op infection. Improving in that regard, swelling and discomfort are improving. Today just mild swelling.  - Encouraged to complete course of amoxicillin. Reach out to surgery team if not improving.      # Benign lesions - Cherry angiomas, lentigenes.  - No treatment required      # Multiple benign nevi --> global photos 3/31/2023 were reviewed for comparison, no appreciable changes.   - Monitor for ABCDEs of melanoma   - Continue sun protection - recommend SPF 30 or higher with frequent  application   - Return sooner if noticing changing or symptomatic lesions       Procedures Performed:   None    Follow-up: 12mo for skin check.     Staff, Resident, and Scribe:     Resident:  I saw and discussed the patient with the attending physician, Dr. Marin.     Romaine Maciel MD, PhD  PGY-3 Dermatology Resident       Staff Physician Comments:   I saw and evaluated the patient with the resident and I agree with the assessment and plan.  I was present for the examination.    Tamar Marin MD    Department of Dermatology  [facility]: Phone: [phone], Fax: [phone]  4/3/2024     ____________________________________________    CC: Skin Check (Patient reports no new lesions of concern. The patient would like a FBSE.)    HPI:  Ms. Daily Vyas is a(n) 28 year old female who presents today as a return patient for FBSE.    - Had a cheek implant placed 6 weeks ago for reconstruction after her Mohs surgery for fibrous histiocytoma on the L cheek (initial surgery March 2022). Recently, noticed some increased swelling, concern for possible infection; started amoxicillin; would like a double check of the area today.   - Hair/scalp doing well, last visit with Dr. Huston was 3/19/24 and things are stable.   - No other skin concerns today.       Patient is otherwise feeling well, without additional skin concerns.    Labs Reviewed:  N/A    Physical Exam:  Vitals: LMP 02/08/2024   SKIN: Total skin excluding the undergarment areas was performed. The exam included the head/face, neck, both arms, chest, back, abdomen, both legs, digits and/or nails.   - Well-healed surgical site on the L cheek, mild peripheral faint red erythema and subtle edema.   - There are sparse dome shaped bright red papules on the trunk and extremities.   - Scattered brown macules on sun exposed areas.  - Scattered light to dark brown  macules and papules on the trunk and extremities without concerning dermatoscopic features.   - No other lesions of concern on areas examined.       Medications:  Current Outpatient Medications   Medication Sig Dispense Refill    amoxicillin-clavulanate (AUGMENTIN) 875-125 MG tablet Take 1 tablet by mouth 2 times daily for 10 days 20 tablet 0    Fluocinolone Acetonide Scalp 0.01 % OIL oil Apply 1-2 caps full to scalp for 4 hours, preferably overnight, then shampoo. Apply 1-2 times weekly. 118 mL 1    ketoconazole (NIZORAL) 2 % external shampoo APPLY & LATHER ONTO DAMP SCALP & LEAVE ON 3-5MIN FOR 3 TIMES WEEKLY 120 mL 11    minoxidil (LONITEN) 2.5 MG tablet Take 1/2 tablet once daily (1.25 mg) 60 tablet 3    norgestim-eth estrad triphasic (ORTHO TRI-CYCLEN LO) 0.18/0.215/0.25 MG-25 MCG tablet Take 1 tablet by mouth daily      spironolactone (ALDACTONE) 50 MG tablet Take 3 tablets (150 mg) by mouth daily 270 tablet 1    triamcinolone (KENALOG) 0.1 % ointment Apply topically 2 times daily To ears for 2-3 weeks, then a few times per week for maintenance. 80 g 3    clobetasol propionate (CLOBEX) 0.05 % external shampoo APPLY TO DRY SCALP, LEAVE ON 10 MINS THEN LATHER & RINSE. APPLY 1-2 TIMES WEEKLY. 118 mL 3     No current facility-administered medications for this visit.      Past Medical History:   Patient Active Problem List   Diagnosis    Alopecia    Dermatitis, seborrheic    Loss of hair    Low iron stores     Past Medical History:   Diagnosis Date    Alopecia     Cancer (H)     Eczema     Nickel allergy     identified by allergy to belt buckle    Obese     Uncomplicated asthma         CC Kevin Ocampo MD  [address] [place] [address] on close of this encounter.

## 2024-03-29 NOTE — LETTER
3/29/2024       RE: Daily Vyas  [address] [facility] [address]     Dear Colleague,    Thank you for referring your patient, Daily Vyas, to the [facility] DERMATOLOGY [facility]INIC [place] at [facility]. Please see a copy of my visit note below.    [facility] Dermatology Note  Encounter Date: Mar 29, 2024  Office Visit     Dermatology Problem List:  1. Non-scarring alopecia, favor androgenetic alopecia (previous ddx psoriatic alopecia vs AA)  - Scalp biopsy 1/2017 most consistent with psoriatic alopecia vs. alopecia areata, initially did not have any evidence of psoriasis elsewhere on body but rash on bilateral ears noted 5/7/18    - ILK 10 mg/cc 4/2017 and 5/2017 without much improvement.  - Patient stopped clobetasol 0.05% shampoo due to high cost  - Current Tx: oral minoxidil 1.25 mg daily, ketoconazole 2% shampoo, laser comb TIW, spironolactone 150 mg daily, fluocinolone oil 1-2x weekly  - Hair metrix: 11/1/21, 5/9/22, 11/7/22, 5/12/23  2. Seborrheic Dermatitis  - Current Tx: derma-smoothe/fs oil 1-2x weekly for the winter months  3. History of dermatitis, behind ears  - s/p 0.1% Triamcinolone ointment (when needed)  4. History of low iron stores  - WNL 9/3/2020 and 11/2021  5. 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 Milia, right medial cheek, s/p extraction 3/31/2023  7. Junctional dysplastic nevus with moderate atypia, right anterior thigh, s/p shave bx 3/31/2023    ____________________________________________    Assessment & Plan:      # History of fibrous histiocytoma, L cheek, s/p MMS 3/2022  - Now s/p cheek implant with facial plastics Feb 2024.  - Started amoxicillin 4 days ago out of concern for possible post-op infection. Improving in that regard, swelling and discomfort are improving. Today just mild swelling.  - Encouraged to complete course of amoxicillin. Reach out to  surgery team if not improving.      # Benign lesions - Cherry angiomas, lentigenes.  - No treatment required      # Multiple benign nevi --> global photos 3/31/2023 were reviewed for comparison, no appreciable changes.   - Monitor for ABCDEs of melanoma   - Continue sun protection - recommend SPF 30 or higher with frequent application   - Return sooner if noticing changing or symptomatic lesions       Procedures Performed:   None    Follow-up: 12mo for skin check.     Staff, Resident, and Scribe:     Resident:  I saw and discussed the patient with the attending physician, Dr. Marin.     Romaine Maciel MD, PhD  PGY-3 Dermatology Resident       Staff Physician Comments:   I saw and evaluated the patient with the resident and I agree with the assessment and plan.  I was present for the examination.    Tamar Marin MD    Department of Dermatology  [facility]: Phone: [phone], Fax: [phone]  4/3/2024     ____________________________________________    CC: Skin Check (Patient reports no new lesions of concern. The patient would like a FBSE.)    HPI:  Ms. Daily Vyas is a(n) 28 year old female who presents today as a return patient for FBSE.    - Had a cheek implant placed 6 weeks ago for reconstruction after her Mohs surgery for fibrous histiocytoma on the L cheek (initial surgery March 2022). Recently, noticed some increased swelling, concern for possible infection; started amoxicillin; would like a double check of the area today.   - Hair/scalp doing well, last visit with Dr. Huston was 3/19/24 and things are stable.   - No other skin concerns today.       Patient is otherwise feeling well, without additional skin concerns.    Labs Reviewed:  N/A    Physical Exam:  Vitals: LMP 02/08/2024   SKIN: Total skin excluding the undergarment areas was performed. The exam included the head/face, neck, both  arms, chest, back, abdomen, both legs, digits and/or nails.   - Well-healed surgical site on the L cheek, mild peripheral faint red erythema and subtle edema.   - There are sparse dome shaped bright red papules on the trunk and extremities.   - Scattered brown macules on sun exposed areas.  - Scattered light to dark brown macules and papules on the trunk and extremities without concerning dermatoscopic features.   - No other lesions of concern on areas examined.       Medications:  Current Outpatient Medications   Medication Sig Dispense Refill    amoxicillin-clavulanate (AUGMENTIN) 875-125 MG tablet Take 1 tablet by mouth 2 times daily for 10 days 20 tablet 0    Fluocinolone Acetonide Scalp 0.01 % OIL oil Apply 1-2 caps full to scalp for 4 hours, preferably overnight, then shampoo. Apply 1-2 times weekly. 118 mL 1    ketoconazole (NIZORAL) 2 % external shampoo APPLY & LATHER ONTO DAMP SCALP & LEAVE ON 3-5MIN FOR 3 TIMES WEEKLY 120 mL 11    minoxidil (LONITEN) 2.5 MG tablet Take 1/2 tablet once daily (1.25 mg) 60 tablet 3    norgestim-eth estrad triphasic (ORTHO TRI-CYCLEN LO) 0.18/0.215/0.25 MG-25 MCG tablet Take 1 tablet by mouth daily      spironolactone (ALDACTONE) 50 MG tablet Take 3 tablets (150 mg) by mouth daily 270 tablet 1    triamcinolone (KENALOG) 0.1 % ointment Apply topically 2 times daily To ears for 2-3 weeks, then a few times per week for maintenance. 80 g 3    clobetasol propionate (CLOBEX) 0.05 % external shampoo APPLY TO DRY SCALP, LEAVE ON 10 MINS THEN LATHER & RINSE. APPLY 1-2 TIMES WEEKLY. 118 mL 3     No current facility-administered medications for this visit.      Past Medical History:   Patient Active Problem List   Diagnosis    Alopecia    Dermatitis, seborrheic    Loss of hair    Low iron stores     Past Medical History:   Diagnosis Date    Alopecia     Cancer (H)     Eczema     Nickel allergy     identified by allergy to belt buckle    Obese     Uncomplicated asthma         CC Kevin  MD Damaris  [address] [place] [address] on close of this encounter.

## 2024-03-29 NOTE — NURSING NOTE
Chief Complaint   Patient presents with    Skin Check     Patient reports no new lesions of concern. The patient would like a FBSE.     Che Gomez LPN

## 2024-04-01 ENCOUNTER — TELEPHONE (OUTPATIENT)
Dept: DERMATOLOGY | Facility: CLINIC | Age: 29
End: 2024-04-01
Payer: COMMERCIAL

## 2024-04-01 ENCOUNTER — VIRTUAL VISIT (OUTPATIENT)
Dept: RHEUMATOLOGY | Facility: CLINIC | Age: 29
End: 2024-04-01
Attending: DERMATOLOGY
Payer: COMMERCIAL

## 2024-04-01 DIAGNOSIS — L30.9 DERMATITIS: ICD-10-CM

## 2024-04-01 DIAGNOSIS — L65.9 ALOPECIA: Primary | ICD-10-CM

## 2024-04-01 RX ORDER — CLOBETASOL PROPIONATE 0.05 G/100ML
SHAMPOO TOPICAL
Qty: 118 ML | Refills: 3 | Status: SHIPPED | OUTPATIENT
Start: 2024-04-01

## 2024-04-01 NOTE — TELEPHONE ENCOUNTER
.Prior Authorization Retail Medication Request    Medication/Dose: Clobetasol propionate 0.05% shampoo   New PA Request:     Insurance   Primary: CONNIE gonzalez [place]   Insurance ID:  [id number]     Please route back to Rina Oliveira RPh if an appeal is needed or with any updates on the outcome of PA.

## 2024-04-01 NOTE — PATIENT INSTRUCTIONS
"Recommendations from today's MTM visit:                                                      Resent prescription for Clobetasol shampoo to [facility] with [employer] savings card. This should lower cost to ~$33   Instructions: Apply to dry scalp, leave on 10 mins then lather & rinse. Apply 1-2 times weekly.     I sent a request to our central PA team asking them to initiate a prior authorization for coverage of clobetasol shampoo to your insurance. I'll update you on the outcome of this in the next few weeks.     Follow-up: via [employer] message with updates on coverage of clobetasol shampoo and via [employer] message as needed with questions or concerns on medications in future     It was great speaking with you today.  I value your experience and would be very thankful for your time in providing feedback in our clinic survey. In the next few days, you may receive an email or text message from [employer] with a link to a survey related to your  clinical pharmacist.\"     To schedule another MTM appointment, please call the clinic directly or you may call the MTM scheduling line at [phone] or toll-free at [phone].     My Clinical Pharmacist's contact information:                                                      Please feel free to contact me with any questions or concerns you have.      Rina Oliveira, PharmD  MTM Pharmacist  [facility] Dermatology   "

## 2024-04-01 NOTE — LETTER
April 17, 2024    ATTN: Appeals & Grievances    Re: Prior Authorization Request    Plan: [facility] Patient: Daily Vyas   Member ID:  [id number]  YOB: 1995                                                                                                                       To whom it may concern:     I am writing this letter of medical necessity as a dermatologist caring for Daily Vyas with regard to the medical necessity of Clobetasol propionate 0.05% shampoo to treat the patient's diagnosis of Seborrheic dermatitis of the scalp (L21.0). I recently prescribed this patient clobetasol shampoo, which required a prior authorization. Unfortunately, the prior authorization was denied and the patient was unable to fill their prescription. I have reviewed the patient's diagnosis, care plan and clinical guidelines for treatment and request a formal appeal of your denial for Clobetasol propionate 0.05% shampoo.      The plan's denial letter indicates the clobetasol shampoo was denied as this is not FDA approved for the treatment of dermatitis. However, topical corticosteroids are the mainstay of therapy for mild to moderate dermatitis. Daily has tried numerous alternative topical therapies with an inadequate response including Fluocinolone 0.01% oil, triamcinolone 0.1% ointment, and ketoconazole 2% shampoo. More specifically, Clobetasol propionate 0.05% shampoo was recommended for the treatment of this patient's dermatitis given the involvement on her scalp & the shampoo formulations ease of use for treating the scalp as it one of two topical corticosteroids available in a shampoo formulation. Dialy has also used clobetasol shampoo in the past for the treatment of her seborrheic dermatitis with a positive clinical response. Furthermore, creams, ointments, lotions, and solution formulations are difficult to apply & administer to the scalp which has been an additional  barrier to treatment.      Seborrheic dermatitis of the scalp (L21.0), a form of eczema, is a chronic inflammatory disease with characterized by rashes often covering much of the body, and can include intense, persistent itching and skin dryness, cracking, redness, crusting, and oozing. Itch is one of the most burdensome symptoms for patients and can be debilitating. In addition, people with dermatitis experience impaired quality of life, including disrupted sleep, and increased anxiety and depression symptoms along with their disease. When treating a patient with dermatitis of the scalp, it is necessary to have access to the full spectrum of accepted treatments as patients may not be able to use one particular treatment due to lack of response, the potential for side effects or even an allergic reaction.     I strongly believe this patient needs access to Clobetasol propionate 0.05% shampoo. Numerous studies have demonstrated the safety and efficacy of Clobetasol propionate 0.05% shampoo in the treatment of scalp dermatitis. Vinh et al. showed that clobetasol shampoo when applied topically for 5 to 10 minutes to scalp dermatitis resulted in statistically significant primary & secondary outcomes in the treatment group including a decrease in total severity score and reduced pruritus & erythema scores.    Additionally, I request that you review the following evidence showing how this medication can be effectively utilized to treat Seborrheic dermatitis of the scalp (L21.0):   Dominique BARRERA, et al. Clobetasol propionate shampoo 0.05% in the treatment of seborrheic dermatitis of the scalp: results of a  study. Cutis. 2007 May;79(5):397-403. PMID: 57399982.  Ashu LOWE, et al. Efficacious and safe management of moderate to severe scalp seborrhoeic dermatitis using clobetasol propionate shampoo 0 05% combined with ketoconazole shampoo 2%: a randomized, controlled study. Br J Dermatol. 2011 Jul;165(1):171-6. PMID:  54023541.    On behalf of Daily Vyas, I would appreciate your prompt reconsideration of this denial. Please feel free to contact me below for any additional information you may require. I look forward to receiving your response and approval of coverage for this medication.     Sincerely,     Velvet Huston MD  [facility] [address] [address]  Phone: [phone]  Fax: [phone]

## 2024-04-01 NOTE — PROGRESS NOTES
Medication Therapy Management (MTM) Encounter    ASSESSMENT:                            Medication Adherence/Access:   Clobetasol shampoo coverage - Needs PA initiated.Provided information for savings cards to help lower cost at this time & incase insurance denies.     Non-scarring alopecia/Seborrheic dermatitis:    Continues on current regimen with oral minoxidil, spironolactone & ketoconazole shampoo. Plans to incorporate clobetasol shampoo 1-2 times weekly if affordable. Discussed cost options & reviewed instructions for use today.     PLAN:                            Resent prescription for Clobetasol shampoo to [facility] with [employer] savings card.   Instructions: Apply to dry scalp, leave on 10 mins then lather & rinse. Apply 1-2 times weekly.   Will route request to central PA team to initiate a new PA for clobetasol shampoo & update patient on the status of this     Follow-up: via [employer] message with updates on coverage of clobetasol shampoo and via [employer] message as needed with questions or concerns on medications in future     SUBJECTIVE/OBJECTIVE:                          Daily Vyas is a 28 year old female called for an initial visit. She was referred to me from Dr. Velvet Huston MD.      Reason for visit: Clobetasol shampoo coverage assistance.    Medication Adherence/Access:   Reports clobetasol was ~$100 the last time she tried to fill so she stopped. [employer] indicates insurance is not covering.     Non-scarring alopecia /Seborrheic dermatitis:    - Minoxidil 1.25 mg tablet daily    - Spironolactone 150 mg daily  - Clobetasol 0.05% shampoo 1-2 times weekly (not currently using)   - Ketoconazole 2% shampoo 3 times weekly  - Laser comb 3 x weekly   No current side effects.     Patient reports she was previously on the clobetasol shampoo in the past but then stopped a couple of years ago due to a change in her insurance & this no longer being covered. Would like to start the clobetasol shampoo  again if affordable. Notes she has not been using the fluocinolone oil - struggles with adhering due to oil formulation but uses during seasonal flares for dry scalp.     Specialist: Dr. Velvet Huston MD, Dermatology. Last visit on 3/19/24. The following was recommended:   - Continue spironolactone, minoxidil 1.25 mg daily, ketoconazole 2% shampoo  - Ok to discontinue topical minoxidil & fluocinolone oil   - Restart clobetasol shampoo 1-2x  weekly    Previous treatment:   - Fluocinolone 0.01% oil as needed (has not used for a 3-4 weeks, substitute with clobex)     Allergies/ADRs: Reviewed in chart  Past Medical History: Reviewed in chart  Tobacco: She reports that she has never smoked. She has never used smokeless tobacco.  ----------------    I spent 19 minutes with this patient today. All changes were made via collaborative practice agreement with Dr. Velvet Huston. A copy of the visit note was provided to the patient's provider(s).    A summary of these recommendations was sent via [employer].    Rina Oliveira, PharmD  Medication Therapy Management Pharmacist   [facility] Dermatology    Telemedicine Visit Details  Type of service:  Telephone visit  Start Time:  1:00pm  End Time:  1:19pm     Medication Therapy Recommendations  No medication therapy recommendations to display

## 2024-04-01 NOTE — Clinical Note
4/1/2024       RE: Daily Vyas  [address] [facility] [address]     Dear Colleague,    Thank you for referring your patient, Daily Vyas, to the [facility] RHEUMATOLOGY CLINIC [place] at [facility]. Please see a copy of my visit note below.    Medication Therapy Management (MTM) Encounter    ASSESSMENT:                            Medication Adherence/Access:   *** coverage - {PA status (Optional):632301}    {Alopecia:514912}:   *** Improving / Well controlled / Flaring , patient would benefit from ***.   Provided education on *** today including dosing, administration, side effects, precautions, monitoring, and time to efficacy.   Discussed avoiding live vaccines and encouraged receiving the following non-live vaccines: *** Covid-19 vaccine (2023-24), annual influenza, Shingrix, Prevnar 20, Tetanus booster.   Pre-biologic labs up-to-date / need to be completed ***.     PLAN:                            ***    Follow-up: {followuptest2:994800}    SUBJECTIVE/OBJECTIVE:                          Daily Vyas is a 28 year old female called for an initial visit. She was referred to me from Dr. Velvet Huston MD.      Reason for visit: Clobetasol shampoo coverage assistance.    Medication Adherence/Access:     Clobetasol propionate 0.05% shampoo: Quantity 118 mL per 1 bottle   [facility]:   - 1 bottle = $38.32 vs 2 bottles = $71.64 vs 3 bottles = $104.96   [facility] (with [employer] savings card):   - 1 bottle = $50.63   [facility] (with [employer] savings card):   - 1 bottle = $56.64  [facility] (with GoodRx savings card):   - 1 bottle = $47.30   [facility] (with GoodRx savings card):   - 1 bottle = $49.99    Non-scarring alopecia /Seborrheic dermatitis:    - Minoxidil 1.25 mg tablet daily    - Spironolactone 150 mg daily  - Clobetasol 0.05% shampoo 1-2 times  "weekly  - Ketoconazole 2% shampoo 3 times weekly  - Laser comb 3 x weekly   Side effects: ***.    Patient reports ***    SALT score (from *** visit with Derm): ***%     Specialist: Dr. Velvet Huston MD, Dermatology. Last visit on 3/19/24. The following was recommended:   - Continue spironolactone 150 mg daily  - Continue minoxidil 1.25 mg daily  - Ok to discontinue topical minoxidil  - Continue ketoconazole 2% shampoo three times per week  - Restart clobetasol shampoo 1-2x  weekly  - Continue with laser comb 3x weekly  - Ok to stop derma-smoothe/FS oil     Previous treatment:   {DERM SYSTEMIC AGENTS:811544}    Allergies/ADRs: Reviewed in chart  Past Medical History: Reviewed in chart  Tobacco: She reports that she has never smoked. She has never used smokeless tobacco.  ----------------    I spent {[facility] total time 3:761586} with this patient today. All changes were made via collaborative practice agreement with Dr. Velvet Huston. A copy of the visit note was provided to the patient's provider(s).    A summary of these recommendations {GIVEN/NOT GIVEN:110844}.    ***    Telemedicine Visit Details  Type of service:  {[facility]:390457::\"Telephone visit\"}  Start Time: {video/phone visit start time:152948}  End Time: {video/phone visit end time:152948}     Medication Therapy Recommendations  No medication therapy recommendations to display     Medication Therapy Management (MTM) Encounter    ASSESSMENT:                            Medication Adherence/Access:   Clobetasol shampoo coverage - Needs PA initiated.Provided information for savings cards to help lower cost at this time & incase insurance denies.     Non-scarring alopecia/Seborrheic dermatitis:    Continues on current regimen with oral minoxidil, spironolactone & ketoconazole shampoo. Plans to incorporate clobetasol shampoo 1-2 times weekly if affordable. Discussed cost options & reviewed instructions for use today.     PLAN:                        "     Resent prescription for Clobetasol shampoo to [facility] with [employer] savings card.   Instructions: Apply to dry scalp, leave on 10 mins then lather & rinse. Apply 1-2 times weekly.   Will route request to central PA team to initiate a new PA for clobetasol shampoo & update patient on the status of this     Follow-up: via [employer] message with updates on coverage of clobetasol shampoo and via [employer] message as needed with questions or concerns on medications in future     SUBJECTIVE/OBJECTIVE:                          Daily Vyas is a 28 year old female called for an initial visit. She was referred to me from Dr. Velvet Huston MD.      Reason for visit: Clobetasol shampoo coverage assistance.    Medication Adherence/Access:   Reports clobetasol was ~$100 the last time she tried to fill so she stopped. [employer] indicates insurance is not covering.     Non-scarring alopecia /Seborrheic dermatitis:    - Minoxidil 1.25 mg tablet daily    - Spironolactone 150 mg daily  - Clobetasol 0.05% shampoo 1-2 times weekly (not currently using)   - Ketoconazole 2% shampoo 3 times weekly  - Laser comb 3 x weekly   No current side effects.     Patient reports she was previously on the clobetasol shampoo in the past but then stopped a couple of years ago due to a change in her insurance & this no longer being covered. Would like to start the clobetasol shampoo again if affordable. Notes she has not been using the fluocinolone oil - struggles with adhering due to oil formulation but uses during seasonal flares for dry scalp.     Specialist: Dr. Velvet Huston MD, Dermatology. Last visit on 3/19/24. The following was recommended:   - Continue spironolactone, minoxidil 1.25 mg daily, ketoconazole 2% shampoo  - Ok to discontinue topical minoxidil & fluocinolone oil   - Restart clobetasol shampoo 1-2x  weekly    Previous treatment:   - Fluocinolone 0.01% oil as needed (has not used for a 3-4 weeks, substitute with clobex)      Allergies/ADRs: Reviewed in chart  Past Medical History: Reviewed in chart  Tobacco: She reports that she has never smoked. She has never used smokeless tobacco.  ----------------    I spent 19 minutes with this patient today. All changes were made via collaborative practice agreement with Dr. Velvet Huston. A copy of the visit note was provided to the patient's provider(s).    A summary of these recommendations was sent via [employer].    Aide WoodruffD  Medication Therapy Management Pharmacist   [facility] Dermatology    Telemedicine Visit Details  Type of service:  Telephone visit  Start Time:  1:00pm  End Time:  1:19pm     Medication Therapy Recommendations  No medication therapy recommendations to display       Again, thank you for allowing me to participate in the care of your patient.      Sincerely,    Rina Oliveira RPH

## 2024-04-02 ENCOUNTER — TELEPHONE (OUTPATIENT)
Dept: DERMATOLOGY | Facility: CLINIC | Age: 29
End: 2024-04-02
Payer: COMMERCIAL

## 2024-04-02 NOTE — TELEPHONE ENCOUNTER
Left Voicemail (1st Attempt) for the patient to call back and schedule the following:    Appointment type: follow up  Provider: Tomas  Return date: 03/2025  Specialty phone number: [phone]  Additional appointment(s) needed: na  Additonal Notes: na

## 2024-04-03 NOTE — PATIENT INSTRUCTIONS

## 2024-04-03 NOTE — PROGRESS NOTES
Facial Plastic and Reconstructive Surgery        Daily Vyas is here in follow up. She is s/p LEFT midfacial implant on 2/16/24. She developed some swelling last week and we started her promptly on antibiotics with close follow up today. Today, she reports she is doing better. There is still some erythema. She has intermittent shooting pains. She is still using peridex.         On exam, intraoral incisions is nicely healed. There is no tenderness over the implant. Slight erythema but not consistent with current infection.      A/P: Daily Vyas is a 28 year old female who has a history of an excision of a fibrous histiocytoma of the left cheek s/p excision and reconstruction. We placed a custom midfacial implant on the left due to the residual defect and volume loss from the tumor excision and reconstruction. I am going to extend her antibiotics just to be safe. We will touch base with her next week to see how she is doing.     Our next stage will plan to be excision of the inferior incisional prominence and dermabrasion to the superior incision line. This would be an in office scar revision and dermabrasion.     We could do this in the office. Risks and benefits were discussed including but not limited to bleeding, infection, numbness, damage to motor nerve (permanent or temporary weakness), hematoma, need for additional procedure.      Kelly Manuel MD

## 2024-04-04 ENCOUNTER — OFFICE VISIT (OUTPATIENT)
Dept: PLASTIC SURGERY | Facility: CLINIC | Age: 29
End: 2024-04-04
Payer: COMMERCIAL

## 2024-04-04 DIAGNOSIS — Z98.890 POSTOPERATIVE STATE: Primary | ICD-10-CM

## 2024-04-04 DIAGNOSIS — B99.9 INFECTION: ICD-10-CM

## 2024-04-12 NOTE — TELEPHONE ENCOUNTER
Retail Pharmacy Prior Authorization Team   Phone: [phone]    PA Initiation    Medication: CLOBETASOL PROPIONATE 0.05 % EX SHAM  Insurance Company: [employer] Clinical [employer] - Phone [phone] Fax [phone]  Pharmacy Filling the Rx: [facility] [id number] [place] [facility] [place], [place] - [facility] [address]  Filling Pharmacy Phone: [phone]  Filling Pharmacy Fax:    Start Date: 4/12/2024

## 2024-04-15 NOTE — TELEPHONE ENCOUNTER
Appeal for Clobetasol propionate 0.05% shampoo completed. Routing to [facility] PA team to submit letter from 4/17/24 to patients insurance.     Please route outcome or updates on this appeal to Rina Oliveira RPh

## 2024-04-15 NOTE — TELEPHONE ENCOUNTER
Note: Due to record-high volumes, our turn-around time is taking longer than usual . We are currently 10 business days behind in the pools.   We are working diligently to submit all requests in a timely manner and in the order they are received. Please only flag TRUE URGENT requests as high priority to the pool at this time.   If you have questions - please send a note/message in the active PA encounter and send back to the [facility] PA pool [[id number]].    If you have more specific questions about our process please reach out to our supervisor Toya Gayle.   Thank you!   RPPA (Retail Pharmacy Prior Authorization) team    We are currently submitting requests we received on 04/03/2024    PRIOR AUTHORIZATION DENIED    Medication: CLOBETASOL PROPIONATE 0.05 % EX SHAM  Insurance Company: [employer] Clinical Review - Phone [phone] Fax [phone]  Denial Date: 4/15/2024  Denial Rational:         Appeal Information:   N/A    Patient Notified: No

## 2024-04-17 NOTE — TELEPHONE ENCOUNTER
Medication Appeal Initiation    Medication: CLOBETASOL PROPIONATE 0.05 % EX SHAM  Appeal Start Date:  4/17/2024  Insurance Company:   Insurance Phone:   Insurance Fax:   Comments:       Manually faxed LMN to [phone].

## 2024-04-25 NOTE — TELEPHONE ENCOUNTER
MEDICATION APPEAL APPROVED    Medication: CLOBETASOL PROPIONATE 0.05 % EX SHAM  Authorization Effective Date: 4/17/2024  Authorization Expiration Date: 4/17/2025  Approved Dose/Quantity:   Reference #:     Appeal Insurance Company:   Expected CoPay: $       CoPay Card Available:    Financial Assistance Needed:   [facility] [id number] [place] [address] [facility]  Patient Notified:   Comments:       Received verbal approval.  **Instructed pharmacy to notify patient when script is ready to /ship.**

## 2024-05-08 DIAGNOSIS — Z98.890 POSTOPERATIVE STATE: Primary | ICD-10-CM

## 2024-05-08 RX ORDER — LORAZEPAM 1 MG/1
1 TABLET ORAL PRN
Qty: 1 TABLET | Refills: 0 | Status: SHIPPED | OUTPATIENT
Start: 2024-05-08 | End: 2024-06-18

## 2024-05-16 ENCOUNTER — OFFICE VISIT (OUTPATIENT)
Dept: PLASTIC SURGERY | Facility: CLINIC | Age: 29
End: 2024-05-16
Payer: COMMERCIAL

## 2024-05-16 DIAGNOSIS — D23.9 FIBROUS HISTIOCYTOMA: Primary | ICD-10-CM

## 2024-05-16 NOTE — PROGRESS NOTES
DATE: 05/16/24     SURGEON: Dr. Kelly Manuel MD  PROCEDURE:   1. Scar excision and closure of the left facial scar with complex repair   2. Dermabrasion to left facial scar    Findings:   Length of scar excised: 2cm x 1cm   Length of wound being closed: 2.2cm     ANESTHESIA: Local anesthesia  EBL: 5 mL  DESCRIPTION: Consents were obtained and the patient was appropriately positioned. The skin was prepped and draped in the standard sterile fashion. The skin was injected with 3cc of lidocaine 1% w/ 1:100,000 epinephrine around the area of concern. A 15 blade was then used to excise the area of  prominent scar. Complex repair was then performed. Deep dermal sutures were placed at the areas of maximal tension with a 5-0 monocryl suture. A running locking suture was then used to reapproximate and shirley the skin edges. The patient tolerated the procedure well.    Our attention then turned to dermabrasion. The area was outlined with a marking pen. The site was then prepped and draped in usual sterile fashion. A debra dust bur was used on the dermabrader. Dermabrasion was performed until pin point bleeding was obtained. Ointment and a dressing where then placed over the area.     The patient tolerated the procedure well.       Follow-up:  Wound care as discussed   Please f/u as scheduled for sutures or sooner if issues

## 2024-05-22 ENCOUNTER — OFFICE VISIT (OUTPATIENT)
Dept: PLASTIC SURGERY | Facility: CLINIC | Age: 29
End: 2024-05-22
Payer: COMMERCIAL

## 2024-05-22 DIAGNOSIS — Z98.890 POSTOPERATIVE STATE: Primary | ICD-10-CM

## 2024-05-23 NOTE — PROGRESS NOTES
Saw Daily one week s/p in-office dermabrasion and left cheek scar revision (5/16/24).    She is healing well, incision is well approximated. Sutures removed from incision line without difficulty.    We discussed continued cares not limited to continued cleansing with 1:1 hydrogen peroxide and water, frequent application of Aquaphor, sun protection and the future use of silicone scar gel and gentle massage. Pt verbalized understanding of this.    All questions answered at this time. Pt instructed to reach out if questions or concerns arise.    Follow-up appt made.    Rebecca Mathur RN  5/23/2024 3:00 PM

## 2024-06-17 SDOH — HEALTH STABILITY: PHYSICAL HEALTH: ON AVERAGE, HOW MANY DAYS PER WEEK DO YOU ENGAGE IN MODERATE TO STRENUOUS EXERCISE (LIKE A BRISK WALK)?: 3 DAYS

## 2024-06-17 SDOH — HEALTH STABILITY: PHYSICAL HEALTH: ON AVERAGE, HOW MANY MINUTES DO YOU ENGAGE IN EXERCISE AT THIS LEVEL?: 40 MIN

## 2024-06-17 ASSESSMENT — SOCIAL DETERMINANTS OF HEALTH (SDOH): HOW OFTEN DO YOU GET TOGETHER WITH FRIENDS OR RELATIVES?: TWICE A WEEK

## 2024-06-18 ENCOUNTER — OFFICE VISIT (OUTPATIENT)
Dept: FAMILY MEDICINE | Facility: CLINIC | Age: 29
End: 2024-06-18
Payer: COMMERCIAL

## 2024-06-18 VITALS
OXYGEN SATURATION: 99 % | HEART RATE: 77 BPM | TEMPERATURE: 98.2 F | DIASTOLIC BLOOD PRESSURE: 82 MMHG | WEIGHT: 243.2 LBS | BODY MASS INDEX: 38.17 KG/M2 | HEIGHT: 67 IN | RESPIRATION RATE: 20 BRPM | SYSTOLIC BLOOD PRESSURE: 125 MMHG

## 2024-06-18 DIAGNOSIS — Z30.41 ENCOUNTER FOR BIRTH CONTROL PILLS MAINTENANCE: ICD-10-CM

## 2024-06-18 DIAGNOSIS — E66.812 CLASS 2 OBESITY DUE TO EXCESS CALORIES WITHOUT SERIOUS COMORBIDITY WITH BODY MASS INDEX (BMI) OF 38.0 TO 38.9 IN ADULT: ICD-10-CM

## 2024-06-18 DIAGNOSIS — B37.31 YEAST INFECTION OF THE VAGINA: ICD-10-CM

## 2024-06-18 DIAGNOSIS — E55.9 VITAMIN D DEFICIENCY: ICD-10-CM

## 2024-06-18 DIAGNOSIS — N89.8 VAGINAL ODOR: ICD-10-CM

## 2024-06-18 DIAGNOSIS — Z11.59 NEED FOR HEPATITIS C SCREENING TEST: ICD-10-CM

## 2024-06-18 DIAGNOSIS — Z11.4 SCREENING FOR HIV (HUMAN IMMUNODEFICIENCY VIRUS): ICD-10-CM

## 2024-06-18 DIAGNOSIS — Z00.00 ROUTINE GENERAL MEDICAL EXAMINATION AT A HEALTH CARE FACILITY: Primary | ICD-10-CM

## 2024-06-18 DIAGNOSIS — E66.09 CLASS 2 OBESITY DUE TO EXCESS CALORIES WITHOUT SERIOUS COMORBIDITY WITH BODY MASS INDEX (BMI) OF 38.0 TO 38.9 IN ADULT: ICD-10-CM

## 2024-06-18 DIAGNOSIS — Z12.4 CERVICAL CANCER SCREENING: ICD-10-CM

## 2024-06-18 DIAGNOSIS — Z13.220 SCREENING FOR LIPID DISORDERS: ICD-10-CM

## 2024-06-18 DIAGNOSIS — R79.0 LOW IRON STORES: ICD-10-CM

## 2024-06-18 PROBLEM — Z30.9 ENCOUNTER FOR CONTRACEPTIVE MANAGEMENT: Status: ACTIVE | Noted: 2020-12-15

## 2024-06-18 PROBLEM — L63.9 ALOPECIA AREATA: Status: ACTIVE | Noted: 2017-12-22

## 2024-06-18 PROBLEM — D48.19: Status: ACTIVE | Noted: 2021-11-03

## 2024-06-18 LAB
ALBUMIN SERPL BCG-MCNC: 4.5 G/DL (ref 3.5–5.2)
ALP SERPL-CCNC: 98 U/L (ref 40–150)
ALT SERPL W P-5'-P-CCNC: 19 U/L (ref 0–50)
ANION GAP SERPL CALCULATED.3IONS-SCNC: 11 MMOL/L (ref 7–15)
AST SERPL W P-5'-P-CCNC: 17 U/L (ref 0–45)
BILIRUB SERPL-MCNC: 0.3 MG/DL
BUN SERPL-MCNC: 12.8 MG/DL (ref 6–20)
CALCIUM SERPL-MCNC: 9.5 MG/DL (ref 8.6–10)
CHLORIDE SERPL-SCNC: 103 MMOL/L (ref 98–107)
CHOLEST SERPL-MCNC: 156 MG/DL
CLUE CELLS: ABNORMAL
CREAT SERPL-MCNC: 0.79 MG/DL (ref 0.51–0.95)
DEPRECATED HCO3 PLAS-SCNC: 23 MMOL/L (ref 22–29)
EGFRCR SERPLBLD CKD-EPI 2021: >90 ML/MIN/1.73M2
FASTING STATUS PATIENT QL REPORTED: NORMAL
FASTING STATUS PATIENT QL REPORTED: NORMAL
FERRITIN SERPL-MCNC: 25 NG/ML (ref 6–175)
GLUCOSE SERPL-MCNC: 96 MG/DL (ref 70–99)
HCV AB SERPL QL IA: NONREACTIVE
HDLC SERPL-MCNC: 62 MG/DL
HIV 1+2 AB+HIV1 P24 AG SERPL QL IA: NONREACTIVE
IRON BINDING CAPACITY (ROCHE): 346 UG/DL (ref 240–430)
IRON SATN MFR SERPL: 36 % (ref 15–46)
IRON SERPL-MCNC: 124 UG/DL (ref 37–145)
LDLC SERPL CALC-MCNC: 74 MG/DL
MAGNESIUM SERPL-MCNC: 2 MG/DL (ref 1.7–2.3)
NONHDLC SERPL-MCNC: 94 MG/DL
POTASSIUM SERPL-SCNC: 4.1 MMOL/L (ref 3.4–5.3)
PROT SERPL-MCNC: 7.2 G/DL (ref 6.4–8.3)
SODIUM SERPL-SCNC: 137 MMOL/L (ref 135–145)
TRICHOMONAS, WET PREP: ABNORMAL
TRIGL SERPL-MCNC: 101 MG/DL
VIT D+METAB SERPL-MCNC: 25 NG/ML (ref 20–50)
WBC'S/HIGH POWER FIELD, WET PREP: ABNORMAL
YEAST, WET PREP: PRESENT

## 2024-06-18 PROCEDURE — 90480 ADMN SARSCOV2 VAC 1/ONLY CMP: CPT | Performed by: PHYSICIAN ASSISTANT

## 2024-06-18 PROCEDURE — 83550 IRON BINDING TEST: CPT | Performed by: PHYSICIAN ASSISTANT

## 2024-06-18 PROCEDURE — 87389 HIV-1 AG W/HIV-1&-2 AB AG IA: CPT | Performed by: PHYSICIAN ASSISTANT

## 2024-06-18 PROCEDURE — 99395 PREV VISIT EST AGE 18-39: CPT | Mod: 25 | Performed by: PHYSICIAN ASSISTANT

## 2024-06-18 PROCEDURE — 82728 ASSAY OF FERRITIN: CPT | Performed by: PHYSICIAN ASSISTANT

## 2024-06-18 PROCEDURE — 99214 OFFICE O/P EST MOD 30 MIN: CPT | Mod: 25 | Performed by: PHYSICIAN ASSISTANT

## 2024-06-18 PROCEDURE — 36415 COLL VENOUS BLD VENIPUNCTURE: CPT | Performed by: PHYSICIAN ASSISTANT

## 2024-06-18 PROCEDURE — 87210 SMEAR WET MOUNT SALINE/INK: CPT | Performed by: PHYSICIAN ASSISTANT

## 2024-06-18 PROCEDURE — 83540 ASSAY OF IRON: CPT | Performed by: PHYSICIAN ASSISTANT

## 2024-06-18 PROCEDURE — G0145 SCR C/V CYTO,THINLAYER,RESCR: HCPCS | Performed by: PHYSICIAN ASSISTANT

## 2024-06-18 PROCEDURE — 80053 COMPREHEN METABOLIC PANEL: CPT | Performed by: PHYSICIAN ASSISTANT

## 2024-06-18 PROCEDURE — 83735 ASSAY OF MAGNESIUM: CPT | Performed by: PHYSICIAN ASSISTANT

## 2024-06-18 PROCEDURE — 86803 HEPATITIS C AB TEST: CPT | Performed by: PHYSICIAN ASSISTANT

## 2024-06-18 PROCEDURE — 80061 LIPID PANEL: CPT | Performed by: PHYSICIAN ASSISTANT

## 2024-06-18 PROCEDURE — 82306 VITAMIN D 25 HYDROXY: CPT | Performed by: PHYSICIAN ASSISTANT

## 2024-06-18 PROCEDURE — 91320 SARSCV2 VAC 30MCG TRS-SUC IM: CPT | Performed by: PHYSICIAN ASSISTANT

## 2024-06-18 RX ORDER — NORGESTIMATE AND ETHINYL ESTRADIOL 7DAYSX3 LO
1 KIT ORAL DAILY
Qty: 84 TABLET | Refills: 3 | Status: SHIPPED | OUTPATIENT
Start: 2024-06-18

## 2024-06-18 RX ORDER — CHLORHEXIDINE GLUCONATE ORAL RINSE 1.2 MG/ML
SOLUTION DENTAL
COMMUNITY
Start: 2024-04-03

## 2024-06-18 RX ORDER — FLUCONAZOLE 150 MG/1
150 TABLET ORAL ONCE
Qty: 1 TABLET | Refills: 0 | Status: SHIPPED | OUTPATIENT
Start: 2024-06-18 | End: 2024-06-18

## 2024-06-18 ASSESSMENT — PAIN SCALES - GENERAL: PAINLEVEL: NO PAIN (0)

## 2024-06-18 NOTE — NURSING NOTE
Prior to immunization administration, verified patients identity using patient s name and date of birth. Please see Immunization Activity for additional information.     Screening Questionnaire for Adult Immunization    Are you sick today?   No   Do you have allergies to medications, food, a vaccine component or latex?   No   Have you ever had a serious reaction after receiving a vaccination?   No   Do you have a long-term health problem with heart, lung, kidney, or metabolic disease (e.g., diabetes), asthma, a blood disorder, no spleen, complement component deficiency, a cochlear implant, or a spinal fluid leak?  Are you on long-term aspirin therapy?   No   Do you have cancer, leukemia, HIV/AIDS, or any other immune system problem?   No   Do you have a parent, brother, or sister with an immune system problem?   No   In the past 3 months, have you taken medications that affect  your immune system, such as prednisone, other steroids, or anticancer drugs; drugs for the treatment of rheumatoid arthritis, Crohn s disease, or psoriasis; or have you had radiation treatments?   No   Have you had a seizure, or a brain or other nervous system problem?   No   During the past year, have you received a transfusion of blood or blood    products, or been given immune (gamma) globulin or antiviral drug?   No   For women: Are you pregnant or is there a chance you could become       pregnant during the next month?   No   Have you received any vaccinations in the past 4 weeks?   No     Immunization questionnaire answers were all negative.      Patient instructed to remain in clinic for 15 minutes afterwards, and to report any adverse reactions.     Screening performed by Kalani Pickard MA on 6/18/2024 at 3:45 PM.

## 2024-06-18 NOTE — PROGRESS NOTES
Preventive Care Visit  [facility]  CATE Calles, Physician Assistant - Medical  Jun 18, 2024      Assessment & Plan     Routine general medical examination at a health care facility  Repeat 1 year   - Comprehensive metabolic panel (BMP + Alb, Alk Phos, ALT, AST, Total. Bili, TP); Future  - Magnesium; Future  - Comprehensive metabolic panel (BMP + Alb, Alk Phos, ALT, AST, Total. Bili, TP)  - Magnesium    Screening for HIV (human immunodeficiency virus)  - HIV Antigen Antibody Combo; Future  - HIV Antigen Antibody Combo    Need for hepatitis C screening test  - Hepatitis C Screen Reflex to HCV RNA Quant and Genotype; Future  - Hepatitis C Screen Reflex to HCV RNA Quant and Genotype    Cervical cancer screening  - Pap Screen Reflex to HPV if ASCUS - Recommended Age 25 - 29 Years    Encounter for birth control pills maintenance  - norgestim-eth estrad triphasic (ORTHO TRI-CYCLEN LO) 0.18/0.215/0.25 MG-25 MCG tablet; Take 1 tablet by mouth daily    Low iron stores  Does take an iron supplement daily. Periods have improved being on combined OCP  - Ferritin; Future  - Iron and iron binding capacity; Future  - Ferritin  - Iron and iron binding capacity    Vaginal odor  - Wet prep - Clinic Collect    Screening for lipid disorders  - Lipid panel reflex to direct LDL Non-fasting; Future  - Lipid panel reflex to direct LDL Non-fasting    Class 2 obesity due to excess calories without serious comorbidity with body mass index (BMI) of 38.0 to 38.9 in adult  Diet and exercise discussed. Was seeing a nutritionist      Vitamin D deficiency  Does take a vitamin D supplement daily  - Vitamin D Deficiency; Future  - Vitamin D Deficiency    Yeast infection of the vagina  Wet prep positive for yeast   - fluconazole (DIFLUCAN) 150 MG tablet; Take 1 tablet (150 mg) by mouth once for 1 dose    Patient has been advised of split billing requirements and indicates understanding: Yes        BMI  Estimated body  "mass index is 38.09 kg/m  as calculated from the following:    Height as of this encounter: 1.702 m (5' 7\").    Weight as of this encounter: 110.3 kg (243 lb 3.2 oz).       Counseling  Appropriate preventive services were discussed with this patient, including applicable screening as appropriate for fall prevention, nutrition, physical activity, Tobacco-use cessation, weight loss and cognition.  Checklist reviewing preventive services available has been given to the patient.  Reviewed patient's diet, addressing concerns and/or questions.   She is at risk for lack of exercise and has been provided with information to increase physical activity for the benefit of her well-being.           Prabhakar Ambrosio is a 28 year old, presenting for the following:  Physical        6/18/2024     3:15 PM   Additional Questions   Roomed by Kalani   Accompanied by none         6/18/2024     3:15 PM   Patient Reported Additional Medications   Patient reports taking the following new medications none        Health Care Directive  Patient does not have a Health Care Directive or Living Will: Discussed advance care planning with patient; however, patient declined at this time.    HPI  Overall doing well. She has hx of iron and vitamin D deficiency so would like these checked. She was also seeing a nutritionist to help with healthy diet and they told her to get other labs done such as minerals and electrolytes.   She would also like to do a wet prep. She had BV last year and has noticed a slight odor again.             6/17/2024   General Health   How would you rate your overall physical health? Good   Feel stress (tense, anxious, or unable to sleep) To some extent   (!) STRESS CONCERN      6/17/2024   Nutrition   Three or more servings of calcium each day? (!) NO   Diet: Vegetarian/vegan   How many servings of fruit and vegetables per day? (!) 0-1   How many sweetened beverages each day? 0-1         6/17/2024   Exercise   Days per " week of moderate/strenous exercise 3 days   Average minutes spent exercising at this level 40 min         6/17/2024   Social Factors   Frequency of gathering with friends or relatives Twice a week   Worry food won't last until get money to buy more No   Food not last or not have enough money for food? No   Do you have housing?  Yes   Are you worried about losing your housing? No   Lack of transportation? No   Unable to get utilities (heat,electricity)? No         6/17/2024   Dental   Dentist two times every year? Yes         6/17/2024   TB Screening   Were you born outside of the [place]? No               6/17/2024   Substance Use   Alcohol more than 3/day or more than 7/wk No   Do you use any other substances recreationally? No     Social History     Tobacco Use    Smoking status: Never    Smokeless tobacco: Never   Vaping Use    Vaping status: Never Used   Substance Use Topics    Alcohol use: Yes     Comment: Socially    Drug use: Never           4/13/2023   LAST FHS-7 RESULTS   1st degree relative breast or ovarian cancer No   Any relative bilateral breast cancer No   Any male have breast cancer No   Any ONE woman have BOTH breast AND ovarian cancer No   Any woman with breast cancer before 50yrs No   2 or more relatives with breast AND/OR ovarian cancer No   2 or more relatives with breast AND/OR bowel cancer No        Mammogram Screening - Patient under 40 years of age: Routine Mammogram Screening not recommended.           6/17/2024   One time HIV Screening   Previous HIV test? I don't know         6/17/2024   STI Screening   New sexual partner(s) since last STI/HIV test? No     History of abnormal Pap smear: No - age 30- 64 PAP with HPV every 5 years recommended             6/17/2024   Contraception/Family Planning   Questions about contraception or family planning No        Reviewed and updated as needed this visit by Provider                          Review of Systems  Constitutional, HEENT, cardiovascular,  "pulmonary, GI, , musculoskeletal, neuro, skin, endocrine and psych systems are negative, except as otherwise noted.     Objective    Exam  /82 (BP Location: Right arm, Patient Position: Sitting, Cuff Size: Adult Large)   Pulse 77   Temp 98.2  F (36.8  C) (Oral)   Resp 20   Ht 1.702 m (5' 7\")   Wt 110.3 kg (243 lb 3.2 oz)   LMP 05/31/2024 (Exact Date)   SpO2 99%   Breastfeeding No   BMI 38.09 kg/m     Estimated body mass index is 38.09 kg/m  as calculated from the following:    Height as of this encounter: 1.702 m (5' 7\").    Weight as of this encounter: 110.3 kg (243 lb 3.2 oz).    Physical Exam  GENERAL: alert and no distress  EYES: Eyes grossly normal to inspection, PERRL and conjunctivae and sclerae normal  HENT: ear canals and TM's normal, nose and mouth without ulcers or lesions  NECK: no adenopathy, no asymmetry, masses, or scars  RESP: lungs clear to auscultation - no rales, rhonchi or wheezes  CV: regular rate and rhythm, normal S1 S2, no S3 or S4, no murmur, click or rub, no peripheral edema  ABDOMEN: soft, nontender, no hepatosplenomegaly, no masses and bowel sounds normal   (female) w/bimanual: normal female external genitalia, normal urethral meatus, normal vaginal mucosa, and normal cervix/adnexa/uterus without masses or discharge  MS: no gross musculoskeletal defects noted, no edema  SKIN: no suspicious lesions or rashes  NEURO: Normal strength and tone, mentation intact and speech normal  PSYCH: mentation appears normal, affect normal/bright        Signed Electronically by: CATE Calles    "

## 2024-06-18 NOTE — PATIENT INSTRUCTIONS
"Patient Education   Preventive Care Advice   This is general advice we often give to help people stay healthy. Your care team may have specific advice just for you. Please talk to your care team about your own preventive care needs.  Lifestyle  Exercise at least 150 minutes each week (30 minutes a day, 5 days a week).  Do muscle strengthening activities 2 days a week. These help control your weight and prevent disease.  No smoking.  Wear sunscreen to prevent skin cancer.  Have your home tested for radon every 2 to 5 years. Radon is a colorless, odorless gas that can harm your lungs. To learn more, go to www.health.[facility].[place].us and search for \"Radon in Homes.\"  Keep guns unloaded and locked up in a safe place like a safe or gun vault, or, use a gun lock and hide the keys. Always lock away bullets separately. To learn more, visit [employer].[place].gov and search for \"safe gun storage.\"  Nutrition  Eat 5 or more servings of fruits and vegetables each day.  Try wheat bread, brown rice and whole grain pasta (instead of white bread, rice, and pasta).  Get enough calcium and vitamin D. Check the label on foods and aim for 100% of the RDA (recommended daily allowance).  Regular exams  Have a dental exam and cleaning every 6 months.  See your health care team every year to talk about:  Any changes in your health.  Any medicines your care team has prescribed.  Preventive care, family planning, and ways to prevent chronic diseases.  Shots (vaccines)   HPV shots (up to age 26), if you've never had them before.  Hepatitis B shots (up to age 59), if you've never had them before.  COVID-19 shot: Get this shot when it's due.  Flu shot: Get a flu shot every year.  Tetanus shot: Get a tetanus shot every 10 years.  Pneumococcal, hepatitis A, and RSV shots: Ask your care team if you need these based on your risk.  Shingles shot (for age 50 and up).  General health tests  Diabetes screening:  Starting at age 35, Get screened for diabetes at least " every 3 years.  If you are younger than age 35, ask your care team if you should be screened for diabetes.  Cholesterol test: At age 39, start having a cholesterol test every 5 years, or more often if advised.  Bone density scan (DEXA): At age 50, ask your care team if you should have this scan for osteoporosis (brittle bones).  Hepatitis C: Get tested at least once in your life.  Abdominal aortic aneurysm screening: Talk to your doctor about having this screening if you:  Have ever smoked; and  Are biologically male; and  Are between the ages of 65 and 75.  STIs (sexually transmitted infections)  Before age 24: Ask your care team if you should be screened for STIs.  After age 24: Get screened for STIs if you're at risk. You are at risk for STIs (including HIV) if:  You are sexually active with more than one person.  You don't use condoms every time.  You or a partner was diagnosed with a sexually transmitted infection.  If you are at risk for HIV, ask about PrEP medicine to prevent HIV.  Get tested for HIV at least once in your life, whether you are at risk for HIV or not.  Cancer screening tests  Cervical cancer screening: If you have a cervix, begin getting regular cervical cancer screening tests at age 21. Most people who have regular screenings with normal results can stop after age 65. Talk about this with your provider.  Breast cancer scan (mammogram): If you've ever had breasts, begin having regular mammograms starting at age 40. This is a scan to check for breast cancer.  Colon cancer screening: It is important to start screening for colon cancer at age 45.  Have a colonoscopy test every 10 years (or more often if you're at risk) Or, ask your provider about stool tests like a FIT test every year or Cologuard test every 3 years.  To learn more about your testing options, visit: www.[employer]/244731.pdf.  For help making a decision, visit: shaquille/du69861.  Prostate cancer screening test: If you have a  prostate and are age 55 to 69, ask your provider if you would benefit from a yearly prostate cancer screening test.  Lung cancer screening: If you are a current or former smoker age 50 to 80, ask your care team if ongoing lung cancer screenings are right for you.  For informational purposes only. Not to replace the advice of your health care provider. Copyright   2023 [facility]. All rights reserved. Clinically reviewed by the [facility] Transitions Program. [employer] 008061 - REV 04/24.  Eating Healthy Foods: Care Instructions  With every meal, you can make healthy food choices. Try to eat a variety of fruits, vegetables, whole grains, lean proteins, and low-fat dairy products. This can help you get the right balance of nutrients, including vitamins and minerals. Small changes add up over time. You can start by adding one healthy food to your meals each day.    Try to make half your plate fruits and vegetables, one-fourth whole grains, and one-fourth lean proteins. Try including dairy with your meals.   Eat more fruits and vegetables. Try to have them with most meals and snacks.   Foods for healthy eating    Fruits    These can be fresh, frozen, canned, or dried.  Try to choose whole fruit rather than fruit juice.  Eat a variety of colors.    Vegetables    These can be fresh, frozen, canned, or dried.  Beans, peas, and lentils count too.    Whole grains    Choose whole-grain breads, cereals, and noodles.  Try brown rice.    Lean proteins    These can include lean meat, poultry, fish, and eggs.  You can also have tofu, beans, peas, lentils, nuts, and seeds.    Dairy    Try milk, yogurt, and cheese.  Choose low-fat or fat-free when you can.  If you need to, use lactose-free milk or fortified plant-based milk products, such as soy milk.    Water    Drink water when you're thirsty.  Limit sugar-sweetened drinks, including soda, fruit drinks, and sports drinks.  Where can you learn more?  Go to  "https://www.[employer].net/patiented  Enter T756 in the search box to learn more about \"Eating Healthy Foods: Care Instructions.\"  Current as of: September 20, 2023               Content Version: 14.0    1487-2893 [employer].   Care instructions adapted under license by your healthcare professional. If you have questions about a medical condition or this instruction, always ask your healthcare professional. Healthwise[employer] disclaims any warranty or liability for your use of this information.         "

## 2024-06-21 LAB
BKR LAB AP GYN ADEQUACY: NORMAL
BKR LAB AP GYN INTERPRETATION: NORMAL
BKR LAB AP HPV REFLEX: NORMAL
BKR LAB AP LMP: NORMAL
BKR LAB AP PREVIOUS ABNORMAL: NORMAL
PATH REPORT.COMMENTS IMP SPEC: NORMAL
PATH REPORT.COMMENTS IMP SPEC: NORMAL
PATH REPORT.RELEVANT HX SPEC: NORMAL

## 2024-06-24 NOTE — PROGRESS NOTES
Facial Plastic and Reconstructive Surgery      Daily Vyas is s/p dermabrasion and scar revision on 5/16/24. Today, she reports she is doing okay. Still some tenderness. The redness is getting better.     On exam, the dermabrasion sites look good. The inferior aspect of the scar revision is more erythematous than usual at this point, but no evidence of infection.     A/P: I would like to see her back in 3 months, sooner if there are issues.   We discussed continued wound care including silicone and sun protection. The redness should continue to fade but I encouraged her to reach out if she has any concerns prior to her follow up.     Kelly Manuel MD

## 2024-06-25 ENCOUNTER — OFFICE VISIT (OUTPATIENT)
Dept: PLASTIC SURGERY | Facility: CLINIC | Age: 29
End: 2024-06-25
Payer: COMMERCIAL

## 2024-06-25 DIAGNOSIS — Z98.890 POSTOPERATIVE STATE: Primary | ICD-10-CM

## 2024-06-27 ENCOUNTER — MYC MEDICAL ADVICE (OUTPATIENT)
Dept: PLASTIC SURGERY | Facility: CLINIC | Age: 29
End: 2024-06-27

## 2024-06-28 NOTE — TELEPHONE ENCOUNTER
Pt also reached out to me directly re: her concern over the redness in her incision where the suture came out.    We discussed that it is likely a stitch abscess from a stitch that didn't fully dissolve, so her body created inflammation/irritation around it.    No need for an antibiotic.  Pt verbalized understanding and will let us know if anything changes.    Leslie Pace RN  6/28/2024 10:22 AM

## 2024-08-13 NOTE — NURSING NOTE
Airway       Patient location during procedure: OR       Procedure Start/Stop Times: 8/13/2024 12:41 PM  Staff -        Anesthesiologist:  Jojo Angeles MD       CRNA: Jennifer Patterson APRN CRNA       Other Anesthesia Staff: Padmaja Galvez       Performed By: SRNA  Consent for Airway        Urgency: elective  Indications and Patient Condition       Indications for airway management: megan-procedural       Induction type:intravenous       Mask difficulty assessment: 2 - vent by mask + OA or adjuvant +/- NMBA    Final Airway Details       Final airway type: endotracheal airway       Successful airway: ETT - single  Endotracheal Airway Details        ETT size (mm): 7.5       Cuffed: yes       Successful intubation technique: video laryngoscopy       VL Blade Size: Glidescope 3       Grade View of Cords: 1       Adjucts: stylet       Position: Left       Measured from: lips       Secured at (cm): 23       Bite block used: None    Post intubation assessment        Placement verified by: capnometry and equal breath sounds        Number of attempts at approach: 1       Number of other approaches attempted: 0       Secured with: tape       Ease of procedure: easy       Dentition: Intact and Unchanged    Medication(s) Administered   Medication Administration Time: 8/13/2024 12:41 PM         Chief Complaint   Patient presents with     Derm Problem     Patient is here today for a Co2 laser regarding the left cheek.     Nevaeh PERKINS RN

## 2024-08-15 DIAGNOSIS — B99.9 INFECTION: Primary | ICD-10-CM

## 2024-08-22 NOTE — PROGRESS NOTES
Facial Plastic and Reconstructive Surgery      Daily Vyas is a 29 year old female s/p the following procedures:   LEFT midfacial implant on 2/16/24.   2.   In-office dermabrasion and left cheek scar revision (5/16/24).     She developed some drainage and erythema of the inferior aspect of the incision and we started her on antibiotics last week.     Today, she reports she is doing better. Finished antibiotics yesterday. No further drainage.     On exam, the inferior incisions is erythematous and appears somewhat widened. No drainable fluid collection. No evidence of infection.     A/P: We will just continue to wait at this point to see how things progress. We discussed possibility of revision versus dermabrasion versus laser. I will see her back in 3 months, sooner if there are issues.     I spent a total of 15 minutes in the care of patient Daily Vyas during today's office visit. This time includes reviewing the patient's chart and prior history, obtaining a history, performing an examination and evaluation and counseling the patient. This time also includes ordering mediations or tests necessary in addition to communication to other member's of the patient's health care team. Time spent in documentation and care coordination is included.

## 2024-08-23 ENCOUNTER — OFFICE VISIT (OUTPATIENT)
Dept: PLASTIC SURGERY | Facility: CLINIC | Age: 29
End: 2024-08-23
Payer: COMMERCIAL

## 2024-08-23 DIAGNOSIS — D23.9 FIBROUS HISTIOCYTOMA: Primary | ICD-10-CM

## 2024-08-30 ENCOUNTER — TELEPHONE (OUTPATIENT)
Dept: OTOLARYNGOLOGY | Facility: CLINIC | Age: 29
End: 2024-08-30

## 2024-08-30 DIAGNOSIS — B99.9 INFECTION: Primary | ICD-10-CM

## 2024-08-30 RX ORDER — MUPIROCIN 20 MG/G
OINTMENT TOPICAL 3 TIMES DAILY
Qty: 22 G | Refills: 0 | Status: SHIPPED | OUTPATIENT
Start: 2024-08-30

## 2024-08-30 NOTE — TELEPHONE ENCOUNTER
Pt sent images of incision looking red and opened. Spoke with Dr. Manuel who feels that a course of antibiotics, both topical and oral, would be best. RN called pt to relay this information to her and sent prescription for both to the pt's preferred pharmacy. Dr. Manuel will also see pt in clinic on Tuesday to assess incision.     Sherin Carrasquillo RN  08/30/24 1:29 PM

## 2024-09-03 ENCOUNTER — OFFICE VISIT (OUTPATIENT)
Dept: PLASTIC SURGERY | Facility: CLINIC | Age: 29
End: 2024-09-03
Payer: COMMERCIAL

## 2024-09-03 DIAGNOSIS — D48.19 SPINDLE CELL TYPE ATYPICAL FIBROXANTHOMA: Primary | ICD-10-CM

## 2024-09-03 DIAGNOSIS — B99.9 INFECTION: ICD-10-CM

## 2024-10-28 NOTE — PROGRESS NOTES
Facial Plastic and Reconstructive Surgery        Daily Vyas is a 29 year old female s/p the following procedures:   1. LEFT midfacial implant on 2/16/24.   2.   In-office dermabrasion and left cheek scar revision (5/16/24).      She developed some additional erythema of the inferior aspect of the incision and we started her on antibiotics again last week.      Today, she reports she is doing better. Had some bloody clear drainage this morning. The redness is improving.      On exam, the inferior incision is erythematous and appears somewhat widened. No drainable fluid collection. No evidence of current infection.      A/P: I am going to extend her antibiotics as she is going on vacation. I would like her to finish those out and continue bactroban. She will return at her previously scheduled visit or sooner if there are any issues at all. I counseled her not to pick or try to drain anything if it seems to fill up.      I spent a total of 20 minutes in the care of patient Daily Vyas during today's office visit. This time includes reviewing the patient's chart and prior history, obtaining a history, performing an examination and evaluation and counseling the patient. This time also includes ordering mediations or tests necessary in addition to communication to other member's of the patient's health care team. Time spent in documentation and care coordination is included.                  (0) No aphasia; normal

## 2024-11-20 NOTE — PROGRESS NOTES
Facial Plastic and Reconstructive Surgery        Daily Vyas is a 29 year old female s/p the following procedures:   1.    LEFT midfacial implant on 2/16/24.   2.   In-office dermabrasion and left cheek scar revision (5/16/24).      Today, she reports she is doing well. Incision is still red and erythematous She feels the makeup she wears irritates the scar.      On exam, the inferior incision is erythematous and appears somewhat widened. No drainable fluid collection. No evidence of current infection. Normal firmness to underlying scar tissue.      A/P: She is now 6 months out from her in office dermabrasion and scar revision. She was on multiple rounds of antibiotics postoperatively due to some concern for infection.   Today, she is doing well. The scar is stable. We discussed a re-excision of the area which she is not thrilled about. She will think about it. If she wants to do it I would not charge her for her and would not put it through insurance. We will have to think about what suture to use for her.     I would like to see her back in 3-4 months, sooner if there are issues.     I spent a total of 20 minutes in the care of patient Daily Vyas during today's office visit. This time includes reviewing the patient's chart and prior history, obtaining a history, performing an examination and evaluation and counseling the patient. This time also includes ordering mediations or tests necessary in addition to communication to other member's of the patient's health care team. Time spent in documentation and care coordination is included.

## 2024-11-21 ENCOUNTER — OFFICE VISIT (OUTPATIENT)
Dept: PLASTIC SURGERY | Facility: CLINIC | Age: 29
End: 2024-11-21
Payer: COMMERCIAL

## 2024-11-21 DIAGNOSIS — D23.9 FIBROUS HISTIOCYTOMA: Primary | ICD-10-CM

## 2024-12-18 ENCOUNTER — TELEPHONE (OUTPATIENT)
Dept: OTOLARYNGOLOGY | Facility: CLINIC | Age: 29
End: 2024-12-18

## 2024-12-18 DIAGNOSIS — B99.9 INFECTION: ICD-10-CM

## 2024-12-18 DIAGNOSIS — M95.2 ACQUIRED FACIAL DEFORMITY: Primary | ICD-10-CM

## 2024-12-18 NOTE — TELEPHONE ENCOUNTER
Pt sent photos to RN concerned about new swelling around implant site. She stated that the last time this happened, she was put on abx.     Dr. Manuel was informed and she recommended Augmentin, BID, for 10 days. That prescription was sent to pt's preferred pharmacy.     Sherin Carrasquillo RN  12/18/24 4:03 PM

## 2024-12-23 DIAGNOSIS — B99.9 INFECTION: Primary | ICD-10-CM

## 2024-12-23 NOTE — PROGRESS NOTES
Pt called stating that she felt the swelling in her face had gone down initially, but felt the swelling had returned. Dr. Manuel was informed and stated that pt should be put on another 2 weeks of antibiotics and should start warm compresses.     Sherin Carrasquillo RN  12/23/24 9:19 AM

## 2025-01-27 SDOH — HEALTH STABILITY: PHYSICAL HEALTH: ON AVERAGE, HOW MANY DAYS PER WEEK DO YOU ENGAGE IN MODERATE TO STRENUOUS EXERCISE (LIKE A BRISK WALK)?: 4 DAYS

## 2025-01-27 SDOH — HEALTH STABILITY: PHYSICAL HEALTH: ON AVERAGE, HOW MANY MINUTES DO YOU ENGAGE IN EXERCISE AT THIS LEVEL?: 30 MIN

## 2025-01-27 ASSESSMENT — SOCIAL DETERMINANTS OF HEALTH (SDOH): HOW OFTEN DO YOU GET TOGETHER WITH FRIENDS OR RELATIVES?: TWICE A WEEK

## 2025-01-28 ENCOUNTER — OFFICE VISIT (OUTPATIENT)
Dept: FAMILY MEDICINE | Facility: CLINIC | Age: 30
End: 2025-01-28
Payer: COMMERCIAL

## 2025-01-28 VITALS
SYSTOLIC BLOOD PRESSURE: 125 MMHG | DIASTOLIC BLOOD PRESSURE: 85 MMHG | OXYGEN SATURATION: 97 % | HEART RATE: 71 BPM | BODY MASS INDEX: 38.47 KG/M2 | RESPIRATION RATE: 18 BRPM | WEIGHT: 245.13 LBS | TEMPERATURE: 98.1 F | HEIGHT: 67 IN

## 2025-01-28 DIAGNOSIS — D48.19 SPINDLE CELL TYPE ATYPICAL FIBROXANTHOMA: ICD-10-CM

## 2025-01-28 DIAGNOSIS — K13.70 ORAL MUCOSAL LESION: ICD-10-CM

## 2025-01-28 DIAGNOSIS — Z12.83 SKIN CANCER SCREENING: ICD-10-CM

## 2025-01-28 DIAGNOSIS — L63.9 ALOPECIA AREATA: ICD-10-CM

## 2025-01-28 DIAGNOSIS — Z00.00 ROUTINE GENERAL MEDICAL EXAMINATION AT A HEALTH CARE FACILITY: Primary | ICD-10-CM

## 2025-01-28 DIAGNOSIS — Z30.41 ENCOUNTER FOR BIRTH CONTROL PILLS MAINTENANCE: ICD-10-CM

## 2025-01-28 DIAGNOSIS — L21.9 DERMATITIS, SEBORRHEIC: ICD-10-CM

## 2025-01-28 DIAGNOSIS — F41.9 ANXIETY: ICD-10-CM

## 2025-01-28 PROBLEM — L65.9 ALOPECIA: Status: RESOLVED | Noted: 2017-01-28 | Resolved: 2025-01-28

## 2025-01-28 PROCEDURE — G2211 COMPLEX E/M VISIT ADD ON: HCPCS | Performed by: PHYSICIAN ASSISTANT

## 2025-01-28 PROCEDURE — 99214 OFFICE O/P EST MOD 30 MIN: CPT | Mod: 25 | Performed by: PHYSICIAN ASSISTANT

## 2025-01-28 PROCEDURE — 99395 PREV VISIT EST AGE 18-39: CPT | Performed by: PHYSICIAN ASSISTANT

## 2025-01-28 RX ORDER — FLUOXETINE 10 MG/1
10 CAPSULE ORAL DAILY
Qty: 30 CAPSULE | Refills: 1 | Status: SHIPPED | OUTPATIENT
Start: 2025-01-28

## 2025-01-28 RX ORDER — NORGESTIMATE AND ETHINYL ESTRADIOL 7DAYSX3 LO
1 KIT ORAL DAILY
Qty: 84 TABLET | Refills: 3 | Status: SHIPPED | OUTPATIENT
Start: 2025-01-28

## 2025-01-28 ASSESSMENT — ANXIETY QUESTIONNAIRES
1. FEELING NERVOUS, ANXIOUS, OR ON EDGE: NOT AT ALL
2. NOT BEING ABLE TO STOP OR CONTROL WORRYING: MORE THAN HALF THE DAYS
6. BECOMING EASILY ANNOYED OR IRRITABLE: NEARLY EVERY DAY
GAD7 TOTAL SCORE: 11
3. WORRYING TOO MUCH ABOUT DIFFERENT THINGS: MORE THAN HALF THE DAYS
GAD7 TOTAL SCORE: 11
IF YOU CHECKED OFF ANY PROBLEMS ON THIS QUESTIONNAIRE, HOW DIFFICULT HAVE THESE PROBLEMS MADE IT FOR YOU TO DO YOUR WORK, TAKE CARE OF THINGS AT HOME, OR GET ALONG WITH OTHER PEOPLE: SOMEWHAT DIFFICULT
7. FEELING AFRAID AS IF SOMETHING AWFUL MIGHT HAPPEN: SEVERAL DAYS
5. BEING SO RESTLESS THAT IT IS HARD TO SIT STILL: SEVERAL DAYS

## 2025-01-28 ASSESSMENT — PATIENT HEALTH QUESTIONNAIRE - PHQ9: 5. POOR APPETITE OR OVEREATING: MORE THAN HALF THE DAYS

## 2025-01-28 ASSESSMENT — PAIN SCALES - GENERAL: PAINLEVEL_OUTOF10: NO PAIN (0)

## 2025-01-28 NOTE — PROGRESS NOTES
"Preventive Care Visit  [facility]  CATE Calles, Physician Assistant - Medical  Jan 28, 2025      Assessment & Plan     Routine general medical examination at a health care facility  Repeat 1 year     Dermatitis, seborrheic  Follows with dermatology  - Adult Dermatology  Referral; Future    Alopecia areata  Follows with dermatology  - Adult Dermatology  Referral; Future    Spindle cell type atypical fibroxanthoma  Follow with plastic surgery  - Adult Plastic Surgery  Referral; Future    Anxiety  Patient describes having anxiety for most of her life.  She was always considering medication but this never felt ready.  However she is ready now.  She continues to see therapist.  We discussed medication options today to help with anxiety.  Side effects were also discussed.  Advise follow-up in 4 weeks after restarting medication.  Patient agrees with this plan and has no further questions.  - FLUoxetine (PROZAC) 10 MG capsule; Take 1 capsule (10 mg) by mouth daily.    Encounter for birth control pills maintenance  Chronic, stable on current medication, refills good for 1 year  - norgestim-eth estrad triphasic (ORTHO TRI-CYCLEN LO) 0.18/0.215/0.25 MG-25 MCG tablet; Take 1 tablet by mouth daily.    Oral mucosal lesion  Unclear etiology of lesion. It does not appear infected. Possible mucocele but I highly encouraged patient to see dentist and follow up with plastics to evaluate further. Could also do referral to ENT if both specialists are unable to determine. Patient agree's with this plan and has no further questions    Skin cancer screening  See dermatology.    Patient has been advised of split billing requirements and indicates understanding: Yes        BMI  Estimated body mass index is 38.39 kg/m  as calculated from the following:    Height as of this encounter: 1.702 m (5' 7\").    Weight as of this encounter: 111.2 kg (245 lb 2 oz). "       Counseling  Appropriate preventive services were addressed with this patient via screening, questionnaire, or discussion as appropriate for fall prevention, nutrition, physical activity, Tobacco-use cessation, social engagement, weight loss and cognition.  Checklist reviewing preventive services available has been given to the patient.  Reviewed patient's diet, addressing concerns and/or questions.   She is at risk for psychosocial distress and has been provided with information to reduce risk.           Prabhakar Ambrosio is a 29 year old, presenting for the following:  Physical          HPI      Dermatology conditions :  Patient is requesting for referral to see [facility] for ongoing care [facility].     Anxiety : She feels like most of her life since middle school she has had anxiety. She is currently seeing a therapist for a the last a couple of years. Since starting her new job she was hoping it would get better but it didn't. She is finally open to consider medication.  Has never been on any medication before.     Lesion in mouth : Patient noticed a bump or an ulcer in her mouth after her facial reconstruction surgery last year.  Since then the bump in her mouth has come down in size but it still somewhat bothersome.  It is not painful.  Does not drain any discharge.  No recent dental procedures.  No swelling of the lip or face.  She did have some complications from the surgery on her face with some inflammation infection that she was placed antibiotics on but this seems different.    Health Care Directive  Patient does not have a Health Care Directive: Discussed advance care planning with patient; however, patient declined at this time.      1/27/2025   General Health   How would you rate your overall physical health? (!) FAIR   Feel stress (tense, anxious, or unable to sleep) To some extent   (!) STRESS CONCERN      1/27/2025   Nutrition   Three or more servings  of calcium each day? (!) I DON'T KNOW   Diet: Vegetarian/vegan   How many servings of fruit and vegetables per day? (!) 2-3   How many sweetened beverages each day? 0-1         1/27/2025   Exercise   Days per week of moderate/strenous exercise 4 days   Average minutes spent exercising at this level 30 min         1/27/2025   Social Factors   Frequency of gathering with friends or relatives Twice a week   Worry food won't last until get money to buy more No   Food not last or not have enough money for food? No   Do you have housing? (Housing is defined as stable permanent housing and does not include staying ouside in a car, in a tent, in an abandoned building, in an overnight shelter, or couch-surfing.) Yes   Are you worried about losing your housing? No   Lack of transportation? No   Unable to get utilities (heat,electricity)? No         1/27/2025   Dental   Dentist two times every year? Yes         6/17/2024   TB Screening   Were you born outside of the [place]? No         Today's PHQ-2 Score:       1/27/2025    10:24 PM   PHQ-2 ( 1999 [employer])   Q1: Little interest or pleasure in doing things 0   Q2: Feeling down, depressed or hopeless 1   PHQ-2 Score 1    Q1: Little interest or pleasure in doing things Not at all   Q2: Feeling down, depressed or hopeless Several days   PHQ-2 Score 1       Patient-reported           1/27/2025   Substance Use   Alcohol more than 3/day or more than 7/wk No   Do you use any other substances recreationally? No     Social History     Tobacco Use    Smoking status: Never    Smokeless tobacco: Never   Vaping Use    Vaping status: Never Used   Substance Use Topics    Alcohol use: Yes     Comment: Socially 1-2 drinks a week    Drug use: Never           4/13/2023   LAST FHS-7 RESULTS   1st degree relative breast or ovarian cancer No   Any relative bilateral breast cancer No   Any male have breast cancer No   Any ONE woman have BOTH breast AND ovarian cancer No   Any woman with breast cancer  "before 50yrs No   2 or more relatives with breast AND/OR ovarian cancer No   2 or more relatives with breast AND/OR bowel cancer No        Mammogram Screening - Patient under 40 years of age: Routine Mammogram Screening not recommended.         1/27/2025   STI Screening   New sexual partner(s) since last STI/HIV test? No     History of abnormal Pap smear: No - age 21-29 PAP every 3 years recommended        6/18/2024     3:42 PM   PAP / HPV   PAP Negative for Intraepithelial Lesion or Malignancy (NILM)            1/27/2025   Contraception/Family Planning   Questions about contraception or family planning No        Reviewed and updated as needed this visit by Provider   Tobacco   Meds  Problems   Surg Hx  Fam Hx                  Review of Systems  Constitutional, HEENT, cardiovascular, pulmonary, GI, , musculoskeletal, neuro, skin, endocrine and psych systems are negative, except as otherwise noted.     Objective    Exam  /85   Pulse 71   Temp 98.1  F (36.7  C) (Oral)   Resp 18   Ht 1.702 m (5' 7\")   Wt 111.2 kg (245 lb 2 oz)   LMP 01/16/2025 (Exact Date)   SpO2 97%   BMI 38.39 kg/m     Estimated body mass index is 38.39 kg/m  as calculated from the following:    Height as of this encounter: 1.702 m (5' 7\").    Weight as of this encounter: 111.2 kg (245 lb 2 oz).    Physical Exam  GENERAL: alert and no distress  EYES: Eyes grossly normal to inspection, PERRL and conjunctivae and sclerae normal  HENT: normal cephalic/atraumatic, ear canals and TM's normal, nose and mouth without ulcers or lesions, and inside upper left lip along the gum boarder has a clear vesicle or mucosal lesion, approximately 3 mm in size   NECK: no adenopathy, no asymmetry, masses, or scars  RESP: lungs clear to auscultation - no rales, rhonchi or wheezes  CV: regular rate and rhythm, normal S1 S2, no S3 or S4, no murmur, click or rub, no peripheral edema  ABDOMEN: soft, nontender, no hepatosplenomegaly, no masses and bowel " sounds normal  MS: no gross musculoskeletal defects noted, no edema  SKIN: no suspicious lesions or rashes  NEURO: Normal strength and tone, mentation intact and speech normal  PSYCH: mentation appears normal, affect normal/bright        Signed Electronically by: CATE Calles

## 2025-01-28 NOTE — PATIENT INSTRUCTIONS
Patient Education   Preventive Care Advice   This is general advice given by our system to help you stay healthy. However, your care team may have specific advice just for you. Please talk to your care team about your preventive care needs.  Nutrition  Eat 5 or more servings of fruits and vegetables each day.  Try wheat bread, brown rice and whole grain pasta (instead of white bread, rice, and pasta).  Get enough calcium and vitamin D. Check the label on foods and aim for 100% of the RDA (recommended daily allowance).  Lifestyle  Exercise at least 150 minutes each week  (30 minutes a day, 5 days a week).  Do muscle strengthening activities 2 days a week. These help control your weight and prevent disease.  No smoking.  Wear sunscreen to prevent skin cancer.  Have a dental exam and cleaning every 6 months.  Yearly exams  See your health care team every year to talk about:  Any changes in your health.  Any medicines your care team has prescribed.  Preventive care, family planning, and ways to prevent chronic diseases.  Shots (vaccines)   HPV shots (up to age 26), if you've never had them before.  Hepatitis B shots (up to age 59), if you've never had them before.  COVID-19 shot: Get this shot when it's due.  Flu shot: Get a flu shot every year.  Tetanus shot: Get a tetanus shot every 10 years.  Pneumococcal, hepatitis A, and RSV shots: Ask your care team if you need these based on your risk.  Shingles shot (for age 50 and up)  General health tests  Diabetes screening:  Starting at age 35, Get screened for diabetes at least every 3 years.  If you are younger than age 35, ask your care team if you should be screened for diabetes.  Cholesterol test: At age 39, start having a cholesterol test every 5 years, or more often if advised.  Bone density scan (DEXA): At age 50, ask your care team if you should have this scan for osteoporosis (brittle bones).  Hepatitis C: Get tested at least once in your life.  STIs (sexually  transmitted infections)  Before age 24: Ask your care team if you should be screened for STIs.  After age 24: Get screened for STIs if you're at risk. You are at risk for STIs (including HIV) if:  You are sexually active with more than one person.  You don't use condoms every time.  You or a partner was diagnosed with a sexually transmitted infection.  If you are at risk for HIV, ask about PrEP medicine to prevent HIV.  Get tested for HIV at least once in your life, whether you are at risk for HIV or not.  Cancer screening tests  Cervical cancer screening: If you have a cervix, begin getting regular cervical cancer screening tests starting at age 21.  Breast cancer scan (mammogram): If you've ever had breasts, begin having regular mammograms starting at age 40. This is a scan to check for breast cancer.  Colon cancer screening: It is important to start screening for colon cancer at age 45.  Have a colonoscopy test every 10 years (or more often if you're at risk) Or, ask your provider about stool tests like a FIT test every year or Cologuard test every 3 years.  To learn more about your testing options, visit:   .  For help making a decision, visit:   https://bit.ly/gv83120.  Prostate cancer screening test: If you have a prostate, ask your care team if a prostate cancer screening test (PSA) at age 55 is right for you.  Lung cancer screening: If you are a current or former smoker ages 50 to 80, ask your care team if ongoing lung cancer screenings are right for you.  For informational purposes only. Not to replace the advice of your health care provider. Copyright   2023 [facility]. All rights reserved. Clinically reviewed by the [facility] Transitions Program. [employer] 142392 - REV 01/24.  Learning About Stress  What is stress?     Stress is your body's response to a hard situation. Your body can have a physical, emotional, or mental response. Stress is a fact of life for most people, and it  affects everyone differently. What causes stress for you may not be stressful for someone else.  A lot of things can cause stress. You may feel stress when you go on a job interview, take a test, or run a race. This kind of short-term stress is normal and even useful. It can help you if you need to work hard or react quickly. For example, stress can help you finish an important job on time.  Long-term stress is caused by ongoing stressful situations or events. Examples of long-term stress include long-term health problems, ongoing problems at work, or conflicts in your family. Long-term stress can harm your health.  How does stress affect your health?  When you are stressed, your body responds as though you are in danger. It makes hormones that speed up your heart, make you breathe faster, and give you a burst of energy. This is called the fight-or-flight stress response. If the stress is over quickly, your body goes back to normal and no harm is done.  But if stress happens too often or lasts too long, it can have bad effects. Long-term stress can make you more likely to get sick, and it can make symptoms of some diseases worse. If you tense up when you are stressed, you may develop neck, shoulder, or low back pain. Stress is linked to high blood pressure and heart disease.  Stress also harms your emotional health. It can make you taveras, tense, or depressed. Your relationships may suffer, and you may not do well at work or school.  What can you do to manage stress?  You can try these things to help manage stress:   Do something active. Exercise or activity can help reduce stress. Walking is a great way to get started. Even everyday activities such as housecleaning or yard work can help.  Try yoga or juliano chi. These techniques combine exercise and meditation. You may need some training at first to learn them.  Do something you enjoy. For example, listen to music or go to a movie. Practice your hobby or do volunteer  "work.  Meditate. This can help you relax, because you are not worrying about what happened before or what may happen in the future.  Do guided imagery. Imagine yourself in any setting that helps you feel calm. You can use online videos, books, or a teacher to guide you.  Do breathing exercises. For example:  From a standing position, bend forward from the waist with your knees slightly bent. Let your arms dangle close to the floor.  Breathe in slowly and deeply as you return to a standing position. Roll up slowly and lift your head last.  Hold your breath for just a few seconds in the standing position.  Breathe out slowly and bend forward from the waist.  Let your feelings out. Talk, laugh, cry, and express anger when you need to. Talking with supportive friends or family, a counselor, or a jose david leader about your feelings is a healthy way to relieve stress. Avoid discussing your feelings with people who make you feel worse.  Write. It may help to write about things that are bothering you. This helps you find out how much stress you feel and what is causing it. When you know this, you can find better ways to cope.  What can you do to prevent stress?  You might try some of these things to help prevent stress:  Manage your time. This helps you find time to do the things you want and need to do.  Get enough sleep. Your body recovers from the stresses of the day while you are sleeping.  Get support. Your family, friends, and community can make a difference in how you experience stress.  Limit your news feed. Avoid or limit time on social media or news that may make you feel stressed.  Do something active. Exercise or activity can help reduce stress. Walking is a great way to get started.  Where can you learn more?  Go to https://www.[employer].net/patiented  Enter N032 in the search box to learn more about \"Learning About Stress.\"  Current as of: October 24, 2023  Content Version: 14.3    2024 [employer]. "   Care instructions adapted under license by your healthcare professional. If you have questions about a medical condition or this instruction, always ask your healthcare professional. [employer] disclaims any warranty or liability for your use of this information.

## 2025-01-30 ENCOUNTER — MYC MEDICAL ADVICE (OUTPATIENT)
Dept: DERMATOLOGY | Facility: CLINIC | Age: 30
End: 2025-01-30
Payer: COMMERCIAL

## 2025-01-30 NOTE — TELEPHONE ENCOUNTER
Denice Mclean RN  Yanni Arellano! I think pt is OK to keep her appts how they are... she is scheduled with both Dr Marin and Dr Huston in April based on the two referrals placed and is following up with Plastic surgery for the Spindle cell type atypical fibroxanthoma. Thanks!          Previous Messages       ----- Message -----  From: Yanni Arellano  Sent: 1/29/2025   8:14 AM CST  To: [facility]    Hey,    This patient's FP doctor referred her to Derm Surg to see Dr. Huston. I'm not sure what the plan was there.    Pt is already seeing Dr. Huston for follow up in April. Would you take a look at the referral and confirm if pt should be seen sooner than April? I can let the patient know if we're just leaving her appt as is.    Vern Carpenter  1/29/2025 8:10 AM

## 2025-02-24 ENCOUNTER — OFFICE VISIT (OUTPATIENT)
Dept: PLASTIC SURGERY | Facility: CLINIC | Age: 30
End: 2025-02-24
Payer: COMMERCIAL

## 2025-02-24 DIAGNOSIS — D23.9 FIBROUS HISTIOCYTOMA: Primary | ICD-10-CM

## 2025-02-24 NOTE — PROGRESS NOTES
Facial Plastic and Reconstructive Surgery        Daily Vyas is a 29 year old female s/p the following procedures:   1.    LEFT midfacial implant on 2/16/24.   2.   In-office dermabrasion and left cheek scar revision (5/16/24).     Since surgery, she has had issues with intermittent swelling and erythema requiring antibiotics.      Today, she reports she is doing well. Using silicone. Had some swelling in December and we started her on antibiotics. She reports that she had a canker sore in the region of left intraoral incision that her PCP thought was a mucocele.      On exam, the inferior incision is erythematous and appears somewhat widened and depressed, improved some from prior. Normal firmness to underlying scar tissue. The intraoral incision is intact, no fistula or opening. I do not see any evidence of ulceration or mucocele formation.      A/P: She is now 9 months out from her in office dermabrasion and scar revision. She was on multiple rounds of antibiotics postoperatively due to some concern for infection.   Today, she is doing well. The scar is stable. We discussed a re-excision of the area which she is not thrilled about. She will think about it. If she wants to do it I would not charge her for her and would not put it through insurance. We will have to think about what suture to use for her. She may want to do this in the Fall.     I encouraged her to call right away if she has any recurrence of the oral lesion. There is some concern it could have been a fistula and that was the cause of her swelling. We will just have to watch it.      I would like to see her back at the end of the Summer and we can discuss potential scar revision at that time, sooner if there are issues.      I spent a total of 20 minutes in the care of patient Daily Vyas during today's office visit. This time includes reviewing the patient's chart and prior history, obtaining a history, performing an examination and  evaluation and counseling the patient. This time also includes ordering mediations or tests necessary in addition to communication to other member's of the patient's health care team. Time spent in documentation and care coordination is included.

## 2025-03-03 ENCOUNTER — VIRTUAL VISIT (OUTPATIENT)
Dept: FAMILY MEDICINE | Facility: CLINIC | Age: 30
End: 2025-03-03
Attending: PHYSICIAN ASSISTANT
Payer: COMMERCIAL

## 2025-03-03 DIAGNOSIS — F41.9 ANXIETY: ICD-10-CM

## 2025-03-03 PROCEDURE — 98005 SYNCH AUDIO-VIDEO EST LOW 20: CPT | Performed by: PHYSICIAN ASSISTANT

## 2025-03-03 RX ORDER — FLUOXETINE 10 MG/1
10 CAPSULE ORAL DAILY
Qty: 90 CAPSULE | Refills: 0 | Status: SHIPPED | OUTPATIENT
Start: 2025-03-03

## 2025-03-03 ASSESSMENT — ANXIETY QUESTIONNAIRES
1. FEELING NERVOUS, ANXIOUS, OR ON EDGE: MORE THAN HALF THE DAYS
8. IF YOU CHECKED OFF ANY PROBLEMS, HOW DIFFICULT HAVE THESE MADE IT FOR YOU TO DO YOUR WORK, TAKE CARE OF THINGS AT HOME, OR GET ALONG WITH OTHER PEOPLE?: SOMEWHAT DIFFICULT
GAD7 TOTAL SCORE: 7
4. TROUBLE RELAXING: SEVERAL DAYS
2. NOT BEING ABLE TO STOP OR CONTROL WORRYING: MORE THAN HALF THE DAYS
6. BECOMING EASILY ANNOYED OR IRRITABLE: SEVERAL DAYS
IF YOU CHECKED OFF ANY PROBLEMS ON THIS QUESTIONNAIRE, HOW DIFFICULT HAVE THESE PROBLEMS MADE IT FOR YOU TO DO YOUR WORK, TAKE CARE OF THINGS AT HOME, OR GET ALONG WITH OTHER PEOPLE: SOMEWHAT DIFFICULT
7. FEELING AFRAID AS IF SOMETHING AWFUL MIGHT HAPPEN: NOT AT ALL
7. FEELING AFRAID AS IF SOMETHING AWFUL MIGHT HAPPEN: NOT AT ALL
5. BEING SO RESTLESS THAT IT IS HARD TO SIT STILL: NOT AT ALL
3. WORRYING TOO MUCH ABOUT DIFFERENT THINGS: SEVERAL DAYS

## 2025-03-03 ASSESSMENT — PATIENT HEALTH QUESTIONNAIRE - PHQ9
SUM OF ALL RESPONSES TO PHQ QUESTIONS 1-9: 6
SUM OF ALL RESPONSES TO PHQ QUESTIONS 1-9: 6
10. IF YOU CHECKED OFF ANY PROBLEMS, HOW DIFFICULT HAVE THESE PROBLEMS MADE IT FOR YOU TO DO YOUR WORK, TAKE CARE OF THINGS AT HOME, OR GET ALONG WITH OTHER PEOPLE: SOMEWHAT DIFFICULT

## 2025-03-03 NOTE — PROGRESS NOTES
"Dialy is a 29 year old who is being evaluated via a billable telephone visit.    How would you like to obtain your AVS? MyChart  If the video visit is dropped, the invitation should be resent by: Text to cell phone: [phone]  Will anyone else be joining your video visit? No  Originating Location (pt. Location): Home    Distant Location (provider location):  Off-site      Assessment & Plan     Anxiety  Chronic, some improvement with medication. No significant side effects. Since on average patient is doing well, shared decision making was done to continue with this medication and dosage for another 3 months and recheck again. Follow up sooner if needed. Patient agree's with this plan and has no further questions  - FLUoxetine (PROZAC) 10 MG capsule; Take 1 capsule (10 mg) by mouth daily.          BMI  Estimated body mass index is 38.39 kg/m  as calculated from the following:    Height as of 1/28/25: 1.702 m (5' 7\").    Weight as of 1/28/25: 111.2 kg (245 lb 2 oz).             Subjective   Daily is a 29 year old, presenting for the following health issues:  Anxiety        3/3/2    3:51 PM   Additional Questions   Roomed by Kalani   Accompanied by none         3/3/2025     3:51 PM   Patient Reported Additional Medications   Patient reports taking the following new medications none     History of Present Illness       Mental Health Follow-up:  Patient presents to follow-up on Anxiety.    Patient's anxiety since last visit has been:  Medium  The patient is not having other symptoms associated with anxiety.  Any significant life events: job concerns  Patient is feeling anxious or having panic attacks.  Patient has no concerns about alcohol or drug use.       Anxiety : Chronic, was worsening in the last year. Recently started on Prozac. Since starting medication she has not noticed any significant side effects. A few days of GI upset. She has not noticed a ton of differences in the last month but on averaging " she has been having more good days than bad days. She is sleeping a lot better and feels like that has helped with her mental health          3/3/2025     3:32 PM   PHQ   PHQ-9 Total Score 6    Q9: Thoughts of better off dead/self-harm past 2 weeks Not at all       Patient-reported          1/28/2025     4:45 PM 3/3/2025     3:34 PM   LUX-7 SCORE   Total Score  7 (mild anxiety)   Total Score 11 7        Patient-reported                Review of Systems  Constitutional, HEENT, cardiovascular, pulmonary, gi and gu systems are negative, except as otherwise noted.      Objective           Vitals:  No vitals were obtained today due to virtual visit.    Physical Exam   General: Alert and no distress //Respiratory: No audible wheeze, cough, or shortness of breath // Psychiatric:  Appropriate affect, tone, and pace of words            Virtual Visit device : Cecilia    Signed Electronically by: CATE Calles

## 2025-04-06 ENCOUNTER — DOCUMENTATION ONLY (OUTPATIENT)
Dept: PLASTIC SURGERY | Facility: CLINIC | Age: 30
End: 2025-04-06

## 2025-04-06 ENCOUNTER — TELEPHONE (OUTPATIENT)
Dept: PLASTIC SURGERY | Facility: CLINIC | Age: 30
End: 2025-04-06

## 2025-04-06 DIAGNOSIS — D23.9 FIBROUS HISTIOCYTOMA: ICD-10-CM

## 2025-04-06 DIAGNOSIS — B99.9 INFECTION: Primary | ICD-10-CM

## 2025-04-06 DIAGNOSIS — Z98.890 POSTOPERATIVE STATE: ICD-10-CM

## 2025-04-06 NOTE — PROGRESS NOTES
Daily called in to report new onset swelling to the left cheek and left premaxillary gingivobuccal sulcus consistent with previous episodes of swelling and infection. She sent in photos demonstrating what looked like an abscess within the left gingivobuccal sulcus and an in-office visit was arranged. She reports that Friday evening she noticed pain in her gums similar to a kanker sore. This remained constant through Saturday until she woke up this AM with global left cheek swelling and a skin pustule within the left nasolabial fold incision. Prior to being seen, she reports that about an hour ago she noticed a foul tasting fluid in her left mouth and believes the abscess may have opened up.    Exam:  The left gingivobuccal sulcus has an inflamed appearing ulcer with purulence actively draining from it. The left nasolabial fold incision had a small white pustule which was unroofed and probed. This was not noted to be draining any purulence and I do not think it communicates with the deeper abscess. A culture was taken of the purulent material draining from the GB sulcus intraorally.    Assessment/Plan: Daily is s/p placement of a left midfacial implant who presents with an intraoral abscess that is likely communicating with the implant. Given her history, it is possible that the implant itself is colonized with a biofilm. I prescribed her a 10 day course of augmentin. The abscess today is self draining and I encouraged further massage and warm compress. Dr. Manuel's team will reach out to schedule a follow-up visit and follow-up on the culture data.    Evangelista Mg MD  Fellow, Facial Plastic and Reconstructive Surgery

## 2025-04-07 DIAGNOSIS — B99.9 INFECTION: Primary | ICD-10-CM

## 2025-04-07 PROCEDURE — 99000 SPECIMEN HANDLING OFFICE-LAB: CPT | Performed by: PATHOLOGY

## 2025-04-07 PROCEDURE — 87075 CULTR BACTERIA EXCEPT BLOOD: CPT | Performed by: STUDENT IN AN ORGANIZED HEALTH CARE EDUCATION/TRAINING PROGRAM

## 2025-04-07 PROCEDURE — 87070 CULTURE OTHR SPECIMN AEROBIC: CPT | Performed by: STUDENT IN AN ORGANIZED HEALTH CARE EDUCATION/TRAINING PROGRAM

## 2025-04-07 NOTE — PROGRESS NOTES
Facial Plastic and Reconstructive Surgery        Daily Vyas is a 29 year old female s/p the following procedures:   1.    LEFT midfacial implant on 2/16/24.   2.   In-office dermabrasion and left cheek scar revision (5/16/24).      Since surgery, she has had issues with intermittent swelling and erythema requiring antibiotics. She had been doing well but unfortunately over the weekend got redness and swelling and purulence from the intraoral incision site. She reports that Friday evening (4 days ago) she noticed pain in her gums similar to a kanker sore and similar to the episode she had in December. This remained constant through Saturday until she woke up Sunday morning with left cheek swelling and a skin pustule within the left nasolabial fold incision. She had a foul smelling taste in her mouth, likely the abscess draining from the intraoral site. She saw Dr. Mg, who took a culture and started her on antibiotics. Today, she reports she may have gotten a small amount more of pus out. She has been massaging. She feels some of the swelling and redness has gone down a bit.     On exam, she has a crusted over pustule overlying the NLF incisions. Intraorally, there is an area of the gingivobuccal sulcus incision that appears red and irritated. I am not able to express any purulence but this is tender for her.     A/P: She is now 9 months out from her in office dermabrasion and scar revision. She was on multiple rounds of antibiotics postoperatively due to some concern for infection. She developed colleen purulence from the intraoral incision site over the weekend. I have a high level of suspicion for a biofilm on the implant and think it needs to be removed. This is very upsetting for Daily which is completely understandable. It took her a long time to get to the point where we decided to place and implant and one of her biggest concerns was an infection. I discussed that we could potentially go to the  operating room to wash it out and see if we could salvage the implant but there are no guarantees here. She is here today with her mom and I would like them to go home and think about the options as all of this was a lot to handle today. I will touch base with Daily on Friday and we will talk again about options going forward.     Initial cultures are returning as follows:   Culture in progress   2+ Raoultella ornithinolytica/planticola Abnormal    Identification is preliminary, confirmation in progress   4+ Enterococcus faecalis Abnormal      She is currently on Augmentin. We will continue to follow the culture results and adjust antibiotics as needed.     Kelly Manuel MD     I spent a total of 25 minutes in the care of patient Daily Vyas during today's office visit. This time includes reviewing the patient's chart and prior history, obtaining a history, performing an examination and evaluation and counseling the patient. This time also includes ordering mediations or tests necessary in addition to communication to other member's of the patient's health care team. Time spent in documentation and care coordination is included.

## 2025-04-08 ENCOUNTER — OFFICE VISIT (OUTPATIENT)
Dept: PLASTIC SURGERY | Facility: CLINIC | Age: 30
End: 2025-04-08
Payer: COMMERCIAL

## 2025-04-08 DIAGNOSIS — B99.9 INFECTION: Primary | ICD-10-CM

## 2025-04-08 LAB — BACTERIA FLD CULT: NORMAL

## 2025-04-09 LAB
BACTERIA ABSC ANAEROBE+AEROBE CULT: ABNORMAL
GRAM STAIN RESULT: ABNORMAL

## 2025-04-10 DIAGNOSIS — Z98.890 POSTOPERATIVE STATE: Primary | ICD-10-CM

## 2025-04-10 RX ORDER — CEFPODOXIME PROXETIL 200 MG/1
200 TABLET, FILM COATED ORAL 2 TIMES DAILY
Qty: 28 TABLET | Refills: 0 | Status: SHIPPED | OUTPATIENT
Start: 2025-04-10 | End: 2025-04-24

## 2025-04-15 ENCOUNTER — PREP FOR PROCEDURE (OUTPATIENT)
Dept: OTOLARYNGOLOGY | Facility: CLINIC | Age: 30
End: 2025-04-15
Payer: COMMERCIAL

## 2025-04-15 DIAGNOSIS — S00.85XA: ICD-10-CM

## 2025-04-15 DIAGNOSIS — M95.2 ACQUIRED FACIAL DEFORMITY: ICD-10-CM

## 2025-04-15 DIAGNOSIS — B99.9 INFECTION: Primary | ICD-10-CM

## 2025-04-15 DIAGNOSIS — L08.9: ICD-10-CM

## 2025-04-15 RX ORDER — CEFAZOLIN SODIUM 2 G/50ML
2 SOLUTION INTRAVENOUS SEE ADMIN INSTRUCTIONS
OUTPATIENT
Start: 2025-04-15

## 2025-04-15 RX ORDER — CEFAZOLIN SODIUM 2 G/50ML
2 SOLUTION INTRAVENOUS
OUTPATIENT
Start: 2025-04-15

## 2025-04-17 ENCOUNTER — TELEPHONE (OUTPATIENT)
Dept: FAMILY MEDICINE | Facility: CLINIC | Age: 30
End: 2025-04-17

## 2025-04-17 ENCOUNTER — OFFICE VISIT (OUTPATIENT)
Dept: FAMILY MEDICINE | Facility: CLINIC | Age: 30
End: 2025-04-17
Payer: COMMERCIAL

## 2025-04-17 VITALS
HEIGHT: 68 IN | DIASTOLIC BLOOD PRESSURE: 85 MMHG | OXYGEN SATURATION: 98 % | RESPIRATION RATE: 20 BRPM | TEMPERATURE: 98.4 F | BODY MASS INDEX: 37.19 KG/M2 | HEART RATE: 78 BPM | SYSTOLIC BLOOD PRESSURE: 136 MMHG | WEIGHT: 245.38 LBS

## 2025-04-17 DIAGNOSIS — Z01.818 PREOP GENERAL PHYSICAL EXAM: Primary | ICD-10-CM

## 2025-04-17 DIAGNOSIS — F41.9 ANXIETY: ICD-10-CM

## 2025-04-17 DIAGNOSIS — Z82.49 FAMILY HISTORY OF BLOOD CLOTS: ICD-10-CM

## 2025-04-17 DIAGNOSIS — S00.85XD: ICD-10-CM

## 2025-04-17 DIAGNOSIS — L08.9: ICD-10-CM

## 2025-04-17 LAB — HGB BLD-MCNC: 14.1 G/DL (ref 11.7–15.7)

## 2025-04-17 ASSESSMENT — PAIN SCALES - GENERAL: PAINLEVEL_OUTOF10: NO PAIN (0)

## 2025-04-17 NOTE — PATIENT INSTRUCTIONS

## 2025-04-17 NOTE — PROGRESS NOTES
Preoperative Evaluation  [address] [address]  Phone: [phone]  Fax: [phone]  Primary Provider: Angeline Griffin DO  Pre-op Performing Provider: CATE Calles  Apr 17, 2025 4/16/2025   Surgical Information   What procedure is being done? Outpatient Procedure: Washout of infected left facial implant with possible removal of implant.   Facility or Hospital where procedure/surgery will be performed: [facility]   Who is doing the procedure / surgery? Dr Kelly Manuel   Date of surgery / procedure: 4/24/25   Time of surgery / procedure: 9:45   Where do you plan to recover after surgery? at home with family     Fax number for surgical facility: [phone]      Assessment & Plan     The proposed surgical procedure is considered INTERMEDIATE risk.    Preop general physical exam  Cleared for procedure  - Hemoglobin; Future  - Adult Gen Surg  Referral; Future  - Hemoglobin    Family history of blood clots  Mom has factor 5    Anxiety  Chronic, worsening in the last month with new health concerns. Shared decision making was done to increase medication to 20 mg. Follow up in 4 weeks to recheck .Patient agree's with this plan and has no further questions  - FLUoxetine (PROZAC) 20 MG capsule; Take 1 capsule (20 mg) by mouth daily.    Foreign body of face, superficial, infected, subsequent encounter  Surgery schedule for implant to be cleaned and possibly replaced or removed. Patient is still deciding.             - No identified additional risk factors other than previously addressed    Preoperative Medication Instructions  Antiplatelet or Anticoagulation Medication Instructions   - We reviewed the medication list and the patient is not on an antiplatelet or anticoagulation medications.    Additional Medication Instructions   - Diuretics (furosemide, hydrochlorothiazide, chlorothalidone): DO NOT TAKE on  the day of surgery.  - HOLD Minoxidil  - hold NSAID's 1 week prior   - HOLD all ointment and creams morning of     Recommendation  Approval given to proceed with proposed procedure, without further diagnostic evaluation.        Prabhakar Ambrosio is a 29 year old, presenting for the following:  Pre-Op Exam (/)        HPI: She had an implant placed in her left cheek after a tumor was removed.   Over  the last year she has been struggling with swelling and recurrent possible infections. She got a sore in her mouth which was swabbed and confirmed bacterial infection. Surgeon was concerned and wants to go in and clean out the implanted and get it replaced.     Anxiety : Chronic, worsening in the last month because of this new infection of her implant. She is struggling with having to go backwards with all the work she has done on her implant. She is sad this is the out come. She is worried about the procedure itself. She is distracted in the last few weeks and are to concentrated because of her mental health. She does see a therapist.         4/16/2025   Pre-Op Questionnaire   Have you ever had a heart attack or stroke? No   Have you ever had surgery on your heart or blood vessels, such as a stent placement, a coronary artery bypass, or surgery on an artery in your head, neck, heart, or legs? No   Do you have chest pain with activity? No   Do you have a history of heart failure? No   Do you currently have a cold, bronchitis or symptoms of other infection? No    Do you have a cough, shortness of breath, or wheezing? No   Do you or anyone in your family have previous history of blood clots? (!) YES - mom has Factor 5    Do you or does anyone in your family have a serious bleeding problem such as prolonged bleeding following surgeries or cuts? No   Have you ever had problems with anemia or been told to take iron pills? No   Have you had any abnormal blood loss such as black, tarry or bloody stools, or abnormal vaginal  bleeding? No   Have you ever had a blood transfusion? No   Are you willing to have a blood transfusion if it is medically needed before, during, or after your surgery? Yes   Have you or any of your relatives ever had problems with anesthesia? No   Do you have sleep apnea, excessive snoring or daytime drowsiness? No   Do you have any artifical heart valves or other implanted medical devices like a pacemaker, defibrillator, or continuous glucose monitor? No   Do you have artificial joints? No   Are you allergic to latex? No     Advance Care Planning    Discussed advance care planning with patient; however, patient declined at this time.    Preoperative Review of    reviewed - no record of controlled substances prescribed.      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     Priority: Medium    Encounter for contraceptive management 12/15/2020     Priority: Medium    Low iron stores 01/06/2018     Priority: Medium    Alopecia areata 12/22/2017     Priority: Medium    Dermatitis, seborrheic 09/30/2017     Priority: Medium    Family history of blood clots 08/13/2015     Priority: Medium    Acne 08/23/2013     Priority: Medium    Seasonal allergies 08/23/2013     Priority: Medium      Past Medical History:   Diagnosis Date    Alopecia     Cancer (H)     Eczema     Nickel allergy     identified by allergy to belt buckle    Obese     Uncomplicated asthma      Past Surgical History:   Procedure Laterality Date    BIOPSY      HEAD & NECK SURGERY      OPEN REDUCTION INTERNAL FIXATION ZYGOMA Left 2/16/2024    Procedure: Midfacial implant placement to left face;  Surgeon: Kelly Manuel MD;  Location: [facility] OR     Current Outpatient Medications   Medication Sig Dispense Refill    chlorhexidine (PERIDEX) 0.12 % solution SWISH AND SPIT 15 MLS IN MOUTH 2 TIMES DAILY FOR 14 DAYS      clobetasol propionate  "(CLOBEX) 0.05 % external shampoo APPLY TO DRY SCALP, LEAVE ON 10 MINS THEN LATHER & RINSE. APPLY 1-2 TIMES WEEKLY. 118 mL 3    FLUoxetine (PROZAC) 10 MG capsule Take 1 capsule (10 mg) by mouth daily. 90 capsule 0    ketoconazole (NIZORAL) 2 % external shampoo APPLY & LATHER ONTO DAMP SCALP & LEAVE ON 3-5MIN FOR 3 TIMES WEEKLY 120 mL 11    minoxidil (LONITEN) 2.5 MG tablet Take 1/2 tablet once daily (1.25 mg) 60 tablet 3    norgestim-eth estrad triphasic (ORTHO TRI-CYCLEN LO) 0.18/0.215/0.25 MG-25 MCG tablet Take 1 tablet by mouth daily. 84 tablet 3    ondansetron (ZOFRAN ODT) 4 MG ODT tab Take 1 tablet (4 mg) by mouth every 8 hours as needed for nausea. 20 tablet 0    spironolactone (ALDACTONE) 50 MG tablet Take 3 tablets (150 mg) by mouth daily 270 tablet 1    triamcinolone (KENALOG) 0.1 % ointment Apply topically 2 times daily To ears for 2-3 weeks, then a few times per week for maintenance. 80 g 3    cefpodoxime (VANTIN) 200 MG tablet Take 1 tablet (200 mg) by mouth 2 times daily for 14 days. 28 tablet 0       Allergies   Allergen Reactions    Fexofenadine Other (See Comments)     Bloody nose  Other reaction(s): Bloody Nose  Other reaction(s): Bloody Nose        Social History     Tobacco Use    Smoking status: Never     Passive exposure: Never    Smokeless tobacco: Never   Substance Use Topics    Alcohol use: Yes     Comment: Socially 1-2 drinks a week       History   Drug Use Unknown             Review of Systems  Constitutional, HEENT, cardiovascular, pulmonary, GI, , musculoskeletal, neuro, skin, endocrine and psych systems are negative, except as otherwise noted.    Objective    /85   Pulse 78   Temp 98.4  F (36.9  C) (Oral)   Resp 20   Ht 1.715 m (5' 7.52\")   Wt 111.3 kg (245 lb 6 oz)   LMP 03/31/2025 (Approximate)   SpO2 98%   BMI 37.84 kg/m     Estimated body mass index is 37.84 kg/m  as calculated from the following:    Height as of this encounter: 1.715 m (5' 7.52\").    Weight as of this " encounter: 111.3 kg (245 lb 6 oz).  Physical Exam  GENERAL: alert and no distress  EYES: Eyes grossly normal to inspection, PERRL and conjunctivae and sclerae normal  HENT: ear canals and TM's normal, nose and mouth without ulcers or lesions  NECK: no adenopathy, no asymmetry, masses, or scars  RESP: lungs clear to auscultation - no rales, rhonchi or wheezes  CV: regular rate and rhythm, normal S1 S2, no S3 or S4, no murmur, click or rub, no peripheral edema  ABDOMEN: soft, nontender, no hepatosplenomegaly, no masses and bowel sounds normal  MS: no gross musculoskeletal defects noted, no edema  SKIN: no suspicious lesions or rashes  NEURO: Normal strength and tone, mentation intact and speech normal  PSYCH: mentation appears normal, affect normal/bright    Recent Labs   Lab Test 06/18/24  1600      POTASSIUM 4.1   CR 0.79        Diagnostics  Recent Results (from the past 24 hours)   Hemoglobin    Collection Time: 04/17/25 10:49 AM   Result Value Ref Range    Hemoglobin 14.1 11.7 - 15.7 g/dL      No EKG required, no history of coronary heart disease, significant arrhythmia, peripheral arterial disease or other structural heart disease.    Revised Cardiac Risk Index (RCRI)  The patient has the following serious cardiovascular risks for perioperative complications:   - No serious cardiac risks = 0 points     RCRI Interpretation: 0 points: Class I (very low risk - 0.4% complication rate)     The longitudinal plan of care for the diagnosis(es)/condition(s) as documented were addressed during this visit. Due to the added complexity in care, I will continue to support Daily in the subsequent management and with ongoing continuity of care.    Signed Electronically by: CATE Calles  A copy of this evaluation report is provided to the requesting physician.

## 2025-04-17 NOTE — TELEPHONE ENCOUNTER
Order/Referral Request    Who is requesting: kamille /surgery center -[facility] needs this to cover     Orders being requested: referral to be placed to the surgery center so that her surgery can be covered through her insurance     Reason service is needed/diagnosis: NA    When are orders needed by: before 4/24 surgery is scheduled this day     Has this been discussed with Provider: No    Does patient have a preference on a Group/Provider/Facility? [facility] -Dr.Jenan Manuel   Does patient have an appointment scheduled?: Yes: 4/24 with surgery center     Where to send orders: Place orders within Epic    Could we send this information to you in [employer] or would you prefer to receive a phone call?:   No preference   Okay to leave a detailed message?: Yes call kamille with any further questions

## 2025-04-22 DIAGNOSIS — Z98.890 POSTOPERATIVE STATE: Primary | ICD-10-CM

## 2025-04-22 RX ORDER — CHLORHEXIDINE GLUCONATE ORAL RINSE 1.2 MG/ML
15 SOLUTION DENTAL 2 TIMES DAILY
Qty: 420 ML | Refills: 0 | Status: SHIPPED | OUTPATIENT
Start: 2025-04-22 | End: 2025-05-06

## 2025-04-22 RX ORDER — CEFPODOXIME PROXETIL 100 MG/1
200 TABLET, FILM COATED ORAL 2 TIMES DAILY
Qty: 40 TABLET | Refills: 0 | Status: SHIPPED | OUTPATIENT
Start: 2025-04-22 | End: 2025-05-02

## 2025-04-22 RX ORDER — OXYCODONE HYDROCHLORIDE 5 MG/1
5 TABLET ORAL EVERY 6 HOURS PRN
Qty: 10 TABLET | Refills: 0 | Status: SHIPPED | OUTPATIENT
Start: 2025-04-22 | End: 2025-04-25

## 2025-04-24 ENCOUNTER — TELEPHONE (OUTPATIENT)
Dept: FAMILY MEDICINE | Facility: CLINIC | Age: 30
End: 2025-04-24
Payer: COMMERCIAL

## 2025-04-24 NOTE — TELEPHONE ENCOUNTER
Hello,  You recently entered a Derm Surg referral for this patient. This is being cancelled as this is not the appropriate referral. The patient must first see general dermatology through [facility] and then that provider will refer top Dr. Huston. There are no referrals directly to Dr. Huston.    Please place a general referral for alopecia and we will schedule in the next available opening            Lorena CASAS RN BSN  Patient Care Supervisor  [facility]- [phone]

## 2025-05-12 DIAGNOSIS — B99.9 INFECTION: Primary | ICD-10-CM

## 2025-05-18 ASSESSMENT — ANXIETY QUESTIONNAIRES
6. BECOMING EASILY ANNOYED OR IRRITABLE: SEVERAL DAYS
4. TROUBLE RELAXING: MORE THAN HALF THE DAYS
IF YOU CHECKED OFF ANY PROBLEMS ON THIS QUESTIONNAIRE, HOW DIFFICULT HAVE THESE PROBLEMS MADE IT FOR YOU TO DO YOUR WORK, TAKE CARE OF THINGS AT HOME, OR GET ALONG WITH OTHER PEOPLE: SOMEWHAT DIFFICULT
3. WORRYING TOO MUCH ABOUT DIFFERENT THINGS: MORE THAN HALF THE DAYS
1. FEELING NERVOUS, ANXIOUS, OR ON EDGE: MORE THAN HALF THE DAYS
5. BEING SO RESTLESS THAT IT IS HARD TO SIT STILL: SEVERAL DAYS
GAD7 TOTAL SCORE: 10
7. FEELING AFRAID AS IF SOMETHING AWFUL MIGHT HAPPEN: SEVERAL DAYS
GAD7 TOTAL SCORE: 10
8. IF YOU CHECKED OFF ANY PROBLEMS, HOW DIFFICULT HAVE THESE MADE IT FOR YOU TO DO YOUR WORK, TAKE CARE OF THINGS AT HOME, OR GET ALONG WITH OTHER PEOPLE?: SOMEWHAT DIFFICULT
7. FEELING AFRAID AS IF SOMETHING AWFUL MIGHT HAPPEN: SEVERAL DAYS
GAD7 TOTAL SCORE: 10
2. NOT BEING ABLE TO STOP OR CONTROL WORRYING: SEVERAL DAYS

## 2025-05-19 ENCOUNTER — VIRTUAL VISIT (OUTPATIENT)
Dept: FAMILY MEDICINE | Facility: CLINIC | Age: 30
End: 2025-05-19
Attending: PHYSICIAN ASSISTANT
Payer: COMMERCIAL

## 2025-05-19 DIAGNOSIS — F41.9 ANXIETY: ICD-10-CM

## 2025-05-19 PROCEDURE — 98005 SYNCH AUDIO-VIDEO EST LOW 20: CPT | Performed by: PHYSICIAN ASSISTANT

## 2025-05-19 NOTE — PROGRESS NOTES
"Daily is a 29 year old who is being evaluated via a billable video visit.    How would you like to obtain your AVS? MyChart  If the video visit is dropped, the invitation should be resent by: Text to cell phone: [phone]  Will anyone else be joining your video visit? No      Assessment & Plan     Anxiety  Chronic, finding the new dosage more effective. Shared decision making was done to keep the current dosage the same to continue to give it time and stability as she continues to manage her other health concerns. Follow up in 3 months to recheck.   - FLUoxetine (PROZAC) 20 MG capsule; Take 1 capsule (20 mg) by mouth daily.          BMI  Estimated body mass index is 37.84 kg/m  as calculated from the following:    Height as of 4/17/25: 1.715 m (5' 7.52\").    Weight as of 4/17/25: 111.3 kg (245 lb 6 oz).             Subjective   Daily is a 29 year old, presenting for the following health issues:  Recheck Medication        5/19/2025     2:25 PM   Additional Questions   Roomed by Kalani   Accompanied by none         5/19/2025     2:25 PM   Patient Reported Additional Medications   Patient reports taking the following new medications none     History of Present Illness       Mental Health Follow-up:  Patient presents to follow-up on Depression & Anxiety.Patient's depression since last visit has been:  Medium  The patient is not having other symptoms associated with depression.  Patient's anxiety since last visit has been:  Medium  The patient is not having other symptoms associated with anxiety.  Any significant life events: health concerns  Patient is feeling anxious or having panic attacks.  Patient has no concerns about alcohol or drug use.       Anxiety   How are you doing with your anxiety since your last visit? has been okay with the increase in medication. She is still able to go through her day to day  Are you having other symptoms that might be associated with anxiety? No  Have you had a significant " life event? Health Concerns and OTHER: recent facial surgery. Dealing with some facial swelling post-op  Are you feeling depressed? No  Do you have any concerns with your use of alcohol or other drugs? No  Additional provider notes :   Currently on Prozac 20 mg. She is doing better on this dosage than she thought she would be.         1/28/2025     4:45 PM 3/3/2025     3:34 PM 5/18/2025     8:51 PM   LUX-7 SCORE   Total Score  7 (mild anxiety) 10 (moderate anxiety)   Total Score 11 7  10        Patient-reported         3/3/2025     3:32 PM   PHQ   PHQ-9 Total Score 6    Q9: Thoughts of better off dead/self-harm past 2 weeks Not at all       Patient-reported           Review of Systems  Constitutional, HEENT, cardiovascular, pulmonary, gi and gu systems are negative, except as otherwise noted.      Objective           Vitals:  No vitals were obtained today due to virtual visit.    Physical Exam   GENERAL: alert and no distress  EYES: Eyes grossly normal to inspection.  No discharge or erythema, or obvious scleral/conjunctival abnormalities.  RESP: No audible wheeze, cough, or visible cyanosis.    SKIN: Visible skin clear. No significant rash, abnormal pigmentation or lesions.  NEURO: Cranial nerves grossly intact.  Mentation and speech appropriate for age.  PSYCH: Appropriate affect, tone, and pace of words          Video-Visit Details    Type of service:  Video Visit   Originating Location (pt. Location): Home    Distant Location (provider location):  Off-site  Platform used for Video Visit: Shanita  Signed Electronically by: CATE Calles

## (undated) DEVICE — Device

## (undated) DEVICE — DRAPE SHEET REV FOLD 3/4 9349

## (undated) DEVICE — LABEL MEDICATION SYSTEM 3303-P

## (undated) DEVICE — PREP POVIDONE-IODINE 10% SOLUTION 4OZ BOTTLE MDS093944

## (undated) DEVICE — SU CHROMIC 4-0 SH 27" G121H

## (undated) DEVICE — LINEN TOWEL PACK X6 WHITE 5487

## (undated) DEVICE — ESU NDL COLORADO MICRO E1651

## (undated) DEVICE — IMPLANTABLE DEVICE: Type: IMPLANTABLE DEVICE | Site: FACE | Status: NON-FUNCTIONAL

## (undated) DEVICE — SPONGE KITTNER 30-101

## (undated) DEVICE — ESU GROUND PAD ADULT W/CORD E7507

## (undated) DEVICE — CLIP HORIZON SM RED WIDE SLOT 001201

## (undated) DEVICE — LINEN TOWEL PACK X5 5464

## (undated) DEVICE — SU VICRYL 4-0 P-3 18" UND  J494H

## (undated) DEVICE — GLOVE BIOGEL PI MICRO SZ 6.5 48565

## (undated) DEVICE — BLADE KNIFE SURG 15 371115

## (undated) DEVICE — PACK NEURO MINOR UMMC SNE32MNMU4

## (undated) DEVICE — SU CHROMIC 4-0 J-1 18" 724G

## (undated) DEVICE — SUCTION MANIFOLD NEPTUNE 2 SYS 4 PORT 0702-020-000

## (undated) DEVICE — PREP SKIN SCRUB TRAY 4461A

## (undated) DEVICE — TOOTHBRUSH ADULT NON STERILE MDS136850

## (undated) DEVICE — SOL NACL 0.9% IRRIG 1000ML BOTTLE 2F7124

## (undated) DEVICE — EYE PREP BETADINE 5% SOLUTION 30ML 0065-0411-30

## (undated) DEVICE — CLIP HORIZON MED BLUE 002200

## (undated) DEVICE — DRSG TELFA 3X8" 1238

## (undated) RX ORDER — ACETAMINOPHEN 325 MG/1
TABLET ORAL
Status: DISPENSED
Start: 2024-02-16

## (undated) RX ORDER — DEXAMETHASONE SODIUM PHOSPHATE 4 MG/ML
INJECTION, SOLUTION INTRA-ARTICULAR; INTRALESIONAL; INTRAMUSCULAR; INTRAVENOUS; SOFT TISSUE
Status: DISPENSED
Start: 2024-02-16

## (undated) RX ORDER — FENTANYL CITRATE 50 UG/ML
INJECTION, SOLUTION INTRAMUSCULAR; INTRAVENOUS
Status: DISPENSED
Start: 2024-02-16

## (undated) RX ORDER — ONDANSETRON 2 MG/ML
INJECTION INTRAMUSCULAR; INTRAVENOUS
Status: DISPENSED
Start: 2024-02-16

## (undated) RX ORDER — GENTAMICIN 40 MG/ML
INJECTION, SOLUTION INTRAMUSCULAR; INTRAVENOUS
Status: DISPENSED
Start: 2024-02-16

## (undated) RX ORDER — HYDROMORPHONE HYDROCHLORIDE 1 MG/ML
INJECTION, SOLUTION INTRAMUSCULAR; INTRAVENOUS; SUBCUTANEOUS
Status: DISPENSED
Start: 2024-02-16

## (undated) RX ORDER — BUPIVACAINE HYDROCHLORIDE 5 MG/ML
INJECTION, SOLUTION EPIDURAL; INTRACAUDAL
Status: DISPENSED
Start: 2022-03-17

## (undated) RX ORDER — LIDOCAINE HYDROCHLORIDE AND EPINEPHRINE 10; 10 MG/ML; UG/ML
INJECTION, SOLUTION INFILTRATION; PERINEURAL
Status: DISPENSED
Start: 2024-02-16

## (undated) RX ORDER — CEFAZOLIN SODIUM/WATER 2 G/20 ML
SYRINGE (ML) INTRAVENOUS
Status: DISPENSED
Start: 2024-02-16

## (undated) RX ORDER — APREPITANT 40 MG/1
CAPSULE ORAL
Status: DISPENSED
Start: 2024-02-16

## (undated) RX ORDER — HYDROMORPHONE HCL IN WATER/PF 6 MG/30 ML
PATIENT CONTROLLED ANALGESIA SYRINGE INTRAVENOUS
Status: DISPENSED
Start: 2024-02-16

## (undated) RX ORDER — CHLORHEXIDINE GLUCONATE ORAL RINSE 1.2 MG/ML
SOLUTION DENTAL
Status: DISPENSED
Start: 2024-02-16

## (undated) RX ORDER — ACETAMINOPHEN 500 MG
TABLET ORAL
Status: DISPENSED
Start: 2022-03-17

## (undated) RX ORDER — ALPRAZOLAM 1 MG
TABLET ORAL
Status: DISPENSED
Start: 2022-03-17